# Patient Record
Sex: MALE | Race: WHITE | NOT HISPANIC OR LATINO | Employment: FULL TIME | URBAN - METROPOLITAN AREA
[De-identification: names, ages, dates, MRNs, and addresses within clinical notes are randomized per-mention and may not be internally consistent; named-entity substitution may affect disease eponyms.]

---

## 2017-01-03 ENCOUNTER — RADIANT APPOINTMENT (OUTPATIENT)
Dept: GENERAL RADIOLOGY | Facility: CLINIC | Age: 57
End: 2017-01-03
Attending: ORTHOPAEDIC SURGERY
Payer: COMMERCIAL

## 2017-01-03 ENCOUNTER — MYC MEDICAL ADVICE (OUTPATIENT)
Dept: FAMILY MEDICINE | Facility: CLINIC | Age: 57
End: 2017-01-03

## 2017-01-03 ENCOUNTER — OFFICE VISIT (OUTPATIENT)
Dept: ORTHOPEDICS | Facility: CLINIC | Age: 57
End: 2017-01-03
Payer: COMMERCIAL

## 2017-01-03 VITALS — BODY MASS INDEX: 35.44 KG/M2 | WEIGHT: 200 LBS | RESPIRATION RATE: 18 BRPM | HEIGHT: 63 IN

## 2017-01-03 DIAGNOSIS — M25.562 LEFT KNEE PAIN, UNSPECIFIED CHRONICITY: ICD-10-CM

## 2017-01-03 DIAGNOSIS — M25.562 LEFT KNEE PAIN, UNSPECIFIED CHRONICITY: Primary | ICD-10-CM

## 2017-01-03 DIAGNOSIS — M23.92 INTERNAL DERANGEMENT OF KNEE, LEFT: ICD-10-CM

## 2017-01-03 PROCEDURE — 99203 OFFICE O/P NEW LOW 30 MIN: CPT | Performed by: ORTHOPAEDIC SURGERY

## 2017-01-03 PROCEDURE — 73562 X-RAY EXAM OF KNEE 3: CPT | Mod: LT

## 2017-01-03 NOTE — PATIENT INSTRUCTIONS
Please call [facility] to schedule your MRI.  Refer to the number on the card given to you for the scheduling line.    -  Once your MRI is scheduled, make an appointment to discuss the results with Dr. Rishabh Logan.  You can call [phone] to make this appointment, anytime 2-3 days after your MRI scan is performed.  Dr. Rishabh Logan prefers his patients to come in for a follow-up to discuss next steps after MRI.

## 2017-01-03 NOTE — MR AVS SNAPSHOT
After Visit Summary   1/3/2017    Cesar Foss    MRN: [id number]           Patient Information     Date Of Birth          1960        Visit Information        Provider Department      1/3/2017 10:00 AM Rishabh Logan MD [facility]        Today's Diagnoses     Left knee pain, unspecified chronicity    -  1       Care Instructions     Please call [facility] to schedule your MRI.  Refer to the number on the card given to you for the scheduling line.    -  Once your MRI is scheduled, make an appointment to discuss the results with Dr. Rishabh Logan.  You can call [phone] to make this appointment, anytime 2-3 days after your MRI scan is performed.  Dr. Rishabh Logan prefers his patients to come in for a follow-up to discuss next steps after MRI.              Follow-ups after your visit        Who to contact     If you have questions or need follow up information about today's clinic visit or your schedule please contact [facility] directly at [phone].  Normal or non-critical lab and imaging results will be communicated to you by MyChart, letter or phone within 4 business days after the clinic has received the results. If you do not hear from us within 7 days, please contact the clinic through [facility] or phone. If you have a critical or abnormal lab result, we will notify you by phone as soon as possible.  Submit refill requests through AudioName or call your pharmacy and they will forward the refill request to us. Please allow 3 business days for your refill to be completed.          Additional Information About Your Visit        MyChart Information     AudioName gives you secure access to your electronic health record. If you see a primary care provider, you can also send messages to your care team and make appointments. If you have questions, please call your primary care clinic.  If you do not have a primary care  "provider, please call [phone] and they will assist you.        Care EveryWhere ID     This is your Care EveryWhere ID. This could be used by other organizations to access your [place] medical records  [id number]        Your Vitals Were     Respirations Height BMI (Body Mass Index)             18 1.6 m (5' 3\") 35.44 kg/m2          Blood Pressure from Last 3 Encounters:   10/18/16 136/86   10/03/16 135/89   03/08/16 126/81    Weight from Last 3 Encounters:   01/03/17 90.719 kg (200 lb)   10/18/16 92.534 kg (204 lb)   10/03/16 92.987 kg (205 lb)               Primary Care Provider Office Phone # Fax #    Martin Howe [phone]       [facility] [address] [facility] [address]        Thank you!     Thank you for choosing [facility]  for your care. Our goal is always to provide you with excellent care. Hearing back from our patients is one way we can continue to improve our services. Please take a few minutes to complete the written survey that you may receive in the mail after your visit with us. Thank you!             Your Updated Medication List - Protect others around you: Learn how to safely use, store and throw away your medicines at www.disposemymeds.org.          This list is accurate as of: 1/3/17 10:16 AM.  Always use your most recent med list.                   Brand Name Dispense Instructions for use    albuterol 108 (90 BASE) MCG/ACT Inhaler    albuterol    2 Inhaler    Inhale 2 puffs into the lungs every 6 hours as needed for shortness of breath / dyspnea or wheezing       amLODIPine 10 MG tablet    NORVASC    90 tablet    Take 1 tablet (10 mg) by mouth daily For blood pressure in PM Pharmacy ok to hold prescription until due       fluticasone 220 MCG/ACT Inhaler    FLOVENT HFA    1 Inhaler    Inhale 1 puff into the lungs 2 times daily for asthma prevention.       lisinopril 10 MG tablet    PRINIVIL/ZESTRIL    90 tablet    Take 1 tablet " (10 mg) by mouth daily In AM for blood pressure Pharmacy ok to hold prescription until due       loratadine 10 MG tablet    CLARITIN    90 tablet    Take 1 tablet (10 mg) by mouth daily As needed       omeprazole 20 MG tablet     30 tablet    Take 1 tablet (20 mg) by mouth daily Take 30-60 minutes before a meal.       simvastatin 10 MG tablet    ZOCOR    90 tablet    Take 1 tablet (10 mg) by mouth At Bedtime for cholesterol Pharmacy ok to hold prescription until due       SINUS RINSE KIT NA          sulindac 200 MG tablet    CLINORIL    60 tablet    Take 1 tablet (200 mg) by mouth 2 times daily (with meals) as needed for joint pain.

## 2017-01-03 NOTE — NURSING NOTE
"Chief Complaint   Patient presents with     RECHECK     New issue. Left knee twisted on 9/29/2016 stopped and turned on driveway. Pain level 5/10 sharp, shooting and constant. Rest and ice makes the pain better and kneeling and twisting makes the pain worse.       Initial Resp 18  Ht 1.6 m (5' 3\")  Wt 90.719 kg (200 lb)  BMI 35.44 kg/m2 Estimated body mass index is 35.44 kg/(m^2) as calculated from the following:    Height as of this encounter: 1.6 m (5' 3\").    Weight as of this encounter: 90.719 kg (200 lb).  BP completed using cuff size: NA (Not Taken)  Angy Crooks MA      "

## 2017-01-05 NOTE — PROGRESS NOTES
Cesar Foss is a 56 year old male who is seen in consultation at the request of Dr. Martin Howe  for left knee pain.    Past Medical History   Diagnosis Date     LBP (low back pain)      Migraines      Seizure disorder (H)      High cholesterol      Obesity      Unspecified asthma(493.90)      takes Flovent inhaler only PRN     Arthritis      Hypertension goal BP (blood pressure) < 140/90 7/30/2013     PONV (postoperative nausea and vomiting)      OREN (obstructive sleep apnea)      Bursitis      H/O/prepatellar     Diagnostic skin and sensitization tests (aka ALLERGENS) 1/27/15 skin tests pos. for:  cat/dog/DM/M/T/G/W      Seasonal allergic rhinitis      Seasonal allergic conjunctivitis      Allergic rhinitis due to animal dander      House dust mite allergy      Allergy to mold spores      1/27/15 skin tests pos. for:  cat/dog/DM/M/T/G/W        Past Surgical History   Procedure Laterality Date     C rad resec tonsil/pillars       Hc partial removal, clavicle  8/20/2004     Left shoulder [place]- Dr Doreen Nielson  2001     Colonoscopy  4/1/2013     Procedure: COLONOSCOPY;  COLONOSCOPY SCREEN;  Surgeon: Jameel Patel MD;  Location: [facility] OR     Esophagoscopy, gastroscopy, duodenoscopy (egd), combined N/A 8/21/2014     Procedure: COMBINED ESOPHAGOSCOPY, GASTROSCOPY, DUODENOSCOPY (EGD), BIOPSY SINGLE OR MULTIPLE;  Surgeon: Jameel Patel MD;  Location: [facility] OR     Laparoscopic cholecystectomy N/A 9/10/2014     Procedure: LAPAROSCOPIC CHOLECYSTECTOMY;  Surgeon: Jameel Patel MD;  Location: [facility] OR     Abdomen surgery       LAP BAND       Family History   Problem Relation Age of Onset     Eye Disorder Mother      HEART DISEASE Mother      60yo MI.     Lipids Mother      Macular Degeneration Mother      Lipids Father      CANCER Father      Alcohol/Drug Brother      Obesity Daughter      Glaucoma No family hx of      DIABETES Mother      Hypertension Mother      Hypertension Father       CEREBROVASCULAR DISEASE No family hx of      Thyroid Disease No family hx of      DIABETES Sister      hyperglycemic       Social History     Social History     Marital Status: Single     Spouse Name: N/A     Number of Children: N/A     Years of Education: N/A     Occupational History     Not on file.     Social History Main Topics     Smoking status: Never Smoker      Smokeless tobacco: Never Used      Comment: non smoking household     Alcohol Use: Yes      Comment: rare     Drug Use: No     Sexual Activity:     Partners: Female     Birth Control/ Protection: Condom     Other Topics Concern     Parent/Sibling W/ Cabg, Mi Or Angioplasty Before 65f 55m? Yes     MOM     Social History Narrative       Current Outpatient Prescriptions   Medication Sig Dispense Refill     lisinopril (PRINIVIL,ZESTRIL) 10 MG tablet Take 1 tablet (10 mg) by mouth daily In AM for blood pressure Pharmacy ok to hold prescription until due 90 tablet 1     simvastatin (ZOCOR) 10 MG tablet Take 1 tablet (10 mg) by mouth At Bedtime for cholesterol Pharmacy ok to hold prescription until due 90 tablet 3     amLODIPine (NORVASC) 10 MG tablet Take 1 tablet (10 mg) by mouth daily For blood pressure in PM Pharmacy ok to hold prescription until due 90 tablet 1     fluticasone (FLOVENT HFA) 220 MCG/ACT inhaler Inhale 1 puff into the lungs 2 times daily for asthma prevention. 1 Inhaler 5     sulindac (CLINORIL) 200 MG tablet Take 1 tablet (200 mg) by mouth 2 times daily (with meals) as needed for joint pain. 60 tablet 1     loratadine (CLARITIN) 10 MG tablet Take 1 tablet (10 mg) by mouth daily As needed 90 tablet 3     albuterol (ALBUTEROL) 108 (90 BASE) MCG/ACT inhaler Inhale 2 puffs into the lungs every 6 hours as needed for shortness of breath / dyspnea or wheezing 2 Inhaler 1     Hypertonic Nasal Wash (SINUS RINSE KIT NA)        omeprazole 20 MG tablet Take 1 tablet (20 mg) by mouth daily Take 30-60 minutes before a meal. 30 tablet 12       No Known  "Allergies    REVIEW OF SYSTEMS:  CONSTITUTIONAL:  NEGATIVE for fever, chills, change in weight, not feeling tired  SKIN:  NEGATIVE for worrisome rashes, no skin lumps, no skin ulcers and no non-healing wounds  EYES:  NEGATIVE for vision changes or irritation.  ENT/MOUTH:  NEGATIVE.  No hearing loss, no hoarseness, no difficulty swallowing.  RESP:  NEGATIVE. No cough or shortness of breath.  CV:  NEGATIVE for chest pain, palpitations or peripheral edema  GI:  NEGATIVE for nausea, abdominal pain, heartburn, or change in bowel habits  :  Negative. No dysuria, no hematuria  MUSCULOSKELETAL:  See HPI above  NEURO:  NEGATIVE . No headaches, no dizziness,  no numbness  ENDOCRINE:  NEGATIVE for temperature intolerance, skin/hair changes  HEME/ALLERGY/IMMUNE:  NEGATIVE for bleeding problems  PSYCHIATRIC:  NEGATIVE. no anxiety, no depression.      Exam:  Vitals: Resp 18  Ht 1.6 m (5' 3\")  Wt 90.719 kg (200 lb)  BMI 35.44 kg/m2  BMI= Body mass index is 35.44 kg/(m^2).  Constitutional:  healthy, alert and no distress  Neuro: Alert and Oriented x 3, Gait normal. Sensation grossly WNL.  HEENT:  Atraumatic, EOMI  Neck:  Neck supple with no tenderness.  Psych: Affect normal   Respiratory: Breathing not labored.  Cardiovascular: normal peripheral pulses  Lymph: no adenopathy  Skin: No rashes,worrisome lesions or skin problems  Spine: straight, no straight leg raising pain.  Hips show full range of motion.  There is no tenderness over the sacro-iliac joints, sciatic notch, or greater trochanters.     "

## 2017-01-06 ENCOUNTER — RADIANT APPOINTMENT (OUTPATIENT)
Dept: MRI IMAGING | Facility: CLINIC | Age: 57
End: 2017-01-06
Attending: ORTHOPAEDIC SURGERY
Payer: COMMERCIAL

## 2017-01-06 DIAGNOSIS — M23.92 INTERNAL DERANGEMENT OF KNEE, LEFT: ICD-10-CM

## 2017-01-06 DIAGNOSIS — M25.562 LEFT KNEE PAIN, UNSPECIFIED CHRONICITY: ICD-10-CM

## 2017-01-06 PROCEDURE — 73721 MRI JNT OF LWR EXTRE W/O DYE: CPT | Mod: LT | Performed by: RADIOLOGY

## 2017-01-06 NOTE — PROGRESS NOTES
HISTORY OF PRESENT ILLNESS:  Mr. Chikis Foss is a 56-year-old male referred from Dr. Martin Howe for evaluation of left knee pain.  He is a .  Pain started in 2016 when he stopped and turned in his driveway.  He has had some persistent pain since then, primarily over the inner aspect of the knee.  He has sharp, shooting, constant pain rated 5/10.  It is worse with kneeling or twisting.  He has had a knee sleeve which has helped a little bit.      IMAGING:  X-ray was performed today and images reviewed with the patient.  This shows no significant arthritis.  There is slight spurring of the patella.      PHYSICAL EXAMINATION:  Good range of motion of 0-125-130 degrees.  He has tenderness on the medial joint line in both mid and posterior.  He has no pain with varus or valgus stress.  There is no instability of MCL, LCL or cruciates.  He does have positive medial Kaylyn for diffuse pain medially.  He had some medial pain with lateral Kaylyn also.  There was a trace effusion in the left knee, negative on the right.  There is no increased warmth or erythema.  Sensation and circulation are intact.      IMPRESSION:  Left knee pain is suspicious for meniscus tear certainly seems to be internal derangement.  We discussed options and will proceed with MRI of the left knee to rule out medial meniscus tear and we will see him back following the MRI.         LIZA CARVER MD             D: 2017 10:01   T: 2017 10:23   MT: YULIET      Name:     CHIKIS FOSS   MRN:      [id number]        Account:      [id number]   :      1960           Visit Date:   2017      Document: [id number]

## 2017-01-17 ENCOUNTER — OFFICE VISIT (OUTPATIENT)
Dept: ORTHOPEDICS | Facility: CLINIC | Age: 57
End: 2017-01-17
Payer: COMMERCIAL

## 2017-01-17 VITALS — WEIGHT: 200 LBS | BODY MASS INDEX: 35.44 KG/M2 | HEIGHT: 63 IN | RESPIRATION RATE: 18 BRPM

## 2017-01-17 DIAGNOSIS — S83.242D OTHER TEAR OF MEDIAL MENISCUS OF LEFT KNEE AS CURRENT INJURY, SUBSEQUENT ENCOUNTER: Primary | ICD-10-CM

## 2017-01-17 PROCEDURE — 99212 OFFICE O/P EST SF 10 MIN: CPT | Performed by: ORTHOPAEDIC SURGERY

## 2017-01-17 NOTE — PROGRESS NOTES
Cesar ROSE Prudence returns for his left knee MRI results.  MRI images were independently visualized with the patient.  These showed a medial meniscus tear, the medial meniscus tear consists of posterior radial tear, moderate chondromalacia in the medial compartment and moderate chondromalacia in the patellofemoral compartment.  Symptoms have improved since he was last seen.    Impression:  Left Knee: medial meniscus tear  Chondromalacia.    Thus, our plan is activity modification avoiding squatting.  Possible arthroscopy in future.    Total time spent was 10 minutes; with 10 minutes spent face-to-face with patient discussing test results, treatment options, and estimated recovery time.

## 2017-01-17 NOTE — MR AVS SNAPSHOT
After Visit Summary   1/17/2017    Cesar Foss    MRN: [id number]           Patient Information     Date Of Birth          1960        Visit Information        Provider Department      1/17/2017 8:30 AM Rishabh Logan MD [facility]        Care Instructions    Please remember to call and schedule a follow up appointment if one was recommended at your earliest convenience.  Orthopedics CLINIC HOURS TELEPHONE NUMBER   Dr. Doreen Mcgill  Surgical Assistant      Angy  Medical Assistant   Mondays- 8:00 - 4:00  [facility]    Tuesdays- 8:00-4:00  [facility] in the morning and [facility] in the afternoon   Thursdays- 8:00-4:00  [facility] in the morning and [facility] in the afternoon  Specialty schedulers:   [phone] to make an appointment with any Specialty Provider.   Main Clinic:   [phone] to make an appointment with your primary provider   Urgent Care locations:    [facility]   Monday-Friday Closed  Saturday-Sunday 9 am-5pm    Monday-Friday 12 pm - 8 pm  Saturday-Sunday 9 am-5 pm   [phone]     If SURGERY has been recommended, please call our Specialty Schedulers at [phone] to schedule your procedure.    If you need a medication refill, please contact your pharmacy. Please allow 3 business days for your refill to be completed.  Use Aicent (secure e-mail communication and access to your chart) to send a message or to make an appointment. Please ask how you can sign up for [employer].            Follow-ups after your visit        Who to contact     If you have questions or need follow up information about today's clinic visit or your schedule please contact [facility] directly at [phone].  Normal or non-critical lab and imaging results will be communicated to you by MyChart, letter or phone within 4 business  "days after the clinic has received the results. If you do not hear from us within 7 days, please contact the clinic through [facility] or phone. If you have a critical or abnormal lab result, we will notify you by phone as soon as possible.  Submit refill requests through [facility] or call your pharmacy and they will forward the refill request to us. Please allow 3 business days for your refill to be completed.          Additional Information About Your Visit        [facility] Information     [facility] gives you secure access to your electronic health record. If you see a primary care provider, you can also send messages to your care team and make appointments. If you have questions, please call your primary care clinic.  If you do not have a primary care provider, please call [phone] and they will assist you.        Care EveryWhere ID     This is your Care EveryWhere ID. This could be used by other organizations to access your [place] medical records  [id number]        Your Vitals Were     Respirations Height BMI (Body Mass Index)             18 1.6 m (5' 3\") 35.44 kg/m2          Blood Pressure from Last 3 Encounters:   10/18/16 136/86   10/03/16 135/89   03/08/16 126/81    Weight from Last 3 Encounters:   01/17/17 90.719 kg (200 lb)   01/03/17 90.719 kg (200 lb)   10/18/16 92.534 kg (204 lb)              Today, you had the following     No orders found for display       Primary Care Provider Office Phone # Fax #    Martin Howe [phone]       [facility] [address] [facility] [address]        Thank you!     Thank you for choosing [facility]  for your care. Our goal is always to provide you with excellent care. Hearing back from our patients is one way we can continue to improve our services. Please take a few minutes to complete the written survey that you may receive in the mail after your visit with us. Thank you!             Your Updated Medication " List - Protect others around you: Learn how to safely use, store and throw away your medicines at www.disposemymeds.org.          This list is accurate as of: 1/17/17  8:47 AM.  Always use your most recent med list.                   Brand Name Dispense Instructions for use    albuterol 108 (90 BASE) MCG/ACT Inhaler    albuterol    2 Inhaler    Inhale 2 puffs into the lungs every 6 hours as needed for shortness of breath / dyspnea or wheezing       amLODIPine 10 MG tablet    NORVASC    90 tablet    Take 1 tablet (10 mg) by mouth daily For blood pressure in PM Pharmacy ok to hold prescription until due       fluticasone 220 MCG/ACT Inhaler    FLOVENT HFA    1 Inhaler    Inhale 1 puff into the lungs 2 times daily for asthma prevention.       lisinopril 10 MG tablet    PRINIVIL/ZESTRIL    90 tablet    Take 1 tablet (10 mg) by mouth daily In AM for blood pressure Pharmacy ok to hold prescription until due       loratadine 10 MG tablet    CLARITIN    90 tablet    Take 1 tablet (10 mg) by mouth daily As needed       omeprazole 20 MG tablet     30 tablet    Take 1 tablet (20 mg) by mouth daily Take 30-60 minutes before a meal.       simvastatin 10 MG tablet    ZOCOR    90 tablet    Take 1 tablet (10 mg) by mouth At Bedtime for cholesterol Pharmacy ok to hold prescription until due       SINUS RINSE KIT NA          sulindac 200 MG tablet    CLINORIL    60 tablet    Take 1 tablet (200 mg) by mouth 2 times daily (with meals) as needed for joint pain.

## 2017-01-17 NOTE — NURSING NOTE
"Chief Complaint   Patient presents with     RECHECK     Left knee/MRI results.       Initial Resp 18  Ht 1.6 m (5' 3\")  Wt 90.719 kg (200 lb)  BMI 35.44 kg/m2 Estimated body mass index is 35.44 kg/(m^2) as calculated from the following:    Height as of this encounter: 1.6 m (5' 3\").    Weight as of this encounter: 90.719 kg (200 lb).  BP completed using cuff size: NA (Not Taken)  Angy Crooks MA      "

## 2017-01-17 NOTE — PATIENT INSTRUCTIONS
Please remember to call and schedule a follow up appointment if one was recommended at your earliest convenience.  Orthopedics CLINIC HOURS TELEPHONE NUMBER   Dr. Doreen Mcgill  Surgical Assistant      Angy  Medical Assistant   Mondays- 8:00 - 4:00  [facility]    Tuesdays- 8:00-4:00  [facility] in the morning and [facility] in the afternoon   Thursdays- 8:00-4:00  [facility] in the morning and [facility] in the afternoon  Specialty schedulers:   [phone] to make an appointment with any Specialty Provider.   Main Clinic:   [phone] to make an appointment with your primary provider   Urgent Care locations:    [facility]   Monday-Friday Closed  Saturday-Sunday 9 am-5pm    Monday-Friday 12 pm - 8 pm  Saturday-Sunday 9 am-5 pm   [phone]     If SURGERY has been recommended, please call our Specialty Schedulers at [phone] to schedule your procedure.    If you need a medication refill, please contact your pharmacy. Please allow 3 business days for your refill to be completed.  Use [employer]ot (secure e-mail communication and access to your chart) to send a message or to make an appointment. Please ask how you can sign up for [facility].

## 2017-05-22 DIAGNOSIS — Z12.5 SPECIAL SCREENING FOR MALIGNANT NEOPLASM OF PROSTATE: ICD-10-CM

## 2017-05-22 DIAGNOSIS — I10 ESSENTIAL HYPERTENSION WITH GOAL BLOOD PRESSURE LESS THAN 140/90: ICD-10-CM

## 2017-05-22 DIAGNOSIS — K76.0 FATTY LIVER: ICD-10-CM

## 2017-05-22 LAB
ALBUMIN SERPL-MCNC: 4.5 G/DL (ref 3.4–5)
ALP SERPL-CCNC: 50 U/L (ref 40–150)
ALT SERPL W P-5'-P-CCNC: 110 U/L (ref 0–70)
ANION GAP SERPL CALCULATED.3IONS-SCNC: 10 MMOL/L (ref 3–14)
AST SERPL W P-5'-P-CCNC: 59 U/L (ref 0–45)
BILIRUB SERPL-MCNC: 0.6 MG/DL (ref 0.2–1.3)
BUN SERPL-MCNC: 13 MG/DL (ref 7–30)
CALCIUM SERPL-MCNC: 9.5 MG/DL (ref 8.5–10.1)
CHLORIDE SERPL-SCNC: 104 MMOL/L (ref 94–109)
CO2 SERPL-SCNC: 26 MMOL/L (ref 20–32)
CREAT SERPL-MCNC: 0.77 MG/DL (ref 0.66–1.25)
GFR SERPL CREATININE-BSD FRML MDRD: ABNORMAL ML/MIN/1.7M2
GLUCOSE SERPL-MCNC: 88 MG/DL (ref 70–99)
POTASSIUM SERPL-SCNC: 3.9 MMOL/L (ref 3.4–5.3)
PROT SERPL-MCNC: 8 G/DL (ref 6.8–8.8)
PSA SERPL-ACNC: 0.97 UG/L (ref 0–4)
SODIUM SERPL-SCNC: 140 MMOL/L (ref 133–144)

## 2017-05-22 PROCEDURE — G0103 PSA SCREENING: HCPCS | Performed by: FAMILY MEDICINE

## 2017-05-22 PROCEDURE — 36415 COLL VENOUS BLD VENIPUNCTURE: CPT | Performed by: FAMILY MEDICINE

## 2017-05-22 PROCEDURE — 80053 COMPREHEN METABOLIC PANEL: CPT | Performed by: FAMILY MEDICINE

## 2017-06-06 ENCOUNTER — OFFICE VISIT (OUTPATIENT)
Dept: FAMILY MEDICINE | Facility: CLINIC | Age: 57
End: 2017-06-06
Payer: COMMERCIAL

## 2017-06-06 VITALS
DIASTOLIC BLOOD PRESSURE: 78 MMHG | HEIGHT: 63 IN | WEIGHT: 201 LBS | HEART RATE: 98 BPM | BODY MASS INDEX: 35.61 KG/M2 | TEMPERATURE: 97.8 F | OXYGEN SATURATION: 98 % | SYSTOLIC BLOOD PRESSURE: 110 MMHG

## 2017-06-06 DIAGNOSIS — I10 ESSENTIAL HYPERTENSION WITH GOAL BLOOD PRESSURE LESS THAN 140/90: ICD-10-CM

## 2017-06-06 DIAGNOSIS — K76.0 FATTY LIVER: ICD-10-CM

## 2017-06-06 DIAGNOSIS — E78.5 HYPERLIPIDEMIA LDL GOAL <130: ICD-10-CM

## 2017-06-06 DIAGNOSIS — Z71.89 ADVANCED DIRECTIVES, COUNSELING/DISCUSSION: ICD-10-CM

## 2017-06-06 DIAGNOSIS — J45.30 MILD PERSISTENT ASTHMA WITHOUT COMPLICATION: ICD-10-CM

## 2017-06-06 PROCEDURE — 99214 OFFICE O/P EST MOD 30 MIN: CPT | Performed by: FAMILY MEDICINE

## 2017-06-06 RX ORDER — FLUTICASONE PROPIONATE 220 UG/1
1 AEROSOL, METERED RESPIRATORY (INHALATION) 2 TIMES DAILY
Qty: 1 INHALER | Refills: 5 | Status: SHIPPED | OUTPATIENT
Start: 2017-06-06 | End: 2017-12-01

## 2017-06-06 NOTE — NURSING NOTE
"Chief Complaint   Patient presents with     Hypertension     Lipids       Initial /78  Pulse 98  Temp 97.8  F (36.6  C) (Oral)  Ht 5' 3\" (1.6 m)  Wt 201 lb (91.2 kg)  SpO2 98%  BMI 35.61 kg/m2 Estimated body mass index is 35.61 kg/(m^2) as calculated from the following:    Height as of this encounter: 5' 3\" (1.6 m).    Weight as of this encounter: 201 lb (91.2 kg).  Medication Reconciliation: complete   Venus Topete CMA    "

## 2017-06-06 NOTE — PROGRESS NOTES
"SUBJECTIVE:  Cesar Foss, a 57 year old male scheduled an appointment to discuss the following issues:  Advanced directives, counseling/discussion  Follow-up htn, gerd, high cholesterol, fatty liver and asthma.  Was in [place] last week.   gerd stable. No chest pain or shortness of breath. Exercise - working up more. Asthma ok. Albuterol - not needed in past months. No nausea, vomiting or diarrhea or black/bloody stool. No urine changes or hematuria. No abdominal pain. Emotionally ok.   Outside blood pressure reading - ok.    Medical, social, surgical, and family histories reviewed.    ROS:  \  /78  Pulse 98  Temp 97.8  F (36.6  C) (Oral)  Ht 5' 3\" (1.6 m)  Wt 201 lb (91.2 kg)  SpO2 98%  BMI 35.61 kg/m2  EXAM:  GENERAL APPEARANCE: healthy, alert and no distress  EYES: EOMI,  PERRL  NECK: no adenopathy, no asymmetry, masses, or scars and thyroid normal to palpation  RESP: lungs clear to auscultation - no rales, rhonchi or wheezes  CV: regular rates and rhythm, normal S1 S2, no S3 or S4 and no murmur, click or rub -  ABDOMEN:  soft, nontender, no HSM or masses and bowel sounds normal  MS: extremities normal- no gross deformities noted, no evidence of inflammation in joints, FROM in all extremities.  NEURO: Normal strength and tone, sensory exam grossly normal, mentation intact and speech normal  PSYCH: mentation appears normal and affect normal/bright    ASSESSMENT / PLAN:  (E66.01) Obesity, Class II, BMI 35-39.9, with comorbidity (H)  (primary encounter diagnosis)  Comment: needs help  Plan: more exercise/diet. Recheck in 6 months      (J45.30) Mild persistent asthma without complication  Comment: stable  Plan: fluticasone (FLOVENT HFA) 220 MCG/ACT Inhaler        Rare albuteroll prn    (K76.0) Fatty liver  Comment: worse but back form [place] and no symptoms   Plan: Expected course and warning signs reviewed. Repeat u/s if symptoms or worse. Exercise/ weight loss. Avoid ALCOHOL.     (I10) Essential " hypertension with goal blood pressure less than 140/90  Comment: stable  Plan: continue meds and self-monitor. Chest pain or shortness of breath to er.     (E78.5) Hyperlipidemia LDL goal <130  Comment: stable  Plan: Recheck in 6 months    Martin Howe MD

## 2017-06-06 NOTE — MR AVS SNAPSHOT
After Visit Summary   6/6/2017    Cesar Foss    MRN: [id number]           Patient Information     Date Of Birth          1960        Visit Information        Provider Department      6/6/2017 5:10 PM Martin Howe MD [facility]        Today's Diagnoses     Obesity, Class II, BMI 35-39.9, with comorbidity (H)    -  1    Mild persistent asthma without complication        Fatty liver        Essential hypertension with goal blood pressure less than 140/90        Hyperlipidemia LDL goal <130        Advanced directives, counseling/discussion           Follow-ups after your visit        Who to contact     If you have questions or need follow up information about today's clinic visit or your schedule please contact [facility] directly at [phone].  Normal or non-critical lab and imaging results will be communicated to you by Wellhart, letter or phone within 4 business days after the clinic has received the results. If you do not hear from us within 7 days, please contact the clinic through Wellhart or phone. If you have a critical or abnormal lab result, we will notify you by phone as soon as possible.  Submit refill requests through Bromium or call your pharmacy and they will forward the refill request to us. Please allow 3 business days for your refill to be completed.          Additional Information About Your Visit        MyChart Information     Bromium gives you secure access to your electronic health record. If you see a primary care provider, you can also send messages to your care team and make appointments. If you have questions, please call your primary care clinic.  If you do not have a primary care provider, please call [phone] and they will assist you.        Care EveryWhere ID     This is your Care EveryWhere ID. This could be used by other organizations to access your [place] medical records  [id number]        Your Vitals Were     Pulse  "Temperature Height Pulse Oximetry BMI (Body Mass Index)       98 97.8  F (36.6  C) (Oral) 5' 3\" (1.6 m) 98% 35.61 kg/m2        Blood Pressure from Last 3 Encounters:   06/06/17 110/78   10/18/16 136/86   10/03/16 135/89    Weight from Last 3 Encounters:   06/06/17 201 lb (91.2 kg)   01/17/17 200 lb (90.7 kg)   01/03/17 200 lb (90.7 kg)              Today, you had the following     No orders found for display         Where to get your medicines      These medications were sent to [facility]/pharmacy #[id number] - [place], [place] - [address],  [address] [facility]  [address], [facility] [address]     Phone:  [phone]     fluticasone 220 MCG/ACT Inhaler          Primary Care Provider Office Phone # Fax #    Martin Howe [phone]       [facility] [address] [address] [facility] [address]        Thank you!     Thank you for choosing [facility]  for your care. Our goal is always to provide you with excellent care. Hearing back from our patients is one way we can continue to improve our services. Please take a few minutes to complete the written survey that you may receive in the mail after your visit with us. Thank you!             Your Updated Medication List - Protect others around you: Learn how to safely use, store and throw away your medicines at www.disposemymeds.org.          This list is accurate as of: 6/6/17  5:46 PM.  Always use your most recent med list.                   Brand Name Dispense Instructions for use    albuterol 108 (90 BASE) MCG/ACT Inhaler    albuterol    2 Inhaler    Inhale 2 puffs into the lungs every 6 hours as needed for shortness of breath / dyspnea or wheezing       amLODIPine 10 MG tablet    NORVASC    90 tablet    TAKE 1 TABLET BY MOUTH FOR BLOOD PRESSURE IN THE PM       fluticasone 220 MCG/ACT Inhaler    FLOVENT HFA    1 Inhaler    Inhale 1 puff into the lungs 2 times daily for asthma prevention.       " lisinopril 10 MG tablet    PRINIVIL/ZESTRIL    90 tablet    TAKE 1 TABLET BY MOUTH IN THE MORNING       simvastatin 10 MG tablet    ZOCOR    90 tablet    Take 1 tablet (10 mg) by mouth At Bedtime for cholesterol Pharmacy ok to hold prescription until due       SINUS RINSE KIT NA

## 2017-06-07 ASSESSMENT — ASTHMA QUESTIONNAIRES: ACT_TOTALSCORE: 24

## 2017-06-12 ENCOUNTER — TELEPHONE (OUTPATIENT)
Dept: FAMILY MEDICINE | Facility: CLINIC | Age: 57
End: 2017-06-12

## 2017-06-12 DIAGNOSIS — J45.30 MILD PERSISTENT ASTHMA WITHOUT COMPLICATION: Primary | ICD-10-CM

## 2017-06-12 NOTE — TELEPHONE ENCOUNTER
Please change product to Pulmicort Flexhaler     Will cover this but not Flovent HFA    Questions call help desk  [phone]    Card mendez ID [id number]    Third party name [facility]

## 2017-06-13 ENCOUNTER — OFFICE VISIT (OUTPATIENT)
Dept: ORTHOPEDICS | Facility: CLINIC | Age: 57
End: 2017-06-13
Payer: COMMERCIAL

## 2017-06-13 VITALS — HEIGHT: 63 IN | BODY MASS INDEX: 36.14 KG/M2 | RESPIRATION RATE: 18 BRPM | TEMPERATURE: 98.1 F | WEIGHT: 204 LBS

## 2017-06-13 DIAGNOSIS — M94.262 CHONDROMALACIA, KNEE, LEFT: ICD-10-CM

## 2017-06-13 DIAGNOSIS — S83.242D OTHER TEAR OF MEDIAL MENISCUS OF LEFT KNEE AS CURRENT INJURY, SUBSEQUENT ENCOUNTER: Primary | ICD-10-CM

## 2017-06-13 PROBLEM — J45.30 MILD PERSISTENT ASTHMA WITHOUT COMPLICATION: Status: ACTIVE | Noted: 2017-06-13

## 2017-06-13 PROCEDURE — 99213 OFFICE O/P EST LOW 20 MIN: CPT | Performed by: ORTHOPAEDIC SURGERY

## 2017-06-13 NOTE — LETTER
6/13/2017       RE: Cesar Foss  [address] [address] [address]           Dear Colleague,    Thank you for referring your patient, Cesar Foss, to the [facility]. Please see a copy of my visit note below.    Cesar Foss returns for his left knee pain.  MRI previously showed a medial meniscus tear, the medial meniscus tear consists of posterior radial tear, moderate chondromalacia in the medial compartment and moderate chondromalacia in the patellofemoral compartment.  Symptoms have increased.  Strengthening and ibuprofen did not help.    Impression:  Left Knee: medial meniscus tear  Chondromalacia.    Thus, our plan is Knee Arthroscopy OA: Discussed findings with patient.  We talked about treatment options.  The pain may be due to either arthritis or the meniscal tear; or a combination of both.  I feel the best treatment option due to the mechanical symptoms is to do arthroscopy.  Injections can be done later after the knee has been debrided.  I have explained the nature of the procedure, the risks and recovery time with the patient.            Again, thank you for allowing me to participate in the care of your patient.        Sincerely,              Rishabh Logan MD

## 2017-06-13 NOTE — PATIENT INSTRUCTIONS
Preop physical with primary physician is needed within 30 days of the surgery.  Nothing to eat or drink for 8 hours before surgery.  For same day surgery you need a ride.  Someone should stay with you for 12 hours afterward.  Stop blood thinners 5 days before surgery (aspirin, warfarin, anti-inflammatories).  Stop Plavix at least 10-14 days before surgery.    After arthroscopy I will give you these instructions:  You may walk on the leg as soon as you wish.  Bend the knee as soon as you wish.   Most people use crutches for a couple days.  Leave initial dressing for 2 days.  Then you may remove dressing and shower.  Rewrap with ace wrap.  Leave steri-strips in place.  Weight bearing as tolerated.  Apply ice or cold packs intermittently as needed to relieve pain.   Take pain medications as much as needed, but as little as you can get away with.  They do cause constipation.  May use Aleve up to 4 tablets per day or ibuprofen up to 12 tablets per day if desired..  Most people take about a week off work.  I recommend taking one aspirin 325 mg / day for 3 weeks to minimize chance of a blood clot.  Return to clinic 1 week after surgery.    Call [phone] to schedule your surgery.

## 2017-06-13 NOTE — PROGRESS NOTES
Cesar A Fjerstad returns for his left knee pain.  MRI previously showed a medial meniscus tear, the medial meniscus tear consists of posterior radial tear, moderate chondromalacia in the medial compartment and moderate chondromalacia in the patellofemoral compartment.  Symptoms have increased.  Strengthening and ibuprofen did not help.    Impression:  Left Knee: medial meniscus tear  Chondromalacia.    Thus, our plan is Knee Arthroscopy OA: Discussed findings with patient.  We talked about treatment options.  The pain may be due to either arthritis or the meniscal tear; or a combination of both.  I feel the best treatment option due to the mechanical symptoms is to do arthroscopy.  Injections can be done later after the knee has been debrided.  I have explained the nature of the procedure, the risks and recovery time with the patient.

## 2017-06-13 NOTE — MR AVS SNAPSHOT
After Visit Summary   6/13/2017    Cesar Foss    MRN: [id number]           Patient Information     Date Of Birth          1960        Visit Information        Provider Department      6/13/2017 9:00 AM Rishabh Logan MD [facility]        Care Instructions    Preop physical with primary physician is needed within 30 days of the surgery.  Nothing to eat or drink for 8 hours before surgery.  For same day surgery you need a ride.  Someone should stay with you for 12 hours afterward.  Stop blood thinners 5 days before surgery (aspirin, warfarin, anti-inflammatories).  Stop Plavix at least 10-14 days before surgery.    After arthroscopy I will give you these instructions:  You may walk on the leg as soon as you wish.  Bend the knee as soon as you wish.   Most people use crutches for a couple days.  Leave initial dressing for 2 days.  Then you may remove dressing and shower.  Rewrap with ace wrap.  Leave steri-strips in place.  Weight bearing as tolerated.  Apply ice or cold packs intermittently as needed to relieve pain.   Take pain medications as much as needed, but as little as you can get away with.  They do cause constipation.  May use Aleve up to 4 tablets per day or ibuprofen up to 12 tablets per day if desired..  Most people take about a week off work.  I recommend taking one aspirin 325 mg / day for 3 weeks to minimize chance of a blood clot.  Return to clinic 1 week after surgery.    Call [phone] to schedule your surgery.            Follow-ups after your visit        Your next 10 appointments already scheduled     Jun 13, 2017  9:00 AM CDT   Return Visit with Rishabh Logan MD   [facility] ([facility]    [address] [place] [id number] [phone]              Who to contact     If you have questions or need follow up information about today's clinic visit or your schedule please contact  "[facility] directly at [phone].  Normal or non-critical lab and imaging results will be communicated to you by MyChart, letter or phone within 4 business days after the clinic has received the results. If you do not hear from us within 7 days, please contact the clinic through Shiram Credithart or phone. If you have a critical or abnormal lab result, we will notify you by phone as soon as possible.  Submit refill requests through Actionality or call your pharmacy and they will forward the refill request to us. Please allow 3 business days for your refill to be completed.          Additional Information About Your Visit        Shiram CreditharGoodmail Systems Information     Actionality gives you secure access to your electronic health record. If you see a primary care provider, you can also send messages to your care team and make appointments. If you have questions, please call your primary care clinic.  If you do not have a primary care provider, please call [phone] and they will assist you.        Care EveryWhere ID     This is your Care EveryWhere ID. This could be used by other organizations to access your [place] medical records  [id number]        Your Vitals Were     Temperature Respirations Height BMI (Body Mass Index)          98.1  F (36.7  C) 18 1.6 m (5' 3\") 36.14 kg/m2         Blood Pressure from Last 3 Encounters:   06/06/17 110/78   10/18/16 136/86   10/03/16 135/89    Weight from Last 3 Encounters:   06/13/17 92.5 kg (204 lb)   06/06/17 91.2 kg (201 lb)   01/17/17 90.7 kg (200 lb)              Today, you had the following     No orders found for display       Primary Care Provider Office Phone # Fax #    Martin Howe [phone]       [facility] [address] [facility] [address]        Thank you!     Thank you for choosing [facility]  for your care. Our goal is always to provide you with excellent care. Hearing back from our patients is one way we can " continue to improve our services. Please take a few minutes to complete the written survey that you may receive in the mail after your visit with us. Thank you!             Your Updated Medication List - Protect others around you: Learn how to safely use, store and throw away your medicines at www.disposemymeds.org.          This list is accurate as of: 6/13/17  8:57 AM.  Always use your most recent med list.                   Brand Name Dispense Instructions for use    albuterol 108 (90 BASE) MCG/ACT Inhaler    albuterol    2 Inhaler    Inhale 2 puffs into the lungs every 6 hours as needed for shortness of breath / dyspnea or wheezing       amLODIPine 10 MG tablet    NORVASC    90 tablet    TAKE 1 TABLET BY MOUTH FOR BLOOD PRESSURE IN THE PM       budesonide 180 MCG/ACT inhaler    PULMICORT FLEXHALER    3 Inhaler    Inhale 1 puff into the lungs 2 times daily Replaces flovent for insurance       fluticasone 220 MCG/ACT Inhaler    FLOVENT HFA    1 Inhaler    Inhale 1 puff into the lungs 2 times daily for asthma prevention.       lisinopril 10 MG tablet    PRINIVIL/ZESTRIL    90 tablet    TAKE 1 TABLET BY MOUTH IN THE MORNING       simvastatin 10 MG tablet    ZOCOR    90 tablet    Take 1 tablet (10 mg) by mouth At Bedtime for cholesterol Pharmacy ok to hold prescription until due       SINUS RINSE KIT NA

## 2017-06-13 NOTE — NURSING NOTE
"Chief Complaint   Patient presents with     RECHECK     Left knee MMT.       Initial Temp 98.1  F (36.7  C)  Resp 18  Ht 1.6 m (5' 3\")  Wt 92.5 kg (204 lb)  BMI 36.14 kg/m2 Estimated body mass index is 36.14 kg/(m^2) as calculated from the following:    Height as of this encounter: 1.6 m (5' 3\").    Weight as of this encounter: 92.5 kg (204 lb).  Medication Reconciliation: complete   Angy Crooks MA      "

## 2017-07-10 NOTE — PROGRESS NOTES
[facility]  [address] [address] [address] [phone]  Dept: [phone]    PRE-OP EVALUATION:  Today's date: 2017    Cesar Foss (: 1960) presents for pre-operative evaluation assessment as requested by Dr. Logan.  He requires evaluation and anesthesia risk assessment prior to undergoing surgery/procedure for treatment of left knee .  Proposed procedure: repair    Date of Surgery/ Procedure: 2017  Time of Surgery/ Procedure: 7:30 am  Hospital/Surgical Facility: [place]    Primary Physician: Martin Howe  Type of Anesthesia Anticipated: to be determined    Patient has a Health Care Directive or Living Will:  NO    Preop Questions 2017   1.  Do you have a history of heart attack, stroke, stent, bypass or surgery on an artery in the head, neck, heart or legs? No   2.  Do you ever have any pain or discomfort in your chest? No   3.  Do you have a history of  Heart Failure? No   4.   Are you troubled by shortness of breath when:  walking on a level surface, or up a slight hill, or at night? YES - asthma - stable recently.   5.  Do you currently have a cold, bronchitis or other respiratory infection? No   6.  Do you have a cough, shortness of breath, or wheezing? No   7.  Do you sometimes get pains in the calves of your legs when you walk? No   8. Do you or anyone in your family have previous history of blood clots? No   9.  Do you or does anyone in your family have a serious bleeding problem such as prolonged bleeding following surgeries or cuts? No   10. Have you ever had problems with anemia or been told to take iron pills? No   11. Have you had any abnormal blood loss such as black, tarry or bloody stools? No   12. Have you ever had a blood transfusion? No   13. Have you or any of your relatives ever had problems with anesthesia? YES - nausea   14. Do you have sleep apnea, excessive snoring or daytime drowsiness? YES - c pap   15. Do you have any  prosthetic heart valves? No   16. Do you have prosthetic joints? No         HPI:                                                      Brief HPI related to upcoming procedure: meniscal tear left knee      HYPERTENSION - Patient has longstanding history of mod-severe HTN , currently denies any symptoms referable to elevated blood pressure. Specifically denies chest pain, palpitations, dyspnea, orthopnea, PND or peripheral edema. Blood pressure readings have been in normal range. Current medication regimen is as listed below. Patient denies any side effects of medication.                                                                                                                                                                                          .  HYPERLIPIDEMIA - Patient has a long history of significant Hyperlipidemia requiring medication for treatment with recent good control. Patient reports no problems or side effects with the medication.                                                                                                                                                       .  ASTHMA - stalbe                   .  SLEEP PROBLEM-  cpap                                                                                                                                         .    MEDICAL HISTORY:                                                      Patient Active Problem List    Diagnosis Date Noted     Mild persistent asthma without complication 06/13/2017     Priority: Medium     Chondromalacia, knee, left 06/13/2017     Priority: Medium     Advanced directives, counseling/discussion 06/06/2017     Priority: Medium     i gave handouts       Hyperlipidemia LDL goal <130 06/06/2017     Priority: Medium     Other tear of medial meniscus of left knee as current injury, subsequent encounter 01/17/2017     Priority: Medium     Obesity, Class II, BMI 35-39.9, with comorbidity 10/03/2016     Priority:  Medium     Gastroesophageal reflux disease without esophagitis 03/08/2016     Priority: Medium     Fatty liver 03/08/2016     Priority: Medium     Allergy to mold spores      Priority: Medium     1/27/15 skin tests pos. for:  cat/dog/DM/M/T/G/W        House dust mite allergy      Priority: Medium     Allergic rhinitis due to animal dander      Priority: Medium     Seasonal allergic conjunctivitis      Priority: Medium     Seasonal allergic rhinitis      Priority: Medium     Diagnostic skin and sensitization tests (aka ALLERGENS)      Priority: Medium     OREN (obstructive sleep apnea) 04/09/2014     Priority: Medium     Shoulder impingement - left 08/09/2013     Priority: Medium     Essential hypertension with goal blood pressure less than 140/90 07/30/2013     Priority: Medium     Mild persistent asthma 02/26/2013     Priority: Medium     CARDIOVASCULAR SCREENING; LDL GOAL LESS THAN 160 10/31/2010     Priority: Medium     Migraine, ophthalmoplegic 08/23/2010     Priority: Medium     BURSITIS, PREPATELLAR - right 08/12/2010     Priority: Medium      Past Medical History:   Diagnosis Date     Allergic rhinitis due to animal dander      Allergy to mold spores     1/27/15 skin tests pos. for:  cat/dog/DM/M/T/G/W      Arthritis      Bursitis     H/O/prepatellar     Diagnostic skin and sensitization tests (aka ALLERGENS) 1/27/15 skin tests pos. for:  cat/dog/DM/M/T/G/W      High cholesterol      House dust mite allergy      Hypertension goal BP (blood pressure) < 140/90 7/30/2013     LBP (low back pain)      Migraines      Obesity      OREN (obstructive sleep apnea)      PONV (postoperative nausea and vomiting)      Seasonal allergic conjunctivitis      Seasonal allergic rhinitis      Seizure disorder (H)      Unspecified asthma(493.90)     takes Flovent inhaler only PRN     Past Surgical History:   Procedure Laterality Date     ABDOMEN SURGERY      LAP BAND     C RAD RESEC TONSIL/PILLARS       COLONOSCOPY  4/1/2013     Procedure: COLONOSCOPY;  COLONOSCOPY SCREEN;  Surgeon: Jameel Patel MD;  Location: [facility] OR     ESOPHAGOSCOPY, GASTROSCOPY, DUODENOSCOPY (EGD), COMBINED N/A 8/21/2014    Procedure: COMBINED ESOPHAGOSCOPY, GASTROSCOPY, DUODENOSCOPY (EGD), BIOPSY SINGLE OR MULTIPLE;  Surgeon: Jameel Patel MD;  Location: [facility] OR     HC PARTIAL REMOVAL, CLAVICLE  8/20/2004    Left shoulder Jose [place] Dr Logan     LAPAROSCOPIC CHOLECYSTECTOMY N/A 9/10/2014    Procedure: LAPAROSCOPIC CHOLECYSTECTOMY;  Surgeon: Jameel Patel MD;  Location: [facility] OR     LASIK  2001     Current Outpatient Prescriptions   Medication Sig Dispense Refill     budesonide (PULMICORT FLEXHALER) 180 MCG/ACT inhaler Inhale 1 puff into the lungs 2 times daily Replaces flovent for insurance 3 Inhaler 3     fluticasone (FLOVENT HFA) 220 MCG/ACT Inhaler Inhale 1 puff into the lungs 2 times daily for asthma prevention. 1 Inhaler 5     lisinopril (PRINIVIL/ZESTRIL) 10 MG tablet TAKE 1 TABLET BY MOUTH IN THE MORNING 90 tablet 0     amLODIPine (NORVASC) 10 MG tablet TAKE 1 TABLET BY MOUTH FOR BLOOD PRESSURE IN THE PM 90 tablet 0     simvastatin (ZOCOR) 10 MG tablet Take 1 tablet (10 mg) by mouth At Bedtime for cholesterol Pharmacy ok to hold prescription until due 90 tablet 3     albuterol (ALBUTEROL) 108 (90 BASE) MCG/ACT inhaler Inhale 2 puffs into the lungs every 6 hours as needed for shortness of breath / dyspnea or wheezing 2 Inhaler 1     Hypertonic Nasal Wash (SINUS RINSE KIT NA)        OTC products: none, rare ibuprofen    No Known Allergies   Latex Allergy: NO    Social History   Substance Use Topics     Smoking status: Never Smoker     Smokeless tobacco: Never Used      Comment: non smoking household     Alcohol use Yes      Comment: rare     History   Drug Use No       REVIEW OF SYSTEMS:                                                    C: NEGATIVE for fever, chills, change in weight  INTEGUMENTARY/SKIN: NEGATIVE for worrisome  "rashes, moles or lesions  E/M: NEGATIVE for ear, mouth and throat problems  R: NEGATIVE for significant cough or SOB  CV: NEGATIVE for chest pain, palpitations or peripheral edema  GI: NEGATIVE for nausea, abdominal pain, heartburn, or change in bowel habits, no black or bloody stools.   : negative for dysuria, hematuria, decreased urinary stream, erectile dysfunction  MUSCULOSKELETAL: NEGATIVE for significant arthralgias or myalgia  NEURO: NEGATIVE for weakness, dizziness or paresthesias  ENDOCRINE: NEGATIVE for temperature intolerance, skin/hair changes  HEME/ALLERGY/IMMUNE: NEGATIVE for bleeding problems, lower platelets.   PSYCHIATRIC: NEGATIVE for changes in mood or affect    EXAM:                                                    There were no vitals taken for this visit.   /76  Pulse 96  Temp 98.5  F (36.9  C) (Oral)  Ht 5' 3\" (1.6 m)  Wt 201 lb (91.2 kg)  SpO2 96%  BMI 35.61 kg/m2    GENERAL APPEARANCE: healthy, alert and no distress  EYES: Eyes grossly normal to inspection, PERRL and conjunctivae and sclerae normal  HENT: ear canals and TM's normal and nose and mouth without ulcers or lesions  NECK: no adenopathy, no asymmetry, masses, or scars and thyroid normal to palpation  RESP: lungs clear to auscultation - no rales, rhonchi or wheezes  CV: regular rate and rhythm, normal S1 S2, no S3 or S4 and no murmur, click or rub   ABDOMEN: soft, nontender, no HSM or masses and bowel sounds normal  MS: extremities normal- no gross deformities noted  NEURO: Normal strength and tone, sensory exam grossly normal, mentation intact and speech normal  PSYCH: mentation appears normal and affect normal/bright    DIAGNOSTICS:                                                      EKG: appears normal, NSR, normal axis, normal intervals, no acute ST/T changes c/w ischemia, no LVH by voltage criteria, unchanged from previous tracings  Labs Resulted Today:   Results for orders placed or performed in visit on " 07/11/17   CBC with platelets   Result Value Ref Range    WBC 5.7 4.0 - 11.0 10e9/L    RBC Count 4.60 4.4 - 5.9 10e12/L    Hemoglobin 15.3 13.3 - 17.7 g/dL    Hematocrit 43.3 40.0 - 53.0 %    MCV 94 78 - 100 fl    MCH 33.3 (H) 26.5 - 33.0 pg    MCHC 35.3 31.5 - 36.5 g/dL    RDW 12.1 10.0 - 15.0 %    Platelet Count 116 (L) 150 - 450 10e9/L     Labs Drawn and in Process:   Unresulted Labs Ordered in the Past 30 Days of this Admission     Date and Time Order Name Status Description    7/11/2017 1550 POTASSIUM In process           Recent Labs   Lab Test  05/22/17   0653  09/27/16   0711   09/04/14   1336   HGB   --   15.5   --   16.0   PLT   --   109*   --   109*   NA  140  138   < >  139   POTASSIUM  3.9  3.8   < >  3.5   CR  0.77  0.86   < >  0.93    < > = values in this interval not displayed.        IMPRESSION:                                                    Reason for surgery/procedure: knee pain/meniscal tear// arthroscopy repair  Diagnosis/reason for consult: htn/high cholesterol/fatty liver/thrombocytopenia/asthma// fitness for surgery  Ok for low risk procedure. Denies chest pain and asthma stable. ekg and labs ok.   Low platelets - stable and no active bleeding. No asa and limit ALCOHOL/avoid nsaids. Expected course and warning signs reviewed. Recheck in 6 months  Sooner if new issues/concerns. Call/email with questions/concerns    The proposed surgical procedure is considered LOW risk.    REVISED CARDIAC RISK INDEX  The patient has the following serious cardiovascular risks for perioperative complications such as (MI, PE, VFib and 3  AV Block):  No serious cardiac risks  INTERPRETATION: 1 risks: Class II (low risk - 0.9% complication rate)    The patient has the following additional risks for perioperative complications:  No identified additional risks    RECOMMENDATIONS:                                                      --Consult hospital rounder / IM to assist post-op medical management    --Patient  is to take all scheduled medications on the day of surgery EXCEPT for modifications listed below - no asa/nsaids or ALCOHOL.     APPROVAL GIVEN to proceed with proposed procedure, without further diagnostic evaluation         Signed Electronically by: Martin Howe MD    Copy of this evaluation report is provided to requesting physician.    [place] Preop Guidelines

## 2017-07-11 ENCOUNTER — OFFICE VISIT (OUTPATIENT)
Dept: FAMILY MEDICINE | Facility: CLINIC | Age: 57
End: 2017-07-11
Payer: COMMERCIAL

## 2017-07-11 VITALS
HEART RATE: 96 BPM | WEIGHT: 201 LBS | TEMPERATURE: 98.5 F | OXYGEN SATURATION: 96 % | DIASTOLIC BLOOD PRESSURE: 76 MMHG | BODY MASS INDEX: 35.61 KG/M2 | HEIGHT: 63 IN | SYSTOLIC BLOOD PRESSURE: 100 MMHG

## 2017-07-11 DIAGNOSIS — D69.6 THROMBOCYTOPENIA (H): ICD-10-CM

## 2017-07-11 DIAGNOSIS — I10 ESSENTIAL HYPERTENSION WITH GOAL BLOOD PRESSURE LESS THAN 140/90: ICD-10-CM

## 2017-07-11 DIAGNOSIS — Z01.818 PREOP GENERAL PHYSICAL EXAM: Primary | ICD-10-CM

## 2017-07-11 DIAGNOSIS — S83.242D OTHER TEAR OF MEDIAL MENISCUS OF LEFT KNEE AS CURRENT INJURY, SUBSEQUENT ENCOUNTER: ICD-10-CM

## 2017-07-11 DIAGNOSIS — J45.30 MILD PERSISTENT ASTHMA WITHOUT COMPLICATION: ICD-10-CM

## 2017-07-11 LAB
ERYTHROCYTE [DISTWIDTH] IN BLOOD BY AUTOMATED COUNT: 12.1 % (ref 10–15)
HCT VFR BLD AUTO: 43.3 % (ref 40–53)
HGB BLD-MCNC: 15.3 G/DL (ref 13.3–17.7)
MCH RBC QN AUTO: 33.3 PG (ref 26.5–33)
MCHC RBC AUTO-ENTMCNC: 35.3 G/DL (ref 31.5–36.5)
MCV RBC AUTO: 94 FL (ref 78–100)
PLATELET # BLD AUTO: 116 10E9/L (ref 150–450)
POTASSIUM SERPL-SCNC: 3.7 MMOL/L (ref 3.4–5.3)
RBC # BLD AUTO: 4.6 10E12/L (ref 4.4–5.9)
WBC # BLD AUTO: 5.7 10E9/L (ref 4–11)

## 2017-07-11 PROCEDURE — 36415 COLL VENOUS BLD VENIPUNCTURE: CPT | Performed by: FAMILY MEDICINE

## 2017-07-11 PROCEDURE — 84132 ASSAY OF SERUM POTASSIUM: CPT | Performed by: FAMILY MEDICINE

## 2017-07-11 PROCEDURE — 93000 ELECTROCARDIOGRAM COMPLETE: CPT | Performed by: FAMILY MEDICINE

## 2017-07-11 PROCEDURE — 99215 OFFICE O/P EST HI 40 MIN: CPT | Performed by: FAMILY MEDICINE

## 2017-07-11 PROCEDURE — 85027 COMPLETE CBC AUTOMATED: CPT | Performed by: FAMILY MEDICINE

## 2017-07-11 NOTE — NURSING NOTE
"Chief Complaint   Patient presents with     Pre-Op Exam       Initial /76  Pulse 96  Temp 98.5  F (36.9  C) (Oral)  Ht 5' 3\" (1.6 m)  Wt 201 lb (91.2 kg)  SpO2 96%  BMI 35.61 kg/m2 Estimated body mass index is 35.61 kg/(m^2) as calculated from the following:    Height as of this encounter: 5' 3\" (1.6 m).    Weight as of this encounter: 201 lb (91.2 kg).  Medication Reconciliation: complete   Venus Topete CMA    "

## 2017-07-11 NOTE — MR AVS SNAPSHOT
After Visit Summary   7/11/2017    Cesar Foss    MRN: [id number]           Patient Information     Date Of Birth          1960        Visit Information        Provider Department      7/11/2017 3:10 PM Martin Howe MD [facility]        Today's Diagnoses     Preop general physical exam    -  1    Other tear of medial meniscus of left knee as current injury, subsequent encounter        Thrombocytopenia (H)        Mild persistent asthma without complication        Essential hypertension with goal blood pressure less than 140/90          Care Instructions      Before Your Surgery      Call your surgeon if there is any change in your health. This includes signs of a cold or flu (such as a sore throat, runny nose, cough, rash or fever).    Do not smoke, drink alcohol or take over the counter medicine (unless your surgeon or primary care doctor tells you to) for the 24 hours before and after surgery.    If you take prescribed drugs: Follow your doctor s orders about which medicines to take and which to stop until after surgery.    Eating and drinking prior to surgery: follow the instructions from your surgeon    Take a shower or bath the night before surgery. Use the soap your surgeon gave you to gently clean your skin. If you do not have soap from your surgeon, use your regular soap. Do not shave or scrub the surgery site.  Wear clean pajamas and have clean sheets on your bed.           Follow-ups after your visit        Your next 10 appointments already scheduled     Jul 21, 2017   Procedure with Rishabh Logan MD   [facility] [place])    [address] [place] [address]   [phone]            Aug 01, 2017  8:00 AM CDT   [facility] Visit with Rishabh Logan MD   [facility]    [address] [facility] [id number] [phone]              Who to contact     If you have  "questions or need follow up information about today's clinic visit or your schedule please contact [facility] directly at [phone].  Normal or non-critical lab and imaging results will be communicated to you by MyChart, letter or phone within 4 business days after the clinic has received the results. If you do not hear from us within 7 days, please contact the clinic through [employer] or phone. If you have a critical or abnormal lab result, we will notify you by phone as soon as possible.  Submit refill requests through [employer] or call your pharmacy and they will forward the refill request to us. Please allow 3 business days for your refill to be completed.          Additional Information About Your Visit        [employer] Information     [employer] gives you secure access to your electronic health record. If you see a primary care provider, you can also send messages to your care team and make appointments. If you have questions, please call your primary care clinic.  If you do not have a primary care provider, please call [phone] and they will assist you.        Care EveryWhere ID     This is your Care EveryWhere ID. This could be used by other organizations to access your [facility] medical records  [id number]        Your Vitals Were     Pulse Temperature Height Pulse Oximetry BMI (Body Mass Index)       96 98.5  F (36.9  C) (Oral) 5' 3\" (1.6 m) 96% 35.61 kg/m2        Blood Pressure from Last 3 Encounters:   07/11/17 100/76   06/06/17 110/78   10/18/16 136/86    Weight from Last 3 Encounters:   07/11/17 201 lb (91.2 kg)   06/13/17 204 lb (92.5 kg)   06/06/17 201 lb (91.2 kg)              We Performed the Following     CBC with platelets     EKG 12-lead complete w/read - Clinics     Potassium        Primary Care Provider Office Phone # Fax #    Martin Howe [phone]       [facility] [address] [address] [facility] [address]        Equal Access to Services     IRO " GAAR : Hadii aad ku hadtylero Somaria gali, waaxda luqadaha, qaybta kaalmada ademary, janie diogoin hayaan irenetrinidad talbert latanya . So [facility] [phone].    ATENCIÓN: Si habla español, tiene a sharp disposición servicios gratuitos de asistencia lingüística. Llame al [phone].    We comply with applicable federal civil rights laws and [place] laws. We do not discriminate on the basis of race, color, national origin, age, disability sex, sexual orientation or gender identity.            Thank you!     Thank you for [facility]  [facility]. Our goal is always to provide you with excellent care. Hearing back from our patients is one way we can continue to improve our services. Please take a few minutes to complete the written survey that you may receive in the mail after your visit with us. Thank you!             Your Updated Medication List - Protect others around you: Learn how to safely use, store and throw away your medicines at www.disposemymeds.org.          This list is accurate as of: 7/11/17  4:32 PM.  Always use your most recent med list.                   Brand Name Dispense Instructions for use Diagnosis    albuterol 108 (90 BASE) MCG/ACT Inhaler    albuterol    2 Inhaler    Inhale 2 puffs into the lungs every 6 hours as needed for shortness of breath / dyspnea or wheezing    Mild persistent asthma without complication       amLODIPine 10 MG tablet    NORVASC    90 tablet    TAKE 1 TABLET BY MOUTH FOR BLOOD PRESSURE IN THE PM    Essential hypertension with goal blood pressure less than 140/90       budesonide 180 MCG/ACT inhaler    PULMICORT FLEXHALER    3 Inhaler    Inhale 1 puff into the lungs 2 times daily Replaces flovent for insurance    Mild persistent asthma without complication       fluticasone 220 MCG/ACT Inhaler    FLOVENT HFA    1 Inhaler    Inhale 1 puff into the lungs 2 times daily for asthma prevention.    Mild persistent asthma without complication       lisinopril 10  MG tablet    PRINIVIL/ZESTRIL    90 tablet    TAKE 1 TABLET BY MOUTH IN THE MORNING    Essential hypertension with goal blood pressure less than 140/90       simvastatin 10 MG tablet    ZOCOR    90 tablet    Take 1 tablet (10 mg) by mouth At Bedtime for cholesterol Pharmacy ok to hold prescription until due    Hyperlipidemia LDL goal <130       SINUS RINSE KIT NA       Congestion of paranasal sinus

## 2017-07-14 ENCOUNTER — ANESTHESIA EVENT (OUTPATIENT)
Dept: SURGERY | Facility: AMBULATORY SURGERY CENTER | Age: 57
End: 2017-07-14

## 2017-07-20 NOTE — ANESTHESIA PREPROCEDURE EVALUATION
Physical Exam  Normal systems: cardiovascular, pulmonary and dental    Airway   Mallampati: II  TM distance: >3 FB  Neck ROM: full    Dental     Cardiovascular   Rhythm and rate: regular and normal      Pulmonary    breath sounds clear to auscultation    Other findings: 09/10/14; 0834; Airway Size: 7.5;  Cuffed;  Oral endotracheal tube;  Blade Type: Sadia;  Blade Size: 3;  Place by: ;  Insertion Attempts: 1;  Breath Sounds: Equal, clear and bilateral;  End Tidal CO2: Present;  Dentition: Intact;  Grade View of Cords: 3                Anesthesia Plan      History & Physical Review  History and physical reviewed and following examination; no interval change.    ASA Status:  3 .    NPO Status:  > 8 hours    Plan for General and LMA with Intravenous and Propofol induction. Maintenance will be Balanced.    PONV prophylaxis:  Ondansetron (or other 5HT-3) and Dexamethasone or Solumedrol  LMA if GERD controlled. TIVA.   Pre-op nebulizer as indicated.       Postoperative Care  Postoperative pain management:  IV analgesics, Oral pain medications and Multi-modal analgesia.      Consents  Anesthetic plan, risks, benefits and alternatives discussed with:  Patient..          ANESTHESIA PREOP EVALUATION    PROCEDURE: Procedure(s):  Left knee arthroscopy, choice anesthesia - Wound Class:     HPI: Cesar Foss is a 57 year old male who presents for above procedure.    PAST MEDICAL HISTORY:    Past Medical History:   Diagnosis Date     Allergic rhinitis due to animal dander      Allergy to mold spores     1/27/15 skin tests pos. for:  cat/dog/DM/M/T/G/W      Arthritis      Bursitis     H/O/prepatellar     Diagnostic skin and sensitization tests (aka ALLERGENS) 1/27/15 skin tests pos. for:  cat/dog/DM/M/T/G/W      High cholesterol      House dust mite allergy      Hypertension goal BP (blood pressure) < 140/90 7/30/2013     LBP (low back pain)      Migraines      Obesity      OREN (obstructive sleep apnea)      PONV  (postoperative nausea and vomiting)      Seasonal allergic conjunctivitis      Seasonal allergic rhinitis      Seizure disorder (H)      Thrombocytopenia (H) 7/11/2017     Unspecified asthma(493.90)     takes Flovent inhaler only PRN       PAST SURGICAL HISTORY:    Past Surgical History:   Procedure Laterality Date     ABDOMEN SURGERY      LAP BAND     C RAD RESEC TONSIL/PILLARS       COLONOSCOPY  4/1/2013    Procedure: COLONOSCOPY;  COLONOSCOPY SCREEN;  Surgeon: Jameel Patel MD;  Location: [facility] OR     ESOPHAGOSCOPY, GASTROSCOPY, DUODENOSCOPY (EGD), COMBINED N/A 8/21/2014    Procedure: COMBINED ESOPHAGOSCOPY, GASTROSCOPY, DUODENOSCOPY (EGD), BIOPSY SINGLE OR MULTIPLE;  Surgeon: Jameel Patel MD;  Location: [facility] OR     HC PARTIAL REMOVAL, CLAVICLE  8/20/2004    Left shoulder Jose Logan     LAPAROSCOPIC CHOLECYSTECTOMY N/A 9/10/2014    Procedure: LAPAROSCOPIC CHOLECYSTECTOMY;  Surgeon: Jameel Patel MD;  Location: [facility] OR     LASIK  2001       PAST ANESTHESIA HISTORY:     No personal or family h/o anesthesia problems    SOCIAL HISTORY:       Social History   Substance Use Topics     Smoking status: Never Smoker     Smokeless tobacco: Never Used      Comment: non smoking household     Alcohol use Yes      Comment: rare       ALLERGIES:     No Known Allergies    MEDICATIONS:       (Not in a hospital admission)    Current Outpatient Prescriptions   Medication Sig Dispense Refill     budesonide (PULMICORT FLEXHALER) 180 MCG/ACT inhaler Inhale 1 puff into the lungs 2 times daily Replaces flovent for insurance 3 Inhaler 3     fluticasone (FLOVENT HFA) 220 MCG/ACT Inhaler Inhale 1 puff into the lungs 2 times daily for asthma prevention. 1 Inhaler 5     lisinopril (PRINIVIL/ZESTRIL) 10 MG tablet TAKE 1 TABLET BY MOUTH IN THE MORNING 90 tablet 0     amLODIPine (NORVASC) 10 MG tablet TAKE 1 TABLET BY MOUTH FOR BLOOD PRESSURE IN THE PM 90 tablet 0     simvastatin (ZOCOR) 10 MG tablet Take  1 tablet (10 mg) by mouth At Bedtime for cholesterol Pharmacy ok to hold prescription until due 90 tablet 3     albuterol (ALBUTEROL) 108 (90 BASE) MCG/ACT inhaler Inhale 2 puffs into the lungs every 6 hours as needed for shortness of breath / dyspnea or wheezing 2 Inhaler 1     Hypertonic Nasal Wash (SINUS RINSE KIT NA)          Current Outpatient Prescriptions Ordered in [facility]   Medication Sig Dispense Refill     budesonide (PULMICORT FLEXHALER) 180 MCG/ACT inhaler Inhale 1 puff into the lungs 2 times daily Replaces flovent for insurance 3 Inhaler 3     fluticasone (FLOVENT HFA) 220 MCG/ACT Inhaler Inhale 1 puff into the lungs 2 times daily for asthma prevention. 1 Inhaler 5     lisinopril (PRINIVIL/ZESTRIL) 10 MG tablet TAKE 1 TABLET BY MOUTH IN THE MORNING 90 tablet 0     amLODIPine (NORVASC) 10 MG tablet TAKE 1 TABLET BY MOUTH FOR BLOOD PRESSURE IN THE PM 90 tablet 0     simvastatin (ZOCOR) 10 MG tablet Take 1 tablet (10 mg) by mouth At Bedtime for cholesterol Pharmacy ok to hold prescription until due 90 tablet 3     albuterol (ALBUTEROL) 108 (90 BASE) MCG/ACT inhaler Inhale 2 puffs into the lungs every 6 hours as needed for shortness of breath / dyspnea or wheezing 2 Inhaler 1     Hypertonic Nasal Wash (SINUS RINSE KIT NA)        No current Epic-ordered facility-administered medications on file.        PHYSICAL EXAM:    Vitals: T Data Unavailable, P Data Unavailable, BP Data Unavailable, R Data Unavailable, SpO2  , Weight Wt Readings from Last 2 Encounters:   07/11/17 91.2 kg (201 lb)   06/13/17 92.5 kg (204 lb)       See doc flowsheet      No Data Recorded    No Data Recorded    No Data Recorded    No Data Recorded    No Data Recorded    No data recorded.  No data recorded.      NPO STATUS: see doc flowsheet    LABS:    BMP:  Recent Labs   Lab Test  07/11/17   1556  05/22/17   0653   NA   --   140   POTASSIUM  3.7  3.9   CHLORIDE   --   104   CO2   --   26   BUN   --   13   CR   --   0.77   GLC   --   88    BARRETT   --   9.5       LFTs:   Recent Labs   Lab Test  05/22/17   0653   PROTTOTAL  8.0   ALBUMIN  4.5   BILITOTAL  0.6   ALKPHOS  50   AST  59*   ALT  110*       CBC:   Recent Labs   Lab Test  07/11/17   1556   WBC  5.7   RBC  4.60   HGB  15.3   HCT  43.3   MCV  94   MCH  33.3*   MCHC  35.3   RDW  12.1   PLT  116*       Coags:  No results for input(s): INR, PTT, FIBR in the last 79753 hours.    Imaging:  No orders to display       Jozef Pringle MD  Anesthesiology Staff  Pager [phone]    7/20/2017  11:36 AM                    .

## 2017-07-21 ENCOUNTER — SURGERY (OUTPATIENT)
Age: 57
End: 2017-07-21

## 2017-07-21 ENCOUNTER — ANESTHESIA (OUTPATIENT)
Dept: SURGERY | Facility: AMBULATORY SURGERY CENTER | Age: 57
End: 2017-07-21

## 2017-07-21 ENCOUNTER — HOSPITAL ENCOUNTER (OUTPATIENT)
Facility: AMBULATORY SURGERY CENTER | Age: 57
Discharge: HOME | End: 2017-07-21
Attending: ORTHOPAEDIC SURGERY | Admitting: ORTHOPAEDIC SURGERY
Payer: COMMERCIAL

## 2017-07-21 VITALS
TEMPERATURE: 97.6 F | OXYGEN SATURATION: 94 % | DIASTOLIC BLOOD PRESSURE: 83 MMHG | RESPIRATION RATE: 20 BRPM | SYSTOLIC BLOOD PRESSURE: 143 MMHG

## 2017-07-21 DIAGNOSIS — M94.262 CHONDROMALACIA, KNEE, LEFT: Primary | ICD-10-CM

## 2017-07-21 DIAGNOSIS — S83.242D OTHER TEAR OF MEDIAL MENISCUS OF LEFT KNEE AS CURRENT INJURY, SUBSEQUENT ENCOUNTER: ICD-10-CM

## 2017-07-21 PROCEDURE — G8907 PT DOC NO EVENTS ON DISCHARG: HCPCS

## 2017-07-21 PROCEDURE — G8916 PT W IV AB GIVEN ON TIME: HCPCS

## 2017-07-21 PROCEDURE — 29881 ARTHRS KNE SRG MNISECTMY M/L: CPT | Mod: LT | Performed by: ORTHOPAEDIC SURGERY

## 2017-07-21 PROCEDURE — G8918 PT W/O PREOP ORDER IV AB PRO: HCPCS

## 2017-07-21 PROCEDURE — 29881 ARTHRS KNE SRG MNISECTMY M/L: CPT | Mod: LT

## 2017-07-21 RX ORDER — SODIUM CHLORIDE, SODIUM LACTATE, POTASSIUM CHLORIDE, CALCIUM CHLORIDE 600; 310; 30; 20 MG/100ML; MG/100ML; MG/100ML; MG/100ML
INJECTION, SOLUTION INTRAVENOUS CONTINUOUS
Status: DISCONTINUED | OUTPATIENT
Start: 2017-07-21 | End: 2017-07-22 | Stop reason: HOSPADM

## 2017-07-21 RX ORDER — ACETAMINOPHEN 325 MG/1
975 TABLET ORAL ONCE
Status: COMPLETED | OUTPATIENT
Start: 2017-07-21 | End: 2017-07-21

## 2017-07-21 RX ORDER — NALOXONE HYDROCHLORIDE 0.4 MG/ML
.1-.4 INJECTION, SOLUTION INTRAMUSCULAR; INTRAVENOUS; SUBCUTANEOUS
Status: DISCONTINUED | OUTPATIENT
Start: 2017-07-21 | End: 2017-07-22 | Stop reason: HOSPADM

## 2017-07-21 RX ORDER — PROPOFOL 10 MG/ML
INJECTION, EMULSION INTRAVENOUS PRN
Status: DISCONTINUED | OUTPATIENT
Start: 2017-07-21 | End: 2017-07-21

## 2017-07-21 RX ORDER — MEPERIDINE HYDROCHLORIDE 25 MG/ML
12.5 INJECTION INTRAMUSCULAR; INTRAVENOUS; SUBCUTANEOUS
Status: DISCONTINUED | OUTPATIENT
Start: 2017-07-21 | End: 2017-07-22 | Stop reason: HOSPADM

## 2017-07-21 RX ORDER — ONDANSETRON 4 MG/1
4 TABLET, ORALLY DISINTEGRATING ORAL EVERY 30 MIN PRN
Status: DISCONTINUED | OUTPATIENT
Start: 2017-07-21 | End: 2017-07-22 | Stop reason: HOSPADM

## 2017-07-21 RX ORDER — HYDROMORPHONE HYDROCHLORIDE 1 MG/ML
.3-.5 INJECTION, SOLUTION INTRAMUSCULAR; INTRAVENOUS; SUBCUTANEOUS EVERY 10 MIN PRN
Status: DISCONTINUED | OUTPATIENT
Start: 2017-07-21 | End: 2017-07-22 | Stop reason: HOSPADM

## 2017-07-21 RX ORDER — FENTANYL CITRATE 50 UG/ML
25-50 INJECTION, SOLUTION INTRAMUSCULAR; INTRAVENOUS
Status: DISCONTINUED | OUTPATIENT
Start: 2017-07-21 | End: 2017-07-22 | Stop reason: HOSPADM

## 2017-07-21 RX ORDER — FENTANYL CITRATE 50 UG/ML
INJECTION, SOLUTION INTRAMUSCULAR; INTRAVENOUS PRN
Status: DISCONTINUED | OUTPATIENT
Start: 2017-07-21 | End: 2017-07-21

## 2017-07-21 RX ORDER — ONDANSETRON 2 MG/ML
4 INJECTION INTRAMUSCULAR; INTRAVENOUS EVERY 30 MIN PRN
Status: DISCONTINUED | OUTPATIENT
Start: 2017-07-21 | End: 2017-07-22 | Stop reason: HOSPADM

## 2017-07-21 RX ORDER — LIDOCAINE HYDROCHLORIDE 20 MG/ML
INJECTION, SOLUTION INFILTRATION; PERINEURAL PRN
Status: DISCONTINUED | OUTPATIENT
Start: 2017-07-21 | End: 2017-07-21

## 2017-07-21 RX ORDER — ONDANSETRON 2 MG/ML
INJECTION INTRAMUSCULAR; INTRAVENOUS PRN
Status: DISCONTINUED | OUTPATIENT
Start: 2017-07-21 | End: 2017-07-21

## 2017-07-21 RX ORDER — GABAPENTIN 300 MG/1
300 CAPSULE ORAL ONCE
Status: COMPLETED | OUTPATIENT
Start: 2017-07-21 | End: 2017-07-21

## 2017-07-21 RX ORDER — CEFAZOLIN SODIUM 2 G/100ML
2 INJECTION, SOLUTION INTRAVENOUS
Status: COMPLETED | OUTPATIENT
Start: 2017-07-21 | End: 2017-07-21

## 2017-07-21 RX ORDER — PROPOFOL 10 MG/ML
INJECTION, EMULSION INTRAVENOUS CONTINUOUS PRN
Status: DISCONTINUED | OUTPATIENT
Start: 2017-07-21 | End: 2017-07-21

## 2017-07-21 RX ORDER — HYDROCODONE BITARTRATE AND ACETAMINOPHEN 5; 325 MG/1; MG/1
1-2 TABLET ORAL EVERY 4 HOURS PRN
Qty: 30 TABLET | Refills: 0 | Status: SHIPPED | OUTPATIENT
Start: 2017-07-21 | End: 2017-12-01

## 2017-07-21 RX ORDER — BUPIVACAINE HYDROCHLORIDE AND EPINEPHRINE 2.5; 5 MG/ML; UG/ML
INJECTION, SOLUTION INFILTRATION; PERINEURAL PRN
Status: DISCONTINUED | OUTPATIENT
Start: 2017-07-21 | End: 2017-07-21 | Stop reason: HOSPADM

## 2017-07-21 RX ORDER — LIDOCAINE 40 MG/G
CREAM TOPICAL
Status: DISCONTINUED | OUTPATIENT
Start: 2017-07-21 | End: 2017-07-22 | Stop reason: HOSPADM

## 2017-07-21 RX ORDER — ALBUTEROL SULFATE 0.83 MG/ML
2.5 SOLUTION RESPIRATORY (INHALATION) ONCE
Status: COMPLETED | OUTPATIENT
Start: 2017-07-21 | End: 2017-07-21

## 2017-07-21 RX ORDER — DEXAMETHASONE SODIUM PHOSPHATE 10 MG/ML
INJECTION, SOLUTION INTRAMUSCULAR; INTRAVENOUS PRN
Status: DISCONTINUED | OUTPATIENT
Start: 2017-07-21 | End: 2017-07-21

## 2017-07-21 RX ADMIN — FENTANYL CITRATE 50 MCG: 50 INJECTION, SOLUTION INTRAMUSCULAR; INTRAVENOUS at 07:51

## 2017-07-21 RX ADMIN — ONDANSETRON 4 MG: 2 INJECTION INTRAMUSCULAR; INTRAVENOUS at 07:27

## 2017-07-21 RX ADMIN — LIDOCAINE HYDROCHLORIDE 60 MG: 20 INJECTION, SOLUTION INFILTRATION; PERINEURAL at 07:31

## 2017-07-21 RX ADMIN — ACETAMINOPHEN 975 MG: 325 TABLET ORAL at 07:01

## 2017-07-21 RX ADMIN — SODIUM CHLORIDE, SODIUM LACTATE, POTASSIUM CHLORIDE, CALCIUM CHLORIDE: 600; 310; 30; 20 INJECTION, SOLUTION INTRAVENOUS at 07:11

## 2017-07-21 RX ADMIN — PROPOFOL 150 MCG/KG/MIN: 10 INJECTION, EMULSION INTRAVENOUS at 07:31

## 2017-07-21 RX ADMIN — GABAPENTIN 300 MG: 300 CAPSULE ORAL at 07:02

## 2017-07-21 RX ADMIN — SODIUM CHLORIDE, SODIUM LACTATE, POTASSIUM CHLORIDE, CALCIUM CHLORIDE: 600; 310; 30; 20 INJECTION, SOLUTION INTRAVENOUS at 07:27

## 2017-07-21 RX ADMIN — FENTANYL CITRATE 50 MCG: 50 INJECTION, SOLUTION INTRAMUSCULAR; INTRAVENOUS at 07:31

## 2017-07-21 RX ADMIN — ALBUTEROL SULFATE 2.5 MG: 0.83 SOLUTION RESPIRATORY (INHALATION) at 07:02

## 2017-07-21 RX ADMIN — DEXAMETHASONE SODIUM PHOSPHATE 10 MG: 10 INJECTION, SOLUTION INTRAMUSCULAR; INTRAVENOUS at 07:35

## 2017-07-21 RX ADMIN — PROPOFOL 200 MG: 10 INJECTION, EMULSION INTRAVENOUS at 07:31

## 2017-07-21 RX ADMIN — BUPIVACAINE HYDROCHLORIDE AND EPINEPHRINE 30 ML: 2.5; 5 INJECTION, SOLUTION INFILTRATION; PERINEURAL at 07:59

## 2017-07-21 RX ADMIN — CEFAZOLIN SODIUM 2 G: 2 INJECTION, SOLUTION INTRAVENOUS at 07:33

## 2017-07-21 RX ADMIN — SODIUM CHLORIDE, SODIUM LACTATE, POTASSIUM CHLORIDE, CALCIUM CHLORIDE: 600; 310; 30; 20 INJECTION, SOLUTION INTRAVENOUS at 07:53

## 2017-07-21 NOTE — ANESTHESIA POSTPROCEDURE EVALUATION
Patient: Cesar ROSE Fjerstad    Procedure(s):  Left knee arthroscopy, meniscectomy, debridement - Wound Class: I-Clean    Diagnosis:left knee medial meniscus tear, left knee chondromalacia  Diagnosis Additional Information: No value filed.    Anesthesia Type:  General, LMA    Note:  Anesthesia Post Evaluation    Patient location during evaluation: PACU  Patient participation: Able to fully participate in evaluation  Level of consciousness: awake and alert  Pain management: adequate  Airway patency: patent  Cardiovascular status: acceptable  Respiratory status: acceptable  Hydration status: acceptable  PONV: none     Anesthetic complications: None    Comments: Happy, only complaint is sore on his frenulum. Discussed likely from LMA placement pulling tongue back. Apologized.         Last vitals:  Vitals:    07/21/17 0833 07/21/17 0845 07/21/17 0851   BP: (!) 121/96 133/90 143/83   Resp: 20 20 20   Temp:   97.6  F (36.4  C)   SpO2: 95% 95% 94%         Electronically Signed By: Jozef Pringle MD  July 21, 2017  11:17 AM

## 2017-07-21 NOTE — IP AVS SNAPSHOT
MRN:[id number]                      After Visit Summary   7/21/2017    Cesar Foss    MRN: [id number]           Thank you!     Thank you for choosing [facility] for your care. Our goal is always to provide you with excellent care. Hearing back from our patients is one way we can continue to improve our services. Please take a few minutes to complete the written survey that you may receive in the mail after you visit with us. Thank you!        Patient Information     Date Of Birth          1960        About your hospital stay     You were admitted on:  July 21, 2017 You last received care in the:  [facility]    You were discharged on:  July 21, 2017       Who to Call     For medical emergencies, please call 911.  For non-urgent questions about your medical care, please call your primary care provider or clinic, [phone]  For questions related to your surgery, please call your surgery clinic        Attending Provider     Provider Specialty    Rishabh Logan MD Orthopedics       Primary Care Provider Office Phone # Fax #    Martin Krishna oHwe [phone]      After Care Instructions     Discharge Instructions       Review outpatient procedure discharge instructions with patient as directed by Provider  You may walk on the leg as soon as you wish.  Bend the knee as soon as you wish.    Most people use crutches for a couple days.  Leave initial dressing for 2 days.  Then you may remove dressing and shower.  Rewrap with ace wrap.  Leave steri-strips in place.  Weight bearing as tolerated.  Apply ice or cold packs intermittently as needed to relieve pain.   Take pain medications as much as needed, but as little as you can get away with.  They do cause constipation.  May use Aleve up to 4 tablets per day or ibuprofen up to 12 tablets per day if desired.  Aspirin - Take 1 tablet (325 mg) by mouth daily for blood thinning for 2-3 weeks.  Most people take  about a week off work.  Return to clinic 1 week after surgery.                  Your next 10 appointments already scheduled     Aug 01, 2017  8:00 AM CDT   Return Visit with Rishabh Logan MD   [facility]    [address] [place] [id number]   [phone]              Further instructions from your care team       [facility]  Same-Day Surgery   Adult Discharge Orders & Instructions   For 24 hours after surgery  1. Get plenty of rest.  A responsible adult must stay with you for at least 24 hours after you leave the hospital.   2. Do not drive or use heavy equipment.  If you have weakness or tingling, don't drive or use heavy equipment until this feeling goes away.  3. Do not drink alcohol.  4. Avoid strenuous or risky activities.  Ask for help when climbing stairs.   5. You may feel lightheaded.  IF so, sit for a few minutes before standing.  Have someone help you get up.   6. If you have nausea (feel sick to your stomach): Drink only clear liquids such as apple juice, ginger ale, broth or 7-Up.  Rest may also help.  Be sure to drink enough fluids.  Move to a regular diet as you feel able.  7. You may have a slight fever. Call the doctor if your fever is over 100 F (37.7 C) (taken under the tongue) or lasts longer than 24 hours.  8. You may have a dry mouth, a sore throat, muscle aches or trouble sleeping.  These should go away after 24 hours.  9. Do not make important or legal decisions.   Call your doctor for any of the followin.  Signs of infection (fever, growing tenderness at the surgery site, a large amount of drainage or bleeding, severe pain, foul-smelling drainage, redness, swelling).    2. It has been over 8 to 10 hours since surgery and you are still not able to urinate (pass water).    3.  Headache for over 24 hours.          Pending Results     No orders found from 2017 to 2017.             Admission Information     Date & Time Provider Department Dept. Phone    7/21/2017 Rishabh Logan MD [facility] [phone]      Your Vitals Were     Blood Pressure Temperature Respirations Pulse Oximetry          121/96 97  F (36.1  C) (Temporal) 20 95%        MyChart Information     MyChart gives you secure access to your electronic health record. If you see a primary care provider, you can also send messages to your care team and make appointments. If you have questions, please call your primary care clinic.  If you do not have a primary care provider, please call [phone] and they will assist you.        Care EveryWhere ID     This is your Care EveryWhere ID. This could be used by other organizations to access your [facility] medical records  [id number]        Equal Access to Services     KM MARIN : Josefina Parker, washahram islasqadaha, qabrisa kaalearl pope, janie graham. [facility] [phone].    ATENCIÓN: Si habla español, tiene a sharp disposición servicios gratuitos de asistencia lingüística. [facility] [phone].    We comply with applicable federal civil rights laws and [place] laws. We do not discriminate on the basis of race, color, national origin, age, disability sex, sexual orientation or gender identity.               Review of your medicines      START taking        Dose / Directions    HYDROcodone-acetaminophen 5-325 MG per tablet   Commonly known as:  NORCO   Used for:  Chondromalacia, knee, left, Other tear of medial meniscus of left knee as current injury, subsequent encounter        Dose:  1-2 tablet   Take 1-2 tablets by mouth every 4 hours as needed for other (Moderate to Severe Pain)   Quantity:  30 tablet   Refills:  0         CONTINUE these medicines which have NOT CHANGED        Dose / Directions    albuterol 108 (90 BASE) MCG/ACT Inhaler   Commonly known as:  albuterol   Used for:  Mild persistent asthma  without complication        Dose:  2 puff   Inhale 2 puffs into the lungs every 6 hours as needed for shortness of breath / dyspnea or wheezing   Quantity:  2 Inhaler   Refills:  1       amLODIPine 10 MG tablet   Commonly known as:  NORVASC   Used for:  Essential hypertension with goal blood pressure less than 140/90        TAKE 1 TABLET BY MOUTH FOR BLOOD PRESSURE IN THE PM   Quantity:  90 tablet   Refills:  0       budesonide 180 MCG/ACT inhaler   Commonly known as:  PULMICORT FLEXHALER   Used for:  Mild persistent asthma without complication        Dose:  1 puff   Inhale 1 puff into the lungs 2 times daily Replaces flovent for insurance   Quantity:  3 Inhaler   Refills:  3       fluticasone 220 MCG/ACT Inhaler   Commonly known as:  FLOVENT HFA   Used for:  Mild persistent asthma without complication        Dose:  1 puff   Inhale 1 puff into the lungs 2 times daily for asthma prevention.   Quantity:  1 Inhaler   Refills:  5       lisinopril 10 MG tablet   Commonly known as:  PRINIVIL/ZESTRIL   Used for:  Essential hypertension with goal blood pressure less than 140/90        TAKE 1 TABLET BY MOUTH IN THE MORNING   Quantity:  90 tablet   Refills:  0       simvastatin 10 MG tablet   Commonly known as:  ZOCOR   Used for:  Hyperlipidemia LDL goal <130        Dose:  10 mg   Take 1 tablet (10 mg) by mouth At Bedtime for cholesterol Pharmacy ok to hold prescription until due   Quantity:  90 tablet   Refills:  3       SINUS RINSE KIT NA   Indication:  uses syringe kit to rinse out PRN   Used for:  Congestion of paranasal sinus        Refills:  0            Where to get your medicines      Some of these will need a paper prescription and others can be bought over the counter. Ask your nurse if you have questions.     Bring a paper prescription for each of these medications     HYDROcodone-acetaminophen 5-325 MG per tablet                Protect others around you: Learn how to safely use, store and throw away your medicines  at www.disposemymeds.org.             Medication List: This is a list of all your medications and when to take them. Check marks below indicate your daily home schedule. Keep this list as a reference.      Medications           Morning Afternoon Evening Bedtime As Needed    albuterol 108 (90 BASE) MCG/ACT Inhaler   Commonly known as:  albuterol   Inhale 2 puffs into the lungs every 6 hours as needed for shortness of breath / dyspnea or wheezing                                amLODIPine 10 MG tablet   Commonly known as:  NORVASC   TAKE 1 TABLET BY MOUTH FOR BLOOD PRESSURE IN THE PM                                budesonide 180 MCG/ACT inhaler   Commonly known as:  PULMICORT FLEXHALER   Inhale 1 puff into the lungs 2 times daily Replaces flovent for insurance                                fluticasone 220 MCG/ACT Inhaler   Commonly known as:  FLOVENT HFA   Inhale 1 puff into the lungs 2 times daily for asthma prevention.                                HYDROcodone-acetaminophen 5-325 MG per tablet   Commonly known as:  NORCO   Take 1-2 tablets by mouth every 4 hours as needed for other (Moderate to Severe Pain)                                lisinopril 10 MG tablet   Commonly known as:  PRINIVIL/ZESTRIL   TAKE 1 TABLET BY MOUTH IN THE MORNING                                simvastatin 10 MG tablet   Commonly known as:  ZOCOR   Take 1 tablet (10 mg) by mouth At Bedtime for cholesterol Pharmacy ok to hold prescription until due                                SINUS RINSE KIT NA

## 2017-07-21 NOTE — ANESTHESIA CARE TRANSFER NOTE
Patient: Cesar A Fjerstad    Procedure(s):  Left knee arthroscopy, meniscectomy, debridement - Wound Class: I-Clean    Diagnosis: left knee medial meniscus tear, left knee chondromalacia  Diagnosis Additional Information: No value filed.    Anesthesia Type:   General, LMA     Note:  Airway :Face Mask  Patient transferred to:PACU  Comments: /70, RR 12, HR 69, Sats 95%, Tem p97.5      Vitals: (Last set prior to Anesthesia Care Transfer)    CRNA VITALS  7/21/2017 0754 - 7/21/2017 0824      7/21/2017             Pulse: 92    SpO2: 96 %                Electronically Signed By: MANDA Williamson CRNA  July 21, 2017  8:24 AM

## 2017-07-21 NOTE — IP AVS SNAPSHOT
[facility]    [address] [place] [address]    Phone:  [phone]                                       After Visit Summary   7/21/2017    Cesar Foss    MRN: [id number]           After Visit Summary Signature Page     I have received my discharge instructions, and my questions have been answered. I have discussed any challenges I see with this plan with the nurse or doctor.    ..........................................................................................................................................  Patient/Patient Representative Signature      ..........................................................................................................................................  Patient Representative Print Name and Relationship to Patient    ..................................................               ................................................  Date                                            Time    ..........................................................................................................................................  Reviewed by Signature/Title    ...................................................              ..............................................  Date                                                            Time

## 2017-07-21 NOTE — DISCHARGE INSTRUCTIONS
[facility]  Same-Day Surgery   Adult Discharge Orders & Instructions   For 24 hours after surgery  1. Get plenty of rest.  A responsible adult must stay with you for at least 24 hours after you leave the hospital.   2. Do not drive or use heavy equipment.  If you have weakness or tingling, don't drive or use heavy equipment until this feeling goes away.  3. Do not drink alcohol.  4. Avoid strenuous or risky activities.  Ask for help when climbing stairs.   5. You may feel lightheaded.  IF so, sit for a few minutes before standing.  Have someone help you get up.   6. If you have nausea (feel sick to your stomach): Drink only clear liquids such as apple juice, ginger ale, broth or 7-Up.  Rest may also help.  Be sure to drink enough fluids.  Move to a regular diet as you feel able.  7. You may have a slight fever. Call the doctor if your fever is over 100 F (37.7 C) (taken under the tongue) or lasts longer than 24 hours.  8. You may have a dry mouth, a sore throat, muscle aches or trouble sleeping.  These should go away after 24 hours.  9. Do not make important or legal decisions.   Call your doctor for any of the followin.  Signs of infection (fever, growing tenderness at the surgery site, a large amount of drainage or bleeding, severe pain, foul-smelling drainage, redness, swelling).    2. It has been over 8 to 10 hours since surgery and you are still not able to urinate (pass water).    3.  Headache for over 24 hours.

## 2017-07-21 NOTE — BRIEF OP NOTE
PROCEDURE NOTE:    Pre-operative diagnosis: left knee medial meniscus tear, left knee chondromalacia   Post-operative diagnosis: Left knee medial meniscus tear, chondromalacia   Procedure: Procedure(s):  Left knee arthroscopy with partial medial meniscectomy and chondroplasty   Surgeon: Rishabh Logan MD   Assistant(s): Royce Domínguez PA-C   Estimated blood loss: Minimal   Specimens: None   Findings: Left knee medial meniscus tear  Chondromalacia   Anesthesia: General endotracheal anesthesia   Drains: None   Complications: None   Weight bearing status: Weight bearing as tolerated   Activity: Activity as tolerated  Patient may move about with assist as indicated or with supervision     Royce Domínguez PA-C  Supervising physician: Rishabh Logan MD  Dept. of Orthopedics  [facility]

## 2017-07-21 NOTE — OR NURSING
Pt noted a sore area under his tongue- MDA assessed and informed pt to do saltwater rinses.  Pt understood and will cont to monitor.

## 2017-07-24 NOTE — OP NOTE
Surgeon / Clinician: Rishabh Logan MD    Date of surgery:  07/21/2017    Preoperative diagnoses:    1.  Left knee medial meniscus tear.  2.  Chondromalacia.    Postoperative diagnoses:    1.  Left knee medial meniscus tear.  2.  Chondromalacia.    Procedure:    1.  Left knee arthroscopy with partial medial meniscectomy.  2.  Patellar shaving.    History:  Cesar Foss is a 57-year-old male with left knee pain.  MRI scan shows medial meniscus tear as well as chondromalacia of the patellofemoral joint and medial joint.  He presents now for arthroscopy and debridement.      Surgical findings:  The patellofemoral joint showed grade 2-3 localized changes on the patella, especially at the midportion.  There was grade 2 changes on the trochlea.  The medial joint showed a flap tear of the medial meniscus posteriorly.  The superior posterior horn was also torn and flipped back into the notch.  There was grade 2 chondromalacia changes on the medial femur, grade 1 on the tibia.  Lateral joint showed no meniscus tear and no chondromalacia changes.  The cruciate ligaments were intact.      Description of procedure:  After a smooth general endotracheal anesthesia, the patient's left leg was exsanguinated, tourniquet inflated to 300 mmHg.  The leg was the prepped and draped in sterile fashion.  Pause was performed for patient identification.  Arthroscopy was then performed through 3 portals with the above findings.  Patella was shaved of fibrillated cartilage.  We did a minor amount of shaving from the trochlea as well.  We then performed medial meniscectomy with basket forceps and shaver.  We removed about 30% of the meniscal tissue, removing the flap tear, a portion of the posterior horn and the flipped portion of the posterior horn which was up in the notch.  We then shaved the medial femur removing fibrillated cartilage.  At the conclusion, the knee was thoroughly irrigated and drained of fluid.  Portals were injected  with 0.25% Marcaine and closed with Steri-Strips.  Sterile dressings were applied and the patient was taken to the recovery room in stable condition.    Surgeon:  Rishabh Logan MD    Assistant:  Richie Logan MD    D:  07/21/2017 09:38 T:  07/23/2017 20:09  Document:  [id number] DL\CD

## 2017-08-01 ENCOUNTER — OFFICE VISIT (OUTPATIENT)
Dept: ORTHOPEDICS | Facility: CLINIC | Age: 57
End: 2017-08-01
Payer: COMMERCIAL

## 2017-08-01 ENCOUNTER — RADIANT APPOINTMENT (OUTPATIENT)
Dept: GENERAL RADIOLOGY | Facility: CLINIC | Age: 57
End: 2017-08-01
Attending: ORTHOPAEDIC SURGERY
Payer: COMMERCIAL

## 2017-08-01 VITALS — TEMPERATURE: 98.3 F | RESPIRATION RATE: 18 BRPM | HEIGHT: 63 IN | BODY MASS INDEX: 35.44 KG/M2 | WEIGHT: 200 LBS

## 2017-08-01 DIAGNOSIS — Z98.890 S/P ARTHROSCOPY OF LEFT KNEE: ICD-10-CM

## 2017-08-01 DIAGNOSIS — M94.261 CHONDROMALACIA, KNEE, RIGHT: ICD-10-CM

## 2017-08-01 DIAGNOSIS — M25.562 LEFT KNEE PAIN, UNSPECIFIED CHRONICITY: ICD-10-CM

## 2017-08-01 DIAGNOSIS — S83.242D OTHER TEAR OF MEDIAL MENISCUS OF LEFT KNEE AS CURRENT INJURY, SUBSEQUENT ENCOUNTER: ICD-10-CM

## 2017-08-01 DIAGNOSIS — M25.562 LEFT KNEE PAIN, UNSPECIFIED CHRONICITY: Primary | ICD-10-CM

## 2017-08-01 DIAGNOSIS — M25.561 RIGHT KNEE PAIN, UNSPECIFIED CHRONICITY: ICD-10-CM

## 2017-08-01 PROCEDURE — 99024 POSTOP FOLLOW-UP VISIT: CPT | Performed by: ORTHOPAEDIC SURGERY

## 2017-08-01 PROCEDURE — 73562 X-RAY EXAM OF KNEE 3: CPT | Mod: RT
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
## 2017-08-01 NOTE — NURSING NOTE
"Chief Complaint   Patient presents with     RECHECK     Left knee scope on 7/21/17.       Initial Temp 98.3  F (36.8  C)  Resp 18  Ht 1.6 m (5' 3\")  Wt 90.7 kg (200 lb)  BMI 35.43 kg/m2 Estimated body mass index is 35.43 kg/(m^2) as calculated from the following:    Height as of this encounter: 1.6 m (5' 3\").    Weight as of this encounter: 90.7 kg (200 lb).  Medication Reconciliation: complete   Angy Crooks MA      "

## 2017-08-01 NOTE — MR AVS SNAPSHOT
After Visit Summary   8/1/2017    Cesar Foss    MRN: [id number]           Patient Information     Date Of Birth          1960        Visit Information        Provider Department      8/1/2017 8:00 AM Rishabh Logan MD [facility]        Today's Diagnoses     Left knee pain, unspecified chronicity    -  1      Care Instructions      Preop physical with primary physician is needed within 30 days of the surgery.  Nothing to eat or drink for 8 hours before surgery.  For same day surgery you need a ride.  Someone should stay with you for 12 hours afterward.  Stop blood thinners 5 days before surgery (aspirin, warfarin, anti-inflammatories).  Stop Plavix at least 10-14 days before surgery.    After arthroscopy I will give you these instructions:  You may walk on the leg as soon as you wish.  Bend the knee as soon as you wish.   Most people use crutches for a couple days.  Leave initial dressing for 2 days.  Then you may remove dressing and shower.  Rewrap with ace wrap.  Leave steri-strips in place.  Weight bearing as tolerated.  Apply ice or cold packs intermittently as needed to relieve pain.   Take pain medications as much as needed, but as little as you can get away with.  They do cause constipation.  May use Aleve up to 4 tablets per day or ibuprofen up to 12 tablets per day if desired..  Most people take about a week off work.  I recommend taking one aspirin 325 mg / day for 3 weeks to minimize chance of a blood clot.  Return to clinic 1 week after surgery.    Call [phone] to schedule your surgery.              Follow-ups after your visit        Who to contact     If you have questions or need follow up information about today's clinic visit or your schedule please contact [facility] directly at [phone].  Normal or non-critical lab and imaging results will be communicated to you by MyChart, letter or phone within 4 business days after  "the clinic has received the results. If you do not hear from us within 7 days, please contact the clinic through bluepulse or phone. If you have a critical or abnormal lab result, we will notify you by phone as soon as possible.  Submit refill requests through bluepulse or call your pharmacy and they will forward the refill request to us. Please allow 3 business days for your refill to be completed.          Additional Information About Your Visit        [facility] Information     bluepulse gives you secure access to your electronic health record. If you see a primary care provider, you can also send messages to your care team and make appointments. If you have questions, please call your primary care clinic.  If you do not have a primary care provider, please call [phone] and they will assist you.        Care EveryWhere ID     This is your Care EveryWhere ID. This could be used by other organizations to access your [facility] medical records  [id number]        Your Vitals Were     Temperature Respirations Height BMI (Body Mass Index)          98.3  F (36.8  C) 18 1.6 m (5' 3\") 35.43 kg/m2         Blood Pressure from Last 3 Encounters:   07/21/17 143/83   07/11/17 100/76   06/06/17 110/78    Weight from Last 3 Encounters:   08/01/17 90.7 kg (200 lb)   07/11/17 91.2 kg (201 lb)   06/13/17 92.5 kg (204 lb)              Today, you had the following     No orders found for display       Primary Care Provider Office Phone # Fax #    Martin Krishna Howe [phone]       [facility] [address] [facility] [address]        Equal Access to Services     [facility] : Hadii aad ku hadasho Soomaali, waaxda luqadaha, qaybta kaalmada janie pope. So [facility] [phone].    ATENCIÓN: Si habla español, tiene a sharp disposición servicios gratuitos de asistencia lingüística. Llame al [phone].    We comply with applicable federal civil rights laws and [place] " laws. We do not discriminate on the basis of race, color, national origin, age, disability sex, sexual orientation or gender identity.            Thank you!     Thank you for choosing [facility]  for your care. Our goal is always to provide you with excellent care. Hearing back from our patients is one way we can continue to improve our services. Please take a few minutes to complete the written survey that you may receive in the mail after your visit with us. Thank you!             Your Updated Medication List - Protect others around you: Learn how to safely use, store and throw away your medicines at www.disposemymeds.org.          This list is accurate as of: 8/1/17  8:31 AM.  Always use your most recent med list.                   Brand Name Dispense Instructions for use Diagnosis    albuterol 108 (90 BASE) MCG/ACT Inhaler    albuterol    2 Inhaler    Inhale 2 puffs into the lungs every 6 hours as needed for shortness of breath / dyspnea or wheezing    Mild persistent asthma without complication       amLODIPine 10 MG tablet    NORVASC    90 tablet    TAKE 1 TABLET BY MOUTH FOR BLOOD PRESSURE IN THE PM    Essential hypertension with goal blood pressure less than 140/90       budesonide 180 MCG/ACT inhaler    PULMICORT FLEXHALER    3 Inhaler    Inhale 1 puff into the lungs 2 times daily Replaces flovent for insurance    Mild persistent asthma without complication       fluticasone 220 MCG/ACT Inhaler    FLOVENT HFA    1 Inhaler    Inhale 1 puff into the lungs 2 times daily for asthma prevention.    Mild persistent asthma without complication       HYDROcodone-acetaminophen 5-325 MG per tablet    NORCO    30 tablet    Take 1-2 tablets by mouth every 4 hours as needed for other (Moderate to Severe Pain)    Chondromalacia, knee, left, Other tear of medial meniscus of left knee as current injury, subsequent encounter       lisinopril 10 MG tablet    PRINIVIL/ZESTRIL    90 tablet    TAKE 1 TABLET  BY MOUTH IN THE MORNING    Essential hypertension with goal blood pressure less than 140/90       simvastatin 10 MG tablet    ZOCOR    90 tablet    Take 1 tablet (10 mg) by mouth At Bedtime for cholesterol Pharmacy ok to hold prescription until due    Hyperlipidemia LDL goal <130       SINUS RINSE KIT NA       Congestion of paranasal sinus

## 2017-08-01 NOTE — PATIENT INSTRUCTIONS
Preop physical with primary physician is needed within 30 days of the surgery.  Nothing to eat or drink for 8 hours before surgery.  For same day surgery you need a ride.  Someone should stay with you for 12 hours afterward.  Stop blood thinners 5 days before surgery (aspirin, warfarin, anti-inflammatories).  Stop Plavix at least 10-14 days before surgery.    After arthroscopy I will give you these instructions:  You may walk on the leg as soon as you wish.  Bend the knee as soon as you wish.   Most people use crutches for a couple days.  Leave initial dressing for 2 days.  Then you may remove dressing and shower.  Rewrap with ace wrap.  Leave steri-strips in place.  Weight bearing as tolerated.  Apply ice or cold packs intermittently as needed to relieve pain.   Take pain medications as much as needed, but as little as you can get away with.  They do cause constipation.  May use Aleve up to 4 tablets per day or ibuprofen up to 12 tablets per day if desired..  Most people take about a week off work.  I recommend taking one aspirin 325 mg / day for 3 weeks to minimize chance of a blood clot.  Return to clinic 1 week after surgery.    Call [phone] to schedule your surgery.
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
## 2017-08-06 DIAGNOSIS — J30.2 SEASONAL ALLERGIC RHINITIS: ICD-10-CM

## 2017-08-06 DIAGNOSIS — I10 ESSENTIAL HYPERTENSION WITH GOAL BLOOD PRESSURE LESS THAN 140/90: ICD-10-CM

## 2017-08-07 RX ORDER — AMLODIPINE BESYLATE 10 MG/1
TABLET ORAL
Qty: 90 TABLET | Refills: 3 | Status: SHIPPED | OUTPATIENT
Start: 2017-08-07 | End: 2018-08-07

## 2017-08-07 RX ORDER — LORATADINE 10 MG/1
TABLET ORAL
Qty: 90 TABLET | Refills: 3 | OUTPATIENT
Start: 2017-08-07

## 2017-08-07 RX ORDER — LISINOPRIL 10 MG/1
TABLET ORAL
Qty: 90 TABLET | Refills: 3 | Status: SHIPPED | OUTPATIENT
Start: 2017-08-07 | End: 2018-08-07

## 2017-08-14 ENCOUNTER — ANESTHESIA EVENT (OUTPATIENT)
Dept: SURGERY | Facility: AMBULATORY SURGERY CENTER | Age: 57
End: 2017-08-14

## 2017-08-29 ENCOUNTER — OFFICE VISIT (OUTPATIENT)
Dept: FAMILY MEDICINE | Facility: CLINIC | Age: 57
End: 2017-08-29
Payer: COMMERCIAL

## 2017-08-29 VITALS
OXYGEN SATURATION: 98 % | SYSTOLIC BLOOD PRESSURE: 120 MMHG | DIASTOLIC BLOOD PRESSURE: 64 MMHG | HEART RATE: 80 BPM | WEIGHT: 203 LBS | TEMPERATURE: 98.1 F | BODY MASS INDEX: 35.97 KG/M2 | HEIGHT: 63 IN

## 2017-08-29 DIAGNOSIS — Z01.818 PREOP GENERAL PHYSICAL EXAM: Primary | ICD-10-CM

## 2017-08-29 PROCEDURE — 99214 OFFICE O/P EST MOD 30 MIN: CPT | Performed by: FAMILY MEDICINE

## 2017-08-29 NOTE — ANESTHESIA PREPROCEDURE EVALUATION
Physical Exam  Normal systems: cardiovascular, pulmonary and dental    Airway   Mallampati: II  TM distance: >3 FB  Neck ROM: full    Dental     Cardiovascular   Rhythm and rate: regular and normal      Pulmonary    breath sounds clear to auscultation    Other findings: 09/10/14; 0834; Airway Size: 7.5;  Cuffed;  Oral endotracheal tube;  Blade Type: Sadia;  Blade Size: 3;  Place by: ;  Insertion Attempts: 1;  Breath Sounds: Equal, clear and bilateral;  End Tidal CO2: Present;  Dentition: Intact;  Grade View of Cords: 3                Anesthesia Plan      History & Physical Review  History and physical reviewed and following examination; no interval change.    ASA Status:  3 .    NPO Status:  > 8 hours    Plan for General and LMA with Propofol and Intravenous induction. Maintenance will be TIVA.    PONV prophylaxis:  Ondansetron (or other 5HT-3) and Dexamethasone or Solumedrol       Postoperative Care  Postoperative pain management:  IV analgesics, Oral pain medications and Multi-modal analgesia.      Consents  Anesthetic plan, risks, benefits and alternatives discussed with:  Patient..            ANESTHESIA PREOP EVALUATION    PROCEDURE: Procedure(s):  Right knee arthroscopy, choice anesthesia - Wound Class:     HPI: Cesar Foss is a 57 year old male who presents for above procedure.    PAST MEDICAL HISTORY:    Past Medical History:   Diagnosis Date     Allergic rhinitis due to animal dander      Allergy to mold spores     1/27/15 skin tests pos. for:  cat/dog/DM/M/T/G/W      Arthritis      Bursitis     H/O/prepatellar     Diagnostic skin and sensitization tests (aka ALLERGENS) 1/27/15 skin tests pos. for:  cat/dog/DM/M/T/G/W      High cholesterol      House dust mite allergy      Hypertension goal BP (blood pressure) < 140/90 7/30/2013     LBP (low back pain)      Migraines      Obesity      OREN (obstructive sleep apnea)      PONV (postoperative nausea and vomiting)      Seasonal allergic  conjunctivitis      Seasonal allergic rhinitis      Seizure disorder (H)      Thrombocytopenia (H) 7/11/2017     Unspecified asthma(493.90)     takes Flovent inhaler only PRN       PAST SURGICAL HISTORY:    Past Surgical History:   Procedure Laterality Date     ABDOMEN SURGERY      LAP BAND     ARTHROSCOPY KNEE Left 7/21/2017    Procedure: ARTHROSCOPY KNEE;  Left knee arthroscopy, meniscectomy, debridement;  Surgeon: Rishabh Logan MD;  Location: [facility] OR     C RAD RESEC TONSIL/PILLARS       COLONOSCOPY  4/1/2013    Procedure: COLONOSCOPY;  COLONOSCOPY SCREEN;  Surgeon: Jameel Patel MD;  Location: [facility] OR     ESOPHAGOSCOPY, GASTROSCOPY, DUODENOSCOPY (EGD), COMBINED N/A 8/21/2014    Procedure: COMBINED ESOPHAGOSCOPY, GASTROSCOPY, DUODENOSCOPY (EGD), BIOPSY SINGLE OR MULTIPLE;  Surgeon: Jameel Patel MD;  Location: [facility] OR     HC PARTIAL REMOVAL, CLAVICLE  8/20/2004    Left shoulder Jose [address] Dr Logan     LAPAROSCOPIC CHOLECYSTECTOMY N/A 9/10/2014    Procedure: LAPAROSCOPIC CHOLECYSTECTOMY;  Surgeon: Jameel Patel MD;  Location: [facility] OR     LASIK  2001       PAST ANESTHESIA HISTORY:     No personal or family h/o anesthesia problems    SOCIAL HISTORY:       Social History   Substance Use Topics     Smoking status: Never Smoker     Smokeless tobacco: Never Used      Comment: non smoking household     Alcohol use Yes      Comment: 10-15 drinks a month       ALLERGIES:     No Known Allergies    MEDICATIONS:       (Not in a hospital admission)    Current Outpatient Prescriptions   Medication Sig Dispense Refill     amLODIPine (NORVASC) 10 MG tablet TAKE 1 TABLET BY MOUTH FOR BLOOD PRESSURE IN THE PM 90 tablet 3     lisinopril (PRINIVIL/ZESTRIL) 10 MG tablet TAKE 1 TABLET BY MOUTH IN THE MORNING 90 tablet 3     HYDROcodone-acetaminophen (NORCO) 5-325 MG per tablet Take 1-2 tablets by mouth every 4 hours as needed for other (Moderate to Severe Pain) 30 tablet 0     budesonide (PULMICORT  FLEXHALER) 180 MCG/ACT inhaler Inhale 1 puff into the lungs 2 times daily Replaces flovent for insurance 3 Inhaler 3     fluticasone (FLOVENT HFA) 220 MCG/ACT Inhaler Inhale 1 puff into the lungs 2 times daily for asthma prevention. 1 Inhaler 5     simvastatin (ZOCOR) 10 MG tablet Take 1 tablet (10 mg) by mouth At Bedtime for cholesterol Pharmacy ok to hold prescription until due 90 tablet 3     albuterol (ALBUTEROL) 108 (90 BASE) MCG/ACT inhaler Inhale 2 puffs into the lungs every 6 hours as needed for shortness of breath / dyspnea or wheezing 2 Inhaler 1     Hypertonic Nasal Wash (SINUS RINSE KIT NA)        order for DME CPAP supplies:  Mask, hose, tank, filters 1 each 0       Current Outpatient Prescriptions Ordered in [facility]   Medication Sig Dispense Refill     amLODIPine (NORVASC) 10 MG tablet TAKE 1 TABLET BY MOUTH FOR BLOOD PRESSURE IN THE PM 90 tablet 3     lisinopril (PRINIVIL/ZESTRIL) 10 MG tablet TAKE 1 TABLET BY MOUTH IN THE MORNING 90 tablet 3     HYDROcodone-acetaminophen (NORCO) 5-325 MG per tablet Take 1-2 tablets by mouth every 4 hours as needed for other (Moderate to Severe Pain) 30 tablet 0     budesonide (PULMICORT FLEXHALER) 180 MCG/ACT inhaler Inhale 1 puff into the lungs 2 times daily Replaces flovent for insurance 3 Inhaler 3     fluticasone (FLOVENT HFA) 220 MCG/ACT Inhaler Inhale 1 puff into the lungs 2 times daily for asthma prevention. 1 Inhaler 5     simvastatin (ZOCOR) 10 MG tablet Take 1 tablet (10 mg) by mouth At Bedtime for cholesterol Pharmacy ok to hold prescription until due 90 tablet 3     albuterol (ALBUTEROL) 108 (90 BASE) MCG/ACT inhaler Inhale 2 puffs into the lungs every 6 hours as needed for shortness of breath / dyspnea or wheezing 2 Inhaler 1     Hypertonic Nasal Wash (SINUS RINSE KIT NA)        order for DME CPAP supplies:  Mask, hose, tank, filters 1 each 0     No current [facility]-ordered facility-administered medications on file.        PHYSICAL EXAM:    Vitals: T Data  Unavailable, P Data Unavailable, BP Data Unavailable, R Data Unavailable, SpO2  , Weight Wt Readings from Last 2 Encounters:   08/01/17 90.7 kg (200 lb)   07/11/17 91.2 kg (201 lb)       See doc flowsheet      No Data Recorded    No Data Recorded    No Data Recorded    No Data Recorded    No Data Recorded    No data recorded.  No data recorded.      NPO STATUS: see doc flowsheet    LABS:    BMP:  Recent Labs   Lab Test  07/11/17   1556  05/22/17   0653   NA   --   140   POTASSIUM  3.7  3.9   CHLORIDE   --   104   CO2   --   26   BUN   --   13   CR   --   0.77   GLC   --   88   BARRETT   --   9.5       LFTs:   Recent Labs   Lab Test  05/22/17   0653   PROTTOTAL  8.0   ALBUMIN  4.5   BILITOTAL  0.6   ALKPHOS  50   AST  59*   ALT  110*       CBC:   Recent Labs   Lab Test  07/11/17   1556   WBC  5.7   RBC  4.60   HGB  15.3   HCT  43.3   MCV  94   MCH  33.3*   MCHC  35.3   RDW  12.1   PLT  116*       Coags:  No results for input(s): INR, PTT, FIBR in the last 76994 hours.    Imaging:  No orders to display       Jozef Pringle MD  Anesthesiology Staff  Pager [phone]    8/28/2017  10:54 PM                  .

## 2017-08-29 NOTE — MR AVS SNAPSHOT
After Visit Summary   8/29/2017    Cesar Foss    MRN: [id number]           Patient Information     Date Of Birth          1960        Visit Information        Provider Department      8/29/2017 5:50 PM Martin Howe MD [facility]        Today's Diagnoses     Preop general physical exam    -  1      Care Instructions      Before Your Surgery      Call your surgeon if there is any change in your health. This includes signs of a cold or flu (such as a sore throat, runny nose, cough, rash or fever).    Do not smoke, drink alcohol or take over the counter medicine (unless your surgeon or primary care doctor tells you to) for the 24 hours before and after surgery.    If you take prescribed drugs: Follow your doctor s orders about which medicines to take and which to stop until after surgery.    Eating and drinking prior to surgery: follow the instructions from your surgeon    Take a shower or bath the night before surgery. Use the soap your surgeon gave you to gently clean your skin. If you do not have soap from your surgeon, use your regular soap. Do not shave or scrub the surgery site.  Wear clean pajamas and have clean sheets on your bed.           Follow-ups after your visit        Your next 10 appointments already scheduled     Sep 01, 2017   Procedure with Rishabh Logan MD   [facility]    [address] [place] [address] [phone]            Sep 07, 2017  1:30 PM CDT   Return Visit with Rishabh Logan MD   [facility] [place] ([facility] [place])    [address]  [place] [id number] [phone]              Who to contact     If you have questions or need follow up information about today's clinic visit or your schedule please contact [facility] directly at [phone].  Normal or non-critical lab and imaging results will be communicated to you by MyChart, letter or phone within 4  "business days after the clinic has received the results. If you do not hear from us within 7 days, please contact the clinic through SpamLion or phone. If you have a critical or abnormal lab result, we will notify you by phone as soon as possible.  Submit refill requests through SpamLion or call your pharmacy and they will forward the refill request to us. Please allow 3 business days for your refill to be completed.          Additional Information About Your Visit        Assay DepotharYOOSE Information     Spam[employer]ion gives you secure access to your electronic health record. If you see a primary care provider, you can also send messages to your care team and make appointments. If you have questions, please call your primary care clinic.  If you do not have a primary care provider, please call [phone] and they will assist you.        Care EveryWhere ID     This is your Care EveryWhere ID. This could be used by other organizations to access your [facility] medical records  [id number]        Your Vitals Were     Pulse Temperature Height Pulse Oximetry BMI (Body Mass Index)       80 98.1  F (36.7  C) (Oral) 5' 3\" (1.6 m) 98% 35.96 kg/m2        Blood Pressure from Last 3 Encounters:   08/29/17 120/64   07/21/17 143/83   07/11/17 100/76    Weight from Last 3 Encounters:   08/29/17 203 lb (92.1 kg)   08/01/17 200 lb (90.7 kg)   07/11/17 201 lb (91.2 kg)              Today, you had the following     No orders found for display       Primary Care Provider Office Phone # Fax #    Martin Krishna Howe [phone] [address] [facility] [address]        Equal Access to Services     [facility] : Hadii elicia Parker, waserada leanne, qaybta sonaljanie mena. So [facility] [phone].    ATENCIÓN: Si habla español, tiene a sharp disposición servicios gratuitos de asistencia lingüística. Masha al [phone].    We comply with applicable federal civil rights laws and " [place] laws. We do not discriminate on the basis of race, color, national origin, age, disability sex, sexual orientation or gender identity.            Thank you!     [facility] you [facility]. Our goal is always to provide you with excellent care. Hearing back from our patients is one way we can continue to improve our services. Please take a few minutes to complete the written survey that you may receive in the mail after your visit with us. Thank you!             Your Updated Medication List - Protect others around you: Learn how to safely use, store and throw away your medicines at www.disposemymeds.org.          This list is accurate as of: 8/29/17  7:37 PM.  Always use your most recent med list.                   Brand Name Dispense Instructions for use Diagnosis    albuterol 108 (90 BASE) MCG/ACT Inhaler    PROAIR HFA    2 Inhaler    Inhale 2 puffs into the lungs every 6 hours as needed for shortness of breath / dyspnea or wheezing    Mild persistent asthma without complication       amLODIPine 10 MG tablet    NORVASC    90 tablet    TAKE 1 TABLET BY MOUTH FOR BLOOD PRESSURE IN THE PM    Essential hypertension with goal blood pressure less than 140/90       budesonide 180 MCG/ACT inhaler    PULMICORT FLEXHALER    3 Inhaler    Inhale 1 puff into the lungs 2 times daily Replaces flovent for insurance    Mild persistent asthma without complication       fluticasone 220 MCG/ACT Inhaler    FLOVENT HFA    1 Inhaler    Inhale 1 puff into the lungs 2 times daily for asthma prevention.    Mild persistent asthma without complication       HYDROcodone-acetaminophen 5-325 MG per tablet    NORCO    30 tablet    Take 1-2 tablets by mouth every 4 hours as needed for other (Moderate to Severe Pain)    Chondromalacia, knee, left, Other tear of medial meniscus of left knee as current injury, subsequent encounter       lisinopril 10 MG tablet    PRINIVIL/ZESTRIL    90 tablet    TAKE 1  TABLET BY MOUTH IN THE MORNING    Essential hypertension with goal blood pressure less than 140/90       order for DME     1 each    CPAP supplies:  Mask, hose, tank, filters    OREN (obstructive sleep apnea)       simvastatin 10 MG tablet    ZOCOR    90 tablet    Take 1 tablet (10 mg) by mouth At Bedtime for cholesterol Pharmacy ok to hold prescription until due    Hyperlipidemia LDL goal <130       SINUS RINSE KIT NA       Congestion of paranasal sinus

## 2017-08-29 NOTE — NURSING NOTE
"Chief Complaint   Patient presents with     Pre-Op Exam       Initial /64  Pulse 80  Temp 98.1  F (36.7  C) (Oral)  Ht 5' 3\" (1.6 m)  Wt 203 lb (92.1 kg)  SpO2 98%  BMI 35.96 kg/m2 Estimated body mass index is 35.96 kg/(m^2) as calculated from the following:    Height as of this encounter: 5' 3\" (1.6 m).    Weight as of this encounter: 203 lb (92.1 kg).  Medication Reconciliation: complete   Venus Topete CMA    "

## 2017-08-29 NOTE — PROGRESS NOTES
.    [facility]  [address] [facility] [address] [phone]  Dept: [phone]    PRE-OP EVALUATION:  Today's date: 2017    Cesar Foss (: 1960) presents for pre-operative evaluation assessment as requested by Dr. Lgoan.  He requires evaluation and anesthesia risk assessment prior to undergoing surgery/procedure for treatment of right knee   .  Proposed procedure: scope  left knee scope 6 weeks went well. No complications.   Date of Surgery/ Procedure: 2017  Time of Surgery/ Procedure: 7:15 am  Hospital/Surgical Facility: [facility]    Primary Physician: Martin Howe  Type of Anesthesia Anticipated: to be determined    Patient has a Health Care Directive or Living Will:  NO    Preop Questions 2017   1.  Do you have a history of heart attack, stroke, stent, bypass or surgery on an artery in the head, neck, heart or legs? No   2.  Do you ever have any pain or discomfort in your chest? No   3.  Do you have a history of  Heart Failure? No   4.   Are you troubled by shortness of breath when:  walking on a level surface, or up a slight hill, or at night? No   5.  Do you currently have a cold, bronchitis or other respiratory infection? No   6.  Do you have a cough, shortness of breath, or wheezing? No   7.  Do you sometimes get pains in the calves of your legs when you walk? No   8. Do you or anyone in your family have previous history of blood clots? No   9.  Do you or does anyone in your family have a serious bleeding problem such as prolonged bleeding following surgeries or cuts? No   10. Have you ever had problems with anemia or been told to take iron pills? No   11. Have you had any abnormal blood loss such as black, tarry or bloody stools? No   12. Have you ever had a blood transfusion? No   13. Have you or any of your relatives ever had problems with anesthesia? YES - Cesar   14. Do you have sleep apnea, excessive snoring or daytime drowsiness? YES -  cpap   15. Do you have any prosthetic heart valves? No   16. Do you have prosthetic joints? No           HPI:                                                      Brief HPI related to upcoming procedure: chronic right knee pain      HYPERTENSION - Patient has longstanding history of mod-severe HTN , currently denies any symptoms referable to elevated blood pressure. Specifically denies chest pain, palpitations, dyspnea, orthopnea, PND or peripheral edema. Blood pressure readings have been in normal range. Current medication regimen is as listed below. Patient denies any side effects of medication.                                                                                                                                                                                          .  HYPERLIPIDEMIA - Patient has a long history of significant Hyperlipidemia requiring medication for treatment with recent good control. Patient reports no problems or side effects with the medication.                                                                                                                                                       .  ASTHMA - stable                                                                                                                                           .  SLEEP PROBLEM - on cpap                                                                                                                                  .    MEDICAL HISTORY:                                                    Patient Active Problem List    Diagnosis Date Noted     S/P arthroscopy of left knee 08/01/2017     Priority: Medium     Chondromalacia, knee, right 08/01/2017     Priority: Medium     Thrombocytopenia (H) 07/11/2017     Priority: Medium     Mild persistent asthma without complication 06/13/2017     Priority: Medium     Chondromalacia, knee, left 06/13/2017     Priority: Medium     Advanced directives,  counseling/discussion 06/06/2017     Priority: Medium     i gave handouts       Hyperlipidemia LDL goal <130 06/06/2017     Priority: Medium     Other tear of medial meniscus of left knee as current injury, subsequent encounter 01/17/2017     Priority: Medium     Obesity, Class II, BMI 35-39.9, with comorbidity 10/03/2016     Priority: Medium     Gastroesophageal reflux disease without esophagitis 03/08/2016     Priority: Medium     Fatty liver 03/08/2016     Priority: Medium     Allergy to mold spores      Priority: Medium     1/27/15 skin tests pos. for:  cat/dog/DM/M/T/G/W        House dust mite allergy      Priority: Medium     Allergic rhinitis due to animal dander      Priority: Medium     Seasonal allergic conjunctivitis      Priority: Medium     Seasonal allergic rhinitis      Priority: Medium     Diagnostic skin and sensitization tests (aka ALLERGENS)      Priority: Medium     OREN (obstructive sleep apnea) 04/09/2014     Priority: Medium     Shoulder impingement - left 08/09/2013     Priority: Medium     Essential hypertension with goal blood pressure less than 140/90 07/30/2013     Priority: Medium     Mild persistent asthma 02/26/2013     Priority: Medium     CARDIOVASCULAR SCREENING; LDL GOAL LESS THAN 160 10/31/2010     Priority: Medium     Migraine, ophthalmoplegic 08/23/2010     Priority: Medium     BURSITIS, PREPATELLAR - right 08/12/2010     Priority: Medium      Past Medical History:   Diagnosis Date     Allergic rhinitis due to animal dander      Allergy to mold spores     1/27/15 skin tests pos. for:  cat/dog/DM/M/T/G/W      Arthritis      Bursitis     H/O/prepatellar     Diagnostic skin and sensitization tests (aka ALLERGENS) 1/27/15 skin tests pos. for:  cat/dog/DM/M/T/G/W      High cholesterol      House dust mite allergy      Hypertension goal BP (blood pressure) < 140/90 7/30/2013     LBP (low back pain)      Migraines      Obesity      OREN (obstructive sleep apnea)      PONV (postoperative  nausea and vomiting)      Seasonal allergic conjunctivitis      Seasonal allergic rhinitis      Seizure disorder (H)      Thrombocytopenia (H) 7/11/2017     Unspecified asthma(493.90)     takes Flovent inhaler only PRN     Past Surgical History:   Procedure Laterality Date     ABDOMEN SURGERY      LAP BAND     ARTHROSCOPY KNEE Left 7/21/2017    Procedure: ARTHROSCOPY KNEE;  Left knee arthroscopy, meniscectomy, debridement;  Surgeon: Rishabh Logan MD;  Location: [facility] OR     C RAD RESEC TONSIL/PILLARS       COLONOSCOPY  4/1/2013    Procedure: COLONOSCOPY;  COLONOSCOPY SCREEN;  Surgeon: Jameel Patel MD;  Location: [facility] OR     ESOPHAGOSCOPY, GASTROSCOPY, DUODENOSCOPY (EGD), COMBINED N/A 8/21/2014    Procedure: COMBINED ESOPHAGOSCOPY, GASTROSCOPY, DUODENOSCOPY (EGD), BIOPSY SINGLE OR MULTIPLE;  Surgeon: Jameel Patel MD;  Location: [facility] OR     HC PARTIAL REMOVAL, CLAVICLE  8/20/2004    Left shoulder Jose [address] Dr Logan     LAPAROSCOPIC CHOLECYSTECTOMY N/A 9/10/2014    Procedure: LAPAROSCOPIC CHOLECYSTECTOMY;  Surgeon: Jameel Patel MD;  Location: [facility] OR     LASIK  2001     Current Outpatient Prescriptions   Medication Sig Dispense Refill     order for DME CPAP supplies:  Mask, hose, tank, filters 1 each 0     amLODIPine (NORVASC) 10 MG tablet TAKE 1 TABLET BY MOUTH FOR BLOOD PRESSURE IN THE PM 90 tablet 3     lisinopril (PRINIVIL/ZESTRIL) 10 MG tablet TAKE 1 TABLET BY MOUTH IN THE MORNING 90 tablet 3     HYDROcodone-acetaminophen (NORCO) 5-325 MG per tablet Take 1-2 tablets by mouth every 4 hours as needed for other (Moderate to Severe Pain) 30 tablet 0     budesonide (PULMICORT FLEXHALER) 180 MCG/ACT inhaler Inhale 1 puff into the lungs 2 times daily Replaces flovent for insurance 3 Inhaler 3     fluticasone (FLOVENT HFA) 220 MCG/ACT Inhaler Inhale 1 puff into the lungs 2 times daily for asthma prevention. 1 Inhaler 5     simvastatin (ZOCOR) 10 MG tablet Take 1 tablet (10 mg) by  "mouth At Bedtime for cholesterol Pharmacy ok to hold prescription until due 90 tablet 3     albuterol (ALBUTEROL) 108 (90 BASE) MCG/ACT inhaler Inhale 2 puffs into the lungs every 6 hours as needed for shortness of breath / dyspnea or wheezing 2 Inhaler 1     Hypertonic Nasal Wash (SINUS RINSE KIT NA)        OTC products: None, except as noted above    No Known Allergies   Latex Allergy: NO    Social History   Substance Use Topics     Smoking status: Never Smoker     Smokeless tobacco: Never Used      Comment: non smoking household     Alcohol use Yes      Comment: 10-15 drinks a month     History   Drug Use No       REVIEW OF SYSTEMS:                                                    C: NEGATIVE for fever, chills, change in weight  INTEGUMENTARY/SKIN: NEGATIVE for worrisome rashes, moles or lesions  EYES: NEGATIVE for vision changes or irritation  E/M: NEGATIVE for ear, mouth and throat problems  R: NEGATIVE for significant cough or SOB  CV: NEGATIVE for chest pain, palpitations or peripheral edema  GI: NEGATIVE for nausea, abdominal pain, heartburn, or change in bowel habits. No black or bloody stools  : negative for dysuria, hematuria, decreased urinary stream, erectile dysfunction  MUSCULOSKELETAL: NEGATIVE for significant arthralgias or myalgia  NEURO: NEGATIVE for weakness, dizziness or paresthesias  ENDOCRINE: NEGATIVE for temperature intolerance, skin/hair changes  HEME/ALLERGY/IMMUNE: NEGATIVE for bleeding problems  PSYCHIATRIC: NEGATIVE for changes in mood or affect    EXAM:                                                    There were no vitals taken for this visit.   /64  Pulse 80  Temp 98.1  F (36.7  C) (Oral)  Ht 5' 3\" (1.6 m)  Wt 203 lb (92.1 kg)  SpO2 98%  BMI 35.96 kg/m2    GENERAL APPEARANCE: healthy, alert and no distress  HENT: ear canals and TM's normal and nose and mouth without ulcers or lesions  NECK: no adenopathy, no asymmetry, masses, or scars and thyroid normal to " palpation  RESP: lungs clear to auscultation - no rales, rhonchi or wheezes  CV: regular rate and rhythm, normal S1 S2, no S3 or S4 and no murmur, click or rub   ABDOMEN: soft, nontender, no HSM or masses and bowel sounds normal  MS: extremities normal- no gross deformities noted  SKIN: no suspicious lesions or rashes  NEURO: Normal strength and tone, sensory exam grossly normal, mentation intact and speech normal  PSYCH: mentation appears normal and affect normal/bright    DIAGNOSTICS:                                                    EKG: appears normal, NSR, normal axis, normal intervals, no acute ST/T changes c/w ischemia, no LVH by voltage criteria, unchanged from previous tracings  (from 7/11/17)   No new ekg or labs. Recent labs ok.   Recent Labs   Lab Test  07/11/17   1556  05/22/17   0653  09/27/16   0711   HGB  15.3   --   15.5   PLT  116*   --   109*   NA   --   140  138   POTASSIUM  3.7  3.9  3.8   CR   --   0.77  0.86        IMPRESSION:                                                    Reason for surgery/procedure: knee pain// arthroscopy repair  Diagnosis/reason for consult: htn/high cholesterol/asthma/thrombocytopenia// fitness for surgery  Ok for low risk procedure. Did well last month with other knee. Blood pressure ok. Denies chest pain or shortness of breath.   Recheck in 6 months for med/labs recheck. Call/email with questions/concerns  The proposed surgical procedure is considered LOW risk.    REVISED CARDIAC RISK INDEX  The patient has the following serious cardiovascular risks for perioperative complications such as (MI, PE, VFib and 3  AV Block):  No serious cardiac risks  INTERPRETATION: 1 risks: Class II (low risk - 0.9% complication rate)    The patient has the following additional risks for perioperative complications:  No identified additional risks    No diagnosis found.        --Patient is to take all scheduled medications on the day of surgery EXCEPT for modifications listed  below.    APPROVAL GIVEN to proceed with proposed procedure, without further diagnostic evaluation       Signed Electronically by: Martin Howe MD    Copy of this evaluation report is provided to requesting physician.    [place] Preop Guidelines

## 2017-09-01 ENCOUNTER — HOSPITAL ENCOUNTER (OUTPATIENT)
Facility: AMBULATORY SURGERY CENTER | Age: 57
Discharge: HOME | End: 2017-09-01
Attending: ORTHOPAEDIC SURGERY | Admitting: ORTHOPAEDIC SURGERY
Payer: COMMERCIAL

## 2017-09-01 ENCOUNTER — ANESTHESIA (OUTPATIENT)
Dept: SURGERY | Facility: AMBULATORY SURGERY CENTER | Age: 57
End: 2017-09-01

## 2017-09-01 ENCOUNTER — SURGERY (OUTPATIENT)
Age: 57
End: 2017-09-01

## 2017-09-01 VITALS
SYSTOLIC BLOOD PRESSURE: 140 MMHG | TEMPERATURE: 97.5 F | OXYGEN SATURATION: 95 % | DIASTOLIC BLOOD PRESSURE: 96 MMHG | RESPIRATION RATE: 11 BRPM

## 2017-09-01 DIAGNOSIS — M94.261 CHONDROMALACIA, KNEE, RIGHT: Primary | ICD-10-CM

## 2017-09-01 PROCEDURE — 29877 ARTHRS KNEE SURG DBRDMT/SHVG: CPT | Mod: RT | Performed by: ORTHOPAEDIC SURGERY

## 2017-09-01 PROCEDURE — 29877 ARTHRS KNEE SURG DBRDMT/SHVG: CPT | Mod: RT

## 2017-09-01 PROCEDURE — G8907 PT DOC NO EVENTS ON DISCHARG: HCPCS

## 2017-09-01 PROCEDURE — G8916 PT W IV AB GIVEN ON TIME: HCPCS

## 2017-09-01 RX ORDER — ONDANSETRON 2 MG/ML
4 INJECTION INTRAMUSCULAR; INTRAVENOUS EVERY 30 MIN PRN
Status: DISCONTINUED | OUTPATIENT
Start: 2017-09-01 | End: 2017-09-02 | Stop reason: HOSPADM

## 2017-09-01 RX ORDER — DEXAMETHASONE SODIUM PHOSPHATE 4 MG/ML
INJECTION, SOLUTION INTRA-ARTICULAR; INTRALESIONAL; INTRAMUSCULAR; INTRAVENOUS; SOFT TISSUE PRN
Status: DISCONTINUED | OUTPATIENT
Start: 2017-09-01 | End: 2017-09-01

## 2017-09-01 RX ORDER — BUPIVACAINE HYDROCHLORIDE AND EPINEPHRINE 2.5; 5 MG/ML; UG/ML
INJECTION, SOLUTION INFILTRATION; PERINEURAL PRN
Status: DISCONTINUED | OUTPATIENT
Start: 2017-09-01 | End: 2017-09-01 | Stop reason: HOSPADM

## 2017-09-01 RX ORDER — FENTANYL CITRATE 50 UG/ML
25-50 INJECTION, SOLUTION INTRAMUSCULAR; INTRAVENOUS
Status: DISCONTINUED | OUTPATIENT
Start: 2017-09-01 | End: 2017-09-02 | Stop reason: HOSPADM

## 2017-09-01 RX ORDER — HYDROMORPHONE HYDROCHLORIDE 1 MG/ML
.3-.5 INJECTION, SOLUTION INTRAMUSCULAR; INTRAVENOUS; SUBCUTANEOUS EVERY 10 MIN PRN
Status: DISCONTINUED | OUTPATIENT
Start: 2017-09-01 | End: 2017-09-02 | Stop reason: HOSPADM

## 2017-09-01 RX ORDER — KETOROLAC TROMETHAMINE 30 MG/ML
INJECTION, SOLUTION INTRAMUSCULAR; INTRAVENOUS PRN
Status: DISCONTINUED | OUTPATIENT
Start: 2017-09-01 | End: 2017-09-01

## 2017-09-01 RX ORDER — FENTANYL CITRATE 50 UG/ML
INJECTION, SOLUTION INTRAMUSCULAR; INTRAVENOUS PRN
Status: DISCONTINUED | OUTPATIENT
Start: 2017-09-01 | End: 2017-09-01

## 2017-09-01 RX ORDER — OXYCODONE HYDROCHLORIDE 5 MG/1
5 TABLET ORAL ONCE
Status: COMPLETED | OUTPATIENT
Start: 2017-09-01 | End: 2017-09-01

## 2017-09-01 RX ORDER — LIDOCAINE 40 MG/G
CREAM TOPICAL
Status: DISCONTINUED | OUTPATIENT
Start: 2017-09-01 | End: 2017-09-02 | Stop reason: HOSPADM

## 2017-09-01 RX ORDER — CHLORHEXIDINE GLUCONATE 40 MG/ML
SOLUTION TOPICAL ONCE
Status: DISCONTINUED | OUTPATIENT
Start: 2017-09-01 | End: 2017-09-02 | Stop reason: HOSPADM

## 2017-09-01 RX ORDER — NALOXONE HYDROCHLORIDE 0.4 MG/ML
.1-.4 INJECTION, SOLUTION INTRAMUSCULAR; INTRAVENOUS; SUBCUTANEOUS
Status: DISCONTINUED | OUTPATIENT
Start: 2017-09-01 | End: 2017-09-02 | Stop reason: HOSPADM

## 2017-09-01 RX ORDER — CEFAZOLIN SODIUM 2 G/100ML
2 INJECTION, SOLUTION INTRAVENOUS
Status: COMPLETED | OUTPATIENT
Start: 2017-09-01 | End: 2017-09-01

## 2017-09-01 RX ORDER — GABAPENTIN 300 MG/1
300 CAPSULE ORAL ONCE
Status: COMPLETED | OUTPATIENT
Start: 2017-09-01 | End: 2017-09-01

## 2017-09-01 RX ORDER — PROPOFOL 10 MG/ML
INJECTION, EMULSION INTRAVENOUS CONTINUOUS PRN
Status: DISCONTINUED | OUTPATIENT
Start: 2017-09-01 | End: 2017-09-01

## 2017-09-01 RX ORDER — SODIUM CHLORIDE, SODIUM LACTATE, POTASSIUM CHLORIDE, CALCIUM CHLORIDE 600; 310; 30; 20 MG/100ML; MG/100ML; MG/100ML; MG/100ML
INJECTION, SOLUTION INTRAVENOUS CONTINUOUS
Status: DISCONTINUED | OUTPATIENT
Start: 2017-09-01 | End: 2017-09-02 | Stop reason: HOSPADM

## 2017-09-01 RX ORDER — HYDROCODONE BITARTRATE AND ACETAMINOPHEN 5; 325 MG/1; MG/1
1-2 TABLET ORAL EVERY 4 HOURS PRN
Qty: 30 TABLET | Refills: 0 | Status: SHIPPED | OUTPATIENT
Start: 2017-09-01 | End: 2017-09-07

## 2017-09-01 RX ORDER — MEPERIDINE HYDROCHLORIDE 25 MG/ML
12.5 INJECTION INTRAMUSCULAR; INTRAVENOUS; SUBCUTANEOUS
Status: DISCONTINUED | OUTPATIENT
Start: 2017-09-01 | End: 2017-09-02 | Stop reason: HOSPADM

## 2017-09-01 RX ORDER — PROPOFOL 10 MG/ML
INJECTION, EMULSION INTRAVENOUS PRN
Status: DISCONTINUED | OUTPATIENT
Start: 2017-09-01 | End: 2017-09-01

## 2017-09-01 RX ORDER — ONDANSETRON 2 MG/ML
INJECTION INTRAMUSCULAR; INTRAVENOUS PRN
Status: DISCONTINUED | OUTPATIENT
Start: 2017-09-01 | End: 2017-09-01

## 2017-09-01 RX ORDER — LIDOCAINE HYDROCHLORIDE 20 MG/ML
INJECTION, SOLUTION INFILTRATION; PERINEURAL PRN
Status: DISCONTINUED | OUTPATIENT
Start: 2017-09-01 | End: 2017-09-01

## 2017-09-01 RX ORDER — CEFAZOLIN SODIUM 1 G/3ML
1 INJECTION, POWDER, FOR SOLUTION INTRAMUSCULAR; INTRAVENOUS SEE ADMIN INSTRUCTIONS
Status: DISCONTINUED | OUTPATIENT
Start: 2017-09-01 | End: 2017-09-02 | Stop reason: HOSPADM

## 2017-09-01 RX ORDER — ONDANSETRON 4 MG/1
4 TABLET, ORALLY DISINTEGRATING ORAL EVERY 30 MIN PRN
Status: DISCONTINUED | OUTPATIENT
Start: 2017-09-01 | End: 2017-09-02 | Stop reason: HOSPADM

## 2017-09-01 RX ORDER — ACETAMINOPHEN 325 MG/1
975 TABLET ORAL ONCE
Status: COMPLETED | OUTPATIENT
Start: 2017-09-01 | End: 2017-09-01

## 2017-09-01 RX ADMIN — GABAPENTIN 300 MG: 300 CAPSULE ORAL at 07:40

## 2017-09-01 RX ADMIN — SODIUM CHLORIDE, SODIUM LACTATE, POTASSIUM CHLORIDE, CALCIUM CHLORIDE: 600; 310; 30; 20 INJECTION, SOLUTION INTRAVENOUS at 07:49

## 2017-09-01 RX ADMIN — FENTANYL CITRATE 50 MCG: 50 INJECTION, SOLUTION INTRAMUSCULAR; INTRAVENOUS at 09:10

## 2017-09-01 RX ADMIN — CEFAZOLIN SODIUM 2 G: 2 INJECTION, SOLUTION INTRAVENOUS at 09:07

## 2017-09-01 RX ADMIN — BUPIVACAINE HYDROCHLORIDE AND EPINEPHRINE 30 ML: 2.5; 5 INJECTION, SOLUTION INFILTRATION; PERINEURAL at 09:55

## 2017-09-01 RX ADMIN — LIDOCAINE HYDROCHLORIDE 40 MG: 20 INJECTION, SOLUTION INFILTRATION; PERINEURAL at 09:10

## 2017-09-01 RX ADMIN — DEXAMETHASONE SODIUM PHOSPHATE 4 MG: 4 INJECTION, SOLUTION INTRA-ARTICULAR; INTRALESIONAL; INTRAMUSCULAR; INTRAVENOUS; SOFT TISSUE at 09:21

## 2017-09-01 RX ADMIN — FENTANYL CITRATE 50 MCG: 50 INJECTION, SOLUTION INTRAMUSCULAR; INTRAVENOUS at 09:27

## 2017-09-01 RX ADMIN — PROPOFOL 200 MCG/KG/MIN: 10 INJECTION, EMULSION INTRAVENOUS at 09:10

## 2017-09-01 RX ADMIN — OXYCODONE HYDROCHLORIDE 5 MG: 5 TABLET ORAL at 10:40

## 2017-09-01 RX ADMIN — PROPOFOL 200 MG: 10 INJECTION, EMULSION INTRAVENOUS at 09:10

## 2017-09-01 RX ADMIN — KETOROLAC TROMETHAMINE 30 MG: 30 INJECTION, SOLUTION INTRAMUSCULAR; INTRAVENOUS at 09:51

## 2017-09-01 RX ADMIN — ACETAMINOPHEN 975 MG: 325 TABLET ORAL at 07:40

## 2017-09-01 RX ADMIN — ONDANSETRON 4 MG: 2 INJECTION INTRAMUSCULAR; INTRAVENOUS at 09:49

## 2017-09-01 NOTE — BRIEF OP NOTE
PROCEDURE NOTE:    Pre-operative diagnosis: Right knee chondromalacia patella, possible medial meniscus tear   Post-operative diagnosis: Right knee chondromalacia patella, trochlea and medial femur   Procedure: Procedure(s):  Right knee arthroscopy with chondroplasty   Surgeon: Rishabh Logan MD   Assistant(s): Royce Domínguez PA-C   Estimated blood loss: Minimal   Specimens: None   Findings: Right knee chondromalacia patella, trochlea and medial femur   Anesthesia: General endotracheal anesthesia   Drains: None   Complications: None   Weight bearing status: Weight bearing as tolerated   Activity: Activity as tolerated  Patient may move about with assist as indicated or with supervision     Royce Domínguez PA-C  Supervising physician: Rishabh Logan MD  Dept. of Orthopedics  [facility]

## 2017-09-01 NOTE — OP NOTE
DATE OF PROCEDURE:  09/01/2017.      PREOPERATIVE DIAGNOSIS:     1.  Right knee chondromalacia.   2.  Possible medial meniscus tear.      POSTOPERATIVE DIAGNOSIS:  Right knee chondromalacia of patella, trochlea, medial femur.      PROCEDURE:  Right knee arthroscopy with chondroplasty of patella, trochlea and medial femur.      HISTORY:  This is a 57-year-old male with right knee pain.  He has failed conservative treatment and has had a good result of left knee arthroscopy and debridement earlier this summer and presents now for right knee arthroscopy and debridement.      SURGICAL FINDINGS:  The patellofemoral joint showed advanced chondromalacia with grade III-IV chondromalacia changes on both the patella and trochlea.  There was normal tracking.  No plica was evident.  Medial joint showed flaking of the far lateral side of the medial condyle at the notch.  There was grade II-III chondromalacia changes over the localized area of the right.  The meniscus was intact.  The majority of the articular cartilage on both the femur and tibia were intact.  Lateral joint was entirely intact.  No chondromalacia changes, no meniscus tears.  Cruciate ligaments were intact.  There was a fair amount of synovium anteriorly, which was debrided in order to gain visualization.      SURGERY:  After smooth general endotracheal anesthesia, the patient's right leg was exsanguinated and tourniquet inflated to 300 mmHg.  The leg was then prepped and draped in sterile fashion.  Pause was performed for patient verification.  Arthroscopy was performed through 3 portals with the above findings.  A chondroplasty was performed of the patella, trochlea and medial femur with motorized shaver.  It is a fairly small debridement on the notch portion of the medial femur, but more extensive chondromalacia shaving on the trochlea and patella.  There were some areas of exposed to raw bone, especially on the patella.  At the conclusion, the knee was  thoroughly irrigated and drained of fluid.  Portals were injected with 0.25% Marcaine and closed with Steri-Strips.  Sterile dressings were applied and the patient was taken to the recovery room in stable condition.      SURGEON:  Rishabh Carver MD.      ASSISTANT:  Richie Domínguez.         RISHABH CARVER MD             D: 2017 10:08   T: 2017 14:00   MT: #[id number]      Name:     CHIKIS LITTLE   MRN:      [id number]        Account:        [id number]   :      1960           Procedure Date: 2017      Document: [id number]

## 2017-09-01 NOTE — IP AVS SNAPSHOT
[facility]    [address] [place] [address]    Phone:  [phone]                                       After Visit Summary   9/1/2017    Cesar Foss    MRN: [id number]           After Visit Summary Signature Page     I have received my discharge instructions, and my questions have been answered. I have discussed any challenges I see with this plan with the nurse or doctor.    ..........................................................................................................................................  Patient/Patient Representative Signature      ..........................................................................................................................................  Patient Representative Print Name and Relationship to Patient    ..................................................               ................................................  Date                                            Time    ..........................................................................................................................................  Reviewed by Signature/Title    ...................................................              ..............................................  Date                                                            Time

## 2017-09-01 NOTE — IP AVS SNAPSHOT
MRN:[id number]                      After Visit Summary   9/1/2017    Cesar Foss    MRN: [id number]           Thank you!     Thank you for choosing [facility] for your care. Our goal is always to provide you with excellent care. Hearing back from our patients is one way we can continue to improve our services. Please take a few minutes to complete the written survey that you may receive in the mail after you visit with us. Thank you!        Patient Information     Date Of Birth          1960        About your hospital stay     You were admitted on:  September 1, 2017 You last received care in the:  [facility]    You were discharged on:  September 1, 2017       Who to Call     For medical emergencies, please call 911.  For non-urgent questions about your medical care, please call your primary care provider or clinic, [phone]  For questions related to your surgery, please call your surgery clinic        Attending Provider     Provider Specialty    Rishabh Logan MD Orthopedics       Primary Care Provider Office Phone # Fax #    Martin Howe [phone]      After Care Instructions     Discharge Instructions       Review outpatient procedure discharge instructions with patient as directed by Provider  You may walk on the leg as soon as you wish.  Bend the knee as soon as you wish.    Most people use crutches for a couple days.  Leave initial dressing for 2 days.  Then you may remove dressing and shower.  Rewrap with ace wrap.  Leave steri-strips in place.  Weight bearing as tolerated.  Apply ice or cold packs intermittently as needed to relieve pain.   Take pain medications as much as needed, but as little as you can get away with.  They do cause constipation.  May use Aleve up to 4 tablets per day or ibuprofen up to 12 tablets per day if desired.  Aspirin - Take 1 tablet (325 mg) by mouth daily for blood thinning for 2-3 weeks.  Most people  take about a week off work.  Return to clinic 1 week after surgery.                  Your next 10 appointments already scheduled     Sep 07, 2017  1:30 PM CDT   Return Visit with Rishabh Logan MD   [facility] [place] ([facility])    [address]  [place] [address]   [phone]              Further instructions from your care team       [facility]  Same-Day Surgery   Adult Discharge Orders & Instructions   For 24 hours after surgery  1. Get plenty of rest.  A responsible adult must stay with you for at least 24 hours after you leave the hospital.   2. Do not drive or use heavy equipment.  If you have weakness or tingling, don't drive or use heavy equipment until this feeling goes away.  3. Do not drink alcohol.  4. Avoid strenuous or risky activities.  Ask for help when climbing stairs.   5. You may feel lightheaded.  IF so, sit for a few minutes before standing.  Have someone help you get up.   6. If you have nausea (feel sick to your stomach): Drink only clear liquids such as apple juice, ginger ale, broth or 7-Up.  Rest may also help.  Be sure to drink enough fluids.  Move to a regular diet as you feel able.  7. You may have a slight fever. Call the doctor if your fever is over 100 F (37.7 C) (taken under the tongue) or lasts longer than 24 hours.  8. You may have a dry mouth, a sore throat, muscle aches or trouble sleeping.  These should go away after 24 hours.  9. Do not make important or legal decisions.   Call your doctor for any of the followin.  Signs of infection (fever, growing tenderness at the surgery site, a large amount of drainage or bleeding, severe pain, foul-smelling drainage, redness, swelling).    2. It has been over 8 to 10 hours since surgery and you are still not able to urinate (pass water).    3.  Headache for over 24 hours.    4.  Numbness, tingling or weakness the day after surgery (if you had spinal anesthesia).  To  contact a doctor, call ____[phone]_______________________       Pending Results     No orders found from 8/30/2017 to 9/2/2017.            Admission Information     Date & Time Provider Department Dept. Phone    9/1/2017 Rishabh Logan MD [facility] [phone]      Your Vitals Were     Blood Pressure Temperature Respirations Pulse Oximetry          125/91 97.5  F (36.4  C) (Temporal) 10 97%        MyChart Information     MyChart gives you secure access to your electronic health record. If you see a primary care provider, you can also send messages to your care team and make appointments. If you have questions, please call your primary care clinic.  If you do not have a primary care provider, please call [phone] and they will assist you.        Care EveryWhere ID     This is your Care EveryWhere ID. This could be used by other organizations to access your [facility] medical records  [id number]        Equal Access to Services     KM MARIN : Hadii aad ku hadasho Somaria gali, waaxda luqadaha, qaybta kaalmada ademary, janie pelaez . [facility] [phone].    ATENCIÓN: Si habla español, tiene a sharp disposición servicios gratuitos de asistencia lingüística. [facility] [phone].    We comply with applicable federal civil rights laws and [place] laws. We do not discriminate on the basis of race, color, national origin, age, disability sex, sexual orientation or gender identity.               Review of your medicines      CONTINUE these medicines which may have CHANGED, or have new prescriptions. If we are uncertain of the size of tablets/capsules you have at home, strength may be listed as something that might have changed.        Dose / Directions    * HYDROcodone-acetaminophen 5-325 MG per tablet   Commonly known as:  NORCO   This may have changed:  Another medication with the same name was added. Make sure you understand how and when to take each.    Used for:  Chondromalacia, knee, left, Other tear of medial meniscus of left knee as current injury, subsequent encounter        Dose:  1-2 tablet   Take 1-2 tablets by mouth every 4 hours as needed for other (Moderate to Severe Pain)   Quantity:  30 tablet   Refills:  0       * HYDROcodone-acetaminophen 5-325 MG per tablet   Commonly known as:  NORCO   This may have changed:  You were already taking a medication with the same name, and this prescription was added. Make sure you understand how and when to take each.   Used for:  Chondromalacia, knee, right        Dose:  1-2 tablet   Take 1-2 tablets by mouth every 4 hours as needed for other (Moderate to Severe Pain)   Quantity:  30 tablet   Refills:  0       * Notice:  This list has 2 medication(s) that are the same as other medications prescribed for you. Read the directions carefully, and ask your doctor or other care provider to review them with you.      CONTINUE these medicines which have NOT CHANGED        Dose / Directions    albuterol 108 (90 BASE) MCG/ACT Inhaler   Commonly known as:  PROAIR HFA   Used for:  Mild persistent asthma without complication        Dose:  2 puff   Inhale 2 puffs into the lungs every 6 hours as needed for shortness of breath / dyspnea or wheezing   Quantity:  2 Inhaler   Refills:  1       amLODIPine 10 MG tablet   Commonly known as:  NORVASC   Used for:  Essential hypertension with goal blood pressure less than 140/90        TAKE 1 TABLET BY MOUTH FOR BLOOD PRESSURE IN THE PM   Quantity:  90 tablet   Refills:  3       budesonide 180 MCG/ACT inhaler   Commonly known as:  PULMICORT FLEXHALER   Used for:  Mild persistent asthma without complication        Dose:  1 puff   Inhale 1 puff into the lungs 2 times daily Replaces flovent for insurance   Quantity:  3 Inhaler   Refills:  3       fluticasone 220 MCG/ACT Inhaler   Commonly known as:  FLOVENT HFA   Used for:  Mild persistent asthma without complication        Dose:  1 puff    Inhale 1 puff into the lungs 2 times daily for asthma prevention.   Quantity:  1 Inhaler   Refills:  5       lisinopril 10 MG tablet   Commonly known as:  PRINIVIL/ZESTRIL   Used for:  Essential hypertension with goal blood pressure less than 140/90        TAKE 1 TABLET BY MOUTH IN THE MORNING   Quantity:  90 tablet   Refills:  3       order for DME   Used for:  OREN (obstructive sleep apnea)        CPAP supplies:  Mask, hose, tank, filters   Quantity:  1 each   Refills:  0       simvastatin 10 MG tablet   Commonly known as:  ZOCOR   Used for:  Hyperlipidemia LDL goal <130        Dose:  10 mg   Take 1 tablet (10 mg) by mouth At Bedtime for cholesterol Pharmacy ok to hold prescription until due   Quantity:  90 tablet   Refills:  3       SINUS RINSE KIT NA   Indication:  uses syringe kit to rinse out PRN   Used for:  Congestion of paranasal sinus        Refills:  0            Where to get your medicines      Some of these will need a paper prescription and others can be bought over the counter. Ask your nurse if you have questions.     Bring a paper prescription for each of these medications     HYDROcodone-acetaminophen 5-325 MG per tablet                Protect others around you: Learn how to safely use, store and throw away your medicines at www.disposemymeds.org.             Medication List: This is a list of all your medications and when to take them. Check marks below indicate your daily home schedule. Keep this list as a reference.      Medications           Morning Afternoon Evening Bedtime As Needed    albuterol 108 (90 BASE) MCG/ACT Inhaler   Commonly known as:  PROAIR HFA   Inhale 2 puffs into the lungs every 6 hours as needed for shortness of breath / dyspnea or wheezing                                amLODIPine 10 MG tablet   Commonly known as:  NORVASC   TAKE 1 TABLET BY MOUTH FOR BLOOD PRESSURE IN THE PM                                budesonide 180 MCG/ACT inhaler   Commonly known as:  PULMICORT  FLEXHALER   Inhale 1 puff into the lungs 2 times daily Replaces flovent for insurance                                fluticasone 220 MCG/ACT Inhaler   Commonly known as:  FLOVENT HFA   Inhale 1 puff into the lungs 2 times daily for asthma prevention.                                * HYDROcodone-acetaminophen 5-325 MG per tablet   Commonly known as:  NORCO   Take 1-2 tablets by mouth every 4 hours as needed for other (Moderate to Severe Pain)                                * HYDROcodone-acetaminophen 5-325 MG per tablet   Commonly known as:  NORCO   Take 1-2 tablets by mouth every 4 hours as needed for other (Moderate to Severe Pain)                                lisinopril 10 MG tablet   Commonly known as:  PRINIVIL/ZESTRIL   TAKE 1 TABLET BY MOUTH IN THE MORNING                                order for DME   CPAP supplies:  Mask, hose, tank, filters                                simvastatin 10 MG tablet   Commonly known as:  ZOCOR   Take 1 tablet (10 mg) by mouth At Bedtime for cholesterol Pharmacy ok to hold prescription until due                                SINUS RINSE KIT NA                                * Notice:  This list has 2 medication(s) that are the same as other medications prescribed for you. Read the directions carefully, and ask your doctor or other care provider to review them with you.

## 2017-09-01 NOTE — DISCHARGE INSTRUCTIONS
[facility]  Same-Day Surgery   Adult Discharge Orders & Instructions   For 24 hours after surgery  1. Get plenty of rest.  A responsible adult must stay with you for at least 24 hours after you leave the hospital.   2. Do not drive or use heavy equipment.  If you have weakness or tingling, don't drive or use heavy equipment until this feeling goes away.  3. Do not drink alcohol.  4. Avoid strenuous or risky activities.  Ask for help when climbing stairs.   5. You may feel lightheaded.  IF so, sit for a few minutes before standing.  Have someone help you get up.   6. If you have nausea (feel sick to your stomach): Drink only clear liquids such as apple juice, ginger ale, broth or 7-Up.  Rest may also help.  Be sure to drink enough fluids.  Move to a regular diet as you feel able.  7. You may have a slight fever. Call the doctor if your fever is over 100 F (37.7 C) (taken under the tongue) or lasts longer than 24 hours.  8. You may have a dry mouth, a sore throat, muscle aches or trouble sleeping.  These should go away after 24 hours.  9. Do not make important or legal decisions.   Call your doctor for any of the followin.  Signs of infection (fever, growing tenderness at the surgery site, a large amount of drainage or bleeding, severe pain, foul-smelling drainage, redness, swelling).    2. It has been over 8 to 10 hours since surgery and you are still not able to urinate (pass water).    3.  Headache for over 24 hours.    4.  Numbness, tingling or weakness the day after surgery (if you had spinal anesthesia).  To contact a doctor, call ____[phone]_______________________

## 2017-09-01 NOTE — ANESTHESIA CARE TRANSFER NOTE
Patient: Cesar A Fjerstad    Procedure(s):  Right knee arthroscopy, Chrondroplasty - Wound Class: I-Clean    Diagnosis: Right knee chondromalacia patella, possible medial meniscus tear  Diagnosis Additional Information: No value filed.    Anesthesia Type:   General, LMA     Note:  Airway :Room Air  Patient transferred to:PACU  Comments: Prior to atraumatic LMA removal, patient awake, good aeration, SpO2 97%, equal bilateral breath sounds.  Transported to PACU on room air.  SpO2 97% in PACU. No apparent anesthesia complications.         Vitals: (Last set prior to Anesthesia Care Transfer)    CRNA VITALS  9/1/2017 0933 - 9/1/2017 1009      9/1/2017             Pulse: 94    SpO2: 97 %                Electronically Signed By: MANDA Nina CRNA  September 1, 2017  10:09 AM

## 2017-09-01 NOTE — ANESTHESIA POSTPROCEDURE EVALUATION
Patient: Cesar ROSE Fjerstamarilyn    Procedure(s):  Right knee arthroscopy, Chrondroplasty - Wound Class: I-Clean    Diagnosis:Right knee chondromalacia patella, possible medial meniscus tear  Diagnosis Additional Information: No value filed.    Anesthesia Type:  General, LMA    Note:  Anesthesia Post Evaluation    Patient location during evaluation: PACU  Patient participation: Able to fully participate in evaluation  Level of consciousness: awake and alert  Pain management: adequate  Airway patency: patent  Cardiovascular status: acceptable  Respiratory status: acceptable  Hydration status: acceptable  PONV: none     Anesthetic complications: None    Comments: Looks great immediately post op. Awake, joking around, no complaints.         Last vitals:  Vitals:    09/01/17 0700   BP: 151/86   Resp: 18   Temp: 97.9  F (36.6  C)   SpO2: 96%         Electronically Signed By: Jozef Pringle MD  September 1, 2017  10:08 AM

## 2017-09-07 ENCOUNTER — OFFICE VISIT (OUTPATIENT)
Dept: ORTHOPEDICS | Facility: CLINIC | Age: 57
End: 2017-09-07
Payer: COMMERCIAL

## 2017-09-07 VITALS — HEIGHT: 63 IN | TEMPERATURE: 98.6 F | RESPIRATION RATE: 18 BRPM | BODY MASS INDEX: 35.44 KG/M2 | WEIGHT: 200 LBS

## 2017-09-07 DIAGNOSIS — M94.261 CHONDROMALACIA, KNEE, RIGHT: ICD-10-CM

## 2017-09-07 DIAGNOSIS — J45.30 MILD PERSISTENT ASTHMA WITHOUT COMPLICATION: ICD-10-CM

## 2017-09-07 PROCEDURE — 99024 POSTOP FOLLOW-UP VISIT: CPT | Performed by: ORTHOPAEDIC SURGERY

## 2017-09-07 RX ORDER — HYDROCODONE BITARTRATE AND ACETAMINOPHEN 5; 325 MG/1; MG/1
1-2 TABLET ORAL EVERY 4 HOURS PRN
Qty: 30 TABLET | Refills: 0 | Status: SHIPPED | OUTPATIENT
Start: 2017-09-07 | End: 2017-12-01

## 2017-09-07 NOTE — MR AVS SNAPSHOT
After Visit Summary   9/7/2017    Ceasr Foss    MRN: [id number]           Patient Information     Date Of Birth          1960        Visit Information        Provider Department      9/7/2017 1:30 PM Rishabh Logan MD [facility] Instructions    After arthroscopy we want to get range of motion and strength back.  Riding a bicycle at low resistance is helpful for motion and strength.  Tubigrip or ace wrap will help decrease swelling.  .   Standing hamstring stretch: Put the heel of the leg on your injured side on a stool about 15 inches high. Keep your leg straight. Lean forward, bending at the hips, until you feel a mild stretch in the back of your thigh. Make sure you don't roll your shoulders or bend at the waist when doing this or you will stretch your lower back instead of your leg. Hold the stretch for 15 to 30 seconds. Repeat 3 times.   Quadriceps stretch: Stand an arm's length away from the wall with your injured leg farthest from the wall. Facing straight ahead, brace yourself by keeping one hand against the wall. With your other hand, grasp the ankle of your injured leg and pull your heel toward your buttocks. Don't arch or twist your back. Keep your knees together. Hold this stretch for 15 to 30 seconds.   Side-lying leg lift: Lie on your uninjured side. Tighten the front thigh muscles on your injured leg and lift that leg 8 to 10 inches away from the other leg. Keep the leg straight and lower it slowly. Do 3 sets of 10.   Quad sets: Sit on the floor with your injured leg straight and your other leg bent. Press the back of the knee of your injured leg against the floor by tightening the muscles on the top of your thigh. Hold this position 10 seconds. Relax. Do 3 sets of 10.   Straight leg raise: Lie on your back with your legs straight out in front of you. Bend the knee on your uninjured side and place the foot flat on the floor. Tighten the thigh  muscle on your injured side and lift your leg about 8 inches off the floor. Keep your leg straight and your thigh muscle tight. Slowly lower your leg back down to the floor. Do 3 sets of 10.   Step-up: Stand with the foot of your injured leg on a support 3 to 5 inches high (like a small step or block of wood). Keep your other foot flat on the floor. Shift your weight onto the injured leg on the support. Straighten your injured leg as the other leg comes off the floor. Return to the starting position by bending your injured leg and slowly lowering your uninjured leg back to the floor. Do 3 sets of 10.   Wall squat with a ball: Stand with your back, shoulders, and head against a wall. Look straight ahead. Keep your shoulders relaxed and your feet 3 feet from the wall and shoulder's width apart. Place a soccer or basketball-sized ball behind your back. Keeping your back against the wall, slowly squat down to a 45-degree angle. Your thighs will not yet be parallel to the floor. Hold this position for 10 seconds and then slowly slide back up the wall. Repeat 10 times. Build up to 3 sets of 10.   Knee stabilization: Wrap a piece of elastic tubing around the ankle of the uninjured leg. Tie a knot in the other end of the tubing and close it in a door.   0. Stand facing the door on the leg without tubing and bend your knee slightly, keeping your thigh muscles tight. While maintaining this position, move the leg with the tubing straight back behind you. Do 3 sets of 10.   0. Turn 90 degrees so the leg without tubing is closest to the door. Move the leg with tubing away from your body. Do 3 sets of 10.   0. Turn 90 degrees again so your back is to the door. Move the leg with tubing straight out in front of you. Do 3 sets of 10.   0. Turn your body 90 degrees again so the leg with tubing is closest to the door. Move the leg with tubing across your body. Do 3 sets of 10.   Hold onto a chair if you need help balancing. This  exercise can be made even more challenging by standing on a pillow while you move the leg with tubing.   Resisted terminal knee extension: Make a loop from a piece of elastic tubing by tying a knot in both ends. Close both knots in a door. Step into the loop so the tubing is around the back of your injured leg. Lift the other foot off the ground. Hold onto a chair for balance, if needed. Bend the knee on the leg with tubing about 45 degrees. Slowly straighten your leg, keeping your thigh muscle tight as you do this. Do this 10 times. Do 3 sets. An easier way to do this is to stand on both legs for better support while you do the exercise.   Standing calf stretch: Stand facing a wall with your hands on the wall at about eye level. Keep your injured leg back with your heel on the floor. Keep the other leg forward with the knee bent. Turn your back foot slightly inward (as if you were pigeon-toed). Slowly lean into the wall until you feel a stretch in the back of your calf. Hold the stretch for 15 to 30 seconds. Return to the starting position. Repeat 3 times. Do this exercise several times each day.   Clam exercise: Lie on your uninjured side with your hips and knees bent and feet together. Slowly raise your top leg toward the ceiling while keeping your heels touching each other. Hold for 2 seconds and lower slowly. Do 3 sets of 10 repetitions.   Iliotibial band stretch: Side-bending: Cross one leg in front of the other leg and lean in the opposite direction from the front leg. Reach the arm on the side of the back leg over your head while you do this. Hold this position for 15 to 30 seconds. Return to the starting position. Repeat 3 times and then switch legs and repeat the exercise.   Published by [employer].  This content is reviewed periodically and is subject to change as new health information becomes available. The information is intended to inform and educate and is not a replacement for medical evaluation,  "advice, diagnosis or treatment by a healthcare professional.   Written by Cari Fuller, MS, PT, and Mary Nick, PT, [facility], [facility] [place], for [facility].   ? 2010 [facility] and/or its affiliates. All Rights Reserved.   Copyright   Clinical Reference Systems 2011  Adult Health Advisor                               Follow-ups after your visit        Who to contact     If you have questions or need follow up information about today's clinic visit or your schedule please contact [facility] [place] [facility]\"" directly at [phone].  Normal or non-critical lab and imaging results will be communicated to you by MyChart, letter or phone within 4 business days after the clinic has received the results. If you do not hear from us within 7 days, please contact the clinic through [employer] or phone. If you have a critical or abnormal lab result, we will notify you by phone as soon as possible.  Submit refill requests through [employer] or call your pharmacy and they will forward the refill request to us. Please allow 3 business days for your refill to be completed.          Additional Information About Your Visit        [employer] Information     [employer] gives you secure access to your electronic health record. If you see a primary care provider, you can also send messages to your care team and make appointments. If you have questions, please call your primary care clinic.  If you do not have a primary care provider, please call [phone] and they will assist you.        Care EveryWhere ID     This is your Care EveryWhere ID. This could be used by other organizations to access your [place] medical records  [id number]        Your Vitals Were     Temperature Respirations Height BMI (Body Mass Index)          98.6  F (37  C) 18 1.6 m (5' 3\") 35.43 kg/m2         Blood Pressure from Last 3 Encounters:   09/01/17 (!) 140/96   08/29/17 120/64   07/21/17 143/83    Weight from Last 3 Encounters:   09/07/17 90.7 kg (200 lb)   08/29/17 92.1 kg " (203 lb)   08/01/17 90.7 kg (200 lb)              Today, you had the following     No orders found for display       Primary Care Provider Office Phone # Fax #    Martin Howe [phone] [address] [facility] [address]        Equal Access to Services     [facility] TANIA : Hadii aad ku hadasho Soomaali, waaxda luqadaha, qaybta kaalmada adeegyada, waxay idiin hayaan adeeg nitish ladeanna . So [facility] [phone].    ATENCIÓN: Si habla español, tiene a sharp disposición servicios gratuitos de asistencia lingüística. [facility] [phone].    We comply with applicable federal civil rights laws and [place] laws. We do not discriminate on the basis of race, color, national origin, age, disability sex, sexual orientation or gender identity.            Thank you!     Thank you for choosing [facility]  for your care. Our goal is always to provide you with excellent care. Hearing back from our patients is one way we can continue to improve our services. Please take a few minutes to complete the written survey that you may receive in the mail after your visit with us. Thank you!             Your Updated Medication List - Protect others around you: Learn how to safely use, store and throw away your medicines at www.disposemymeds.org.          This list is accurate as of: 9/7/17  1:51 PM.  Always use your most recent med list.                   Brand Name Dispense Instructions for use Diagnosis    albuterol 108 (90 BASE) MCG/ACT Inhaler    PROAIR HFA    2 Inhaler    Inhale 2 puffs into the lungs every 6 hours as needed for shortness of breath / dyspnea or wheezing    Mild persistent asthma without complication       amLODIPine 10 MG tablet    NORVASC    90 tablet    TAKE 1 TABLET BY MOUTH FOR BLOOD PRESSURE IN THE PM    Essential hypertension with goal blood pressure less than 140/90       budesonide 180 MCG/ACT inhaler    PULMICORT FLEXHALER    3 Inhaler    Inhale 1 puff into the  lungs 2 times daily Replaces flovent for insurance    Mild persistent asthma without complication       fluticasone 220 MCG/ACT Inhaler    FLOVENT HFA    1 Inhaler    Inhale 1 puff into the lungs 2 times daily for asthma prevention.    Mild persistent asthma without complication       * HYDROcodone-acetaminophen 5-325 MG per tablet    NORCO    30 tablet    Take 1-2 tablets by mouth every 4 hours as needed for other (Moderate to Severe Pain)    Chondromalacia, knee, left, Other tear of medial meniscus of left knee as current injury, subsequent encounter       * HYDROcodone-acetaminophen 5-325 MG per tablet    NORCO    30 tablet    Take 1-2 tablets by mouth every 4 hours as needed for other (Moderate to Severe Pain)    Chondromalacia, knee, right       lisinopril 10 MG tablet    PRINIVIL/ZESTRIL    90 tablet    TAKE 1 TABLET BY MOUTH IN THE MORNING    Essential hypertension with goal blood pressure less than 140/90       order for DME     1 each    CPAP supplies:  Mask, hose, tank, filters    OREN (obstructive sleep apnea)       simvastatin 10 MG tablet    ZOCOR    90 tablet    Take 1 tablet (10 mg) by mouth At Bedtime for cholesterol Pharmacy ok to hold prescription until due    Hyperlipidemia LDL goal <130       SINUS RINSE KIT NA       Congestion of paranasal sinus       * Notice:  This list has 2 medication(s) that are the same as other medications prescribed for you. Read the directions carefully, and ask your doctor or other care provider to review them with you.

## 2017-09-07 NOTE — NURSING NOTE
"Chief Complaint   Patient presents with     RECHECK     Left knee scope on 7/21/17.       Initial Temp 98.6  F (37  C)  Resp 18  Ht 1.6 m (5' 3\")  Wt 90.7 kg (200 lb)  BMI 35.43 kg/m2 Estimated body mass index is 35.43 kg/(m^2) as calculated from the following:    Height as of this encounter: 1.6 m (5' 3\").    Weight as of this encounter: 90.7 kg (200 lb).  Medication Reconciliation: complete   Angy Crooks MA      "

## 2017-09-07 NOTE — PATIENT INSTRUCTIONS
After arthroscopy we want to get range of motion and strength back.  Riding a bicycle at low resistance is helpful for motion and strength.  Tubigrip or ace wrap will help decrease swelling.  .   Standing hamstring stretch: Put the heel of the leg on your injured side on a stool about 15 inches high. Keep your leg straight. Lean forward, bending at the hips, until you feel a mild stretch in the back of your thigh. Make sure you don't roll your shoulders or bend at the waist when doing this or you will stretch your lower back instead of your leg. Hold the stretch for 15 to 30 seconds. Repeat 3 times.   Quadriceps stretch: Stand an arm's length away from the wall with your injured leg farthest from the wall. Facing straight ahead, brace yourself by keeping one hand against the wall. With your other hand, grasp the ankle of your injured leg and pull your heel toward your buttocks. Don't arch or twist your back. Keep your knees together. Hold this stretch for 15 to 30 seconds.   Side-lying leg lift: Lie on your uninjured side. Tighten the front thigh muscles on your injured leg and lift that leg 8 to 10 inches away from the other leg. Keep the leg straight and lower it slowly. Do 3 sets of 10.   Quad sets: Sit on the floor with your injured leg straight and your other leg bent. Press the back of the knee of your injured leg against the floor by tightening the muscles on the top of your thigh. Hold this position 10 seconds. Relax. Do 3 sets of 10.   Straight leg raise: Lie on your back with your legs straight out in front of you. Bend the knee on your uninjured side and place the foot flat on the floor. Tighten the thigh muscle on your injured side and lift your leg about 8 inches off the floor. Keep your leg straight and your thigh muscle tight. Slowly lower your leg back down to the floor. Do 3 sets of 10.   Step-up: Stand with the foot of your injured leg on a support 3 to 5 inches high (like a small step or block of  wood). Keep your other foot flat on the floor. Shift your weight onto the injured leg on the support. Straighten your injured leg as the other leg comes off the floor. Return to the starting position by bending your injured leg and slowly lowering your uninjured leg back to the floor. Do 3 sets of 10.   Wall squat with a ball: Stand with your back, shoulders, and head against a wall. Look straight ahead. Keep your shoulders relaxed and your feet 3 feet from the wall and shoulder's width apart. Place a soccer or basketball-sized ball behind your back. Keeping your back against the wall, slowly squat down to a 45-degree angle. Your thighs will not yet be parallel to the floor. Hold this position for 10 seconds and then slowly slide back up the wall. Repeat 10 times. Build up to 3 sets of 10.   Knee stabilization: Wrap a piece of elastic tubing around the ankle of the uninjured leg. Tie a knot in the other end of the tubing and close it in a door.   0. Stand facing the door on the leg without tubing and bend your knee slightly, keeping your thigh muscles tight. While maintaining this position, move the leg with the tubing straight back behind you. Do 3 sets of 10.   0. Turn 90 degrees so the leg without tubing is closest to the door. Move the leg with tubing away from your body. Do 3 sets of 10.   0. Turn 90 degrees again so your back is to the door. Move the leg with tubing straight out in front of you. Do 3 sets of 10.   0. Turn your body 90 degrees again so the leg with tubing is closest to the door. Move the leg with tubing across your body. Do 3 sets of 10.   Hold onto a chair if you need help balancing. This exercise can be made even more challenging by standing on a pillow while you move the leg with tubing.   Resisted terminal knee extension: Make a loop from a piece of elastic tubing by tying a knot in both ends. Close both knots in a door. Step into the loop so the tubing is around the back of your injured  leg. Lift the other foot off the ground. Hold onto a chair for balance, if needed. Bend the knee on the leg with tubing about 45 degrees. Slowly straighten your leg, keeping your thigh muscle tight as you do this. Do this 10 times. Do 3 sets. An easier way to do this is to stand on both legs for better support while you do the exercise.   Standing calf stretch: Stand facing a wall with your hands on the wall at about eye level. Keep your injured leg back with your heel on the floor. Keep the other leg forward with the knee bent. Turn your back foot slightly inward (as if you were pigeon-toed). Slowly lean into the wall until you feel a stretch in the back of your calf. Hold the stretch for 15 to 30 seconds. Return to the starting position. Repeat 3 times. Do this exercise several times each day.   Clam exercise: Lie on your uninjured side with your hips and knees bent and feet together. Slowly raise your top leg toward the ceiling while keeping your heels touching each other. Hold for 2 seconds and lower slowly. Do 3 sets of 10 repetitions.   Iliotibial band stretch: Side-bending: Cross one leg in front of the other leg and lean in the opposite direction from the front leg. Reach the arm on the side of the back leg over your head while you do this. Hold this position for 15 to 30 seconds. Return to the starting position. Repeat 3 times and then switch legs and repeat the exercise.   Published by [employer].  This content is reviewed periodically and is subject to change as new health information becomes available. The information is intended to inform and educate and is not a replacement for medical evaluation, advice, diagnosis or treatment by a healthcare professional.   Written by Cari Fuller, MS, PT, and Mary Nick, PT, [facility], [facility], for [employer].   ? 2010 [facility] [facility] and/or its affiliates. All Rights Reserved.   Copyright   Clinical Reference Systems 2011  Adult Health Advisor

## 2017-09-07 NOTE — PROGRESS NOTES
Follow-up right knee arthroscopy with chondroplasty on 9/1/17.   He has more pain with right knee arthroscopy.  Wounds are healing well.    He has medium effusion.   There is moderate pain.      Assessment:  Right knee arthroscopy with chondroplasty  doing well.  I reviewed the operative findings.  Will start range of motion and strengthening.  Scar massage.  Advance activity as tolerated.  Pain medication refill:  Vicodin.  RTC 3-4 weeks if still painful.

## 2017-09-07 NOTE — LETTER
9/7/2017         RE: Cesar Foss  [address] [address] [id number]        Dear Colleague,    Thank you for referring your patient, Cesar Foss, to the [facility]. Please see a copy of my visit note below.    Follow-up right knee arthroscopy with chondroplasty on 9/1/17.   He has more pain with right knee arthroscopy.  Wounds are healing well.    He has medium effusion.   There is moderate pain.      Assessment:  Right knee arthroscopy with chondroplasty  doing well.  I reviewed the operative findings.  Will start range of motion and strengthening.  Scar massage.  Advance activity as tolerated.  Pain medication refill:  Vicodin.  RTC 3-4 weeks if still painful.          Again, thank you for allowing me to participate in the care of your patient.        Sincerely,        Rishabh Logan MD

## 2017-09-11 RX ORDER — ALBUTEROL SULFATE 90 UG/1
AEROSOL, METERED RESPIRATORY (INHALATION)
Qty: 17 INHALER | Refills: 1 | Status: SHIPPED | OUTPATIENT
Start: 2017-09-11 | End: 2017-09-11

## 2017-09-11 RX ORDER — ALBUTEROL SULFATE 90 UG/1
AEROSOL, METERED RESPIRATORY (INHALATION)
Qty: 17 INHALER | Refills: 1 | Status: SHIPPED | OUTPATIENT
Start: 2017-09-11 | End: 2019-01-29

## 2017-11-08 DIAGNOSIS — E78.5 HYPERLIPIDEMIA LDL GOAL <130: ICD-10-CM

## 2017-11-09 RX ORDER — SIMVASTATIN 10 MG
10 TABLET ORAL AT BEDTIME
Qty: 30 TABLET | Refills: 0 | Status: SHIPPED | OUTPATIENT
Start: 2017-11-09 | End: 2017-12-01

## 2017-11-13 ENCOUNTER — DOCUMENTATION ONLY (OUTPATIENT)
Dept: LAB | Facility: CLINIC | Age: 57
End: 2017-11-13

## 2017-11-13 DIAGNOSIS — I10 ESSENTIAL HYPERTENSION WITH GOAL BLOOD PRESSURE LESS THAN 140/90: Primary | ICD-10-CM

## 2017-11-13 DIAGNOSIS — E78.5 HYPERLIPIDEMIA LDL GOAL <130: ICD-10-CM

## 2017-11-16 DIAGNOSIS — I10 ESSENTIAL HYPERTENSION WITH GOAL BLOOD PRESSURE LESS THAN 140/90: ICD-10-CM

## 2017-11-16 DIAGNOSIS — E78.5 HYPERLIPIDEMIA LDL GOAL <130: ICD-10-CM

## 2017-11-16 LAB
ALBUMIN SERPL-MCNC: 4.2 G/DL (ref 3.4–5)
ALP SERPL-CCNC: 57 U/L (ref 40–150)
ALT SERPL W P-5'-P-CCNC: 100 U/L (ref 0–70)
ANION GAP SERPL CALCULATED.3IONS-SCNC: 8 MMOL/L (ref 3–14)
AST SERPL W P-5'-P-CCNC: 68 U/L (ref 0–45)
BILIRUB SERPL-MCNC: 0.6 MG/DL (ref 0.2–1.3)
BUN SERPL-MCNC: 12 MG/DL (ref 7–30)
CALCIUM SERPL-MCNC: 9.1 MG/DL (ref 8.5–10.1)
CHLORIDE SERPL-SCNC: 104 MMOL/L (ref 94–109)
CHOLEST SERPL-MCNC: 165 MG/DL
CO2 SERPL-SCNC: 28 MMOL/L (ref 20–32)
CREAT SERPL-MCNC: 0.72 MG/DL (ref 0.66–1.25)
GFR SERPL CREATININE-BSD FRML MDRD: >90 ML/MIN/1.7M2
GLUCOSE SERPL-MCNC: 102 MG/DL (ref 70–99)
HDLC SERPL-MCNC: 43 MG/DL
LDLC SERPL CALC-MCNC: 96 MG/DL
NONHDLC SERPL-MCNC: 122 MG/DL
POTASSIUM SERPL-SCNC: 4 MMOL/L (ref 3.4–5.3)
PROT SERPL-MCNC: 7.5 G/DL (ref 6.8–8.8)
SODIUM SERPL-SCNC: 140 MMOL/L (ref 133–144)
TRIGL SERPL-MCNC: 131 MG/DL

## 2017-11-16 PROCEDURE — 80061 LIPID PANEL: CPT | Performed by: FAMILY MEDICINE

## 2017-11-16 PROCEDURE — 80053 COMPREHEN METABOLIC PANEL: CPT | Performed by: FAMILY MEDICINE

## 2017-11-16 PROCEDURE — 36415 COLL VENOUS BLD VENIPUNCTURE: CPT | Performed by: FAMILY MEDICINE

## 2017-12-01 ENCOUNTER — OFFICE VISIT (OUTPATIENT)
Dept: FAMILY MEDICINE | Facility: CLINIC | Age: 57
End: 2017-12-01
Payer: COMMERCIAL

## 2017-12-01 VITALS
DIASTOLIC BLOOD PRESSURE: 70 MMHG | WEIGHT: 208 LBS | HEART RATE: 86 BPM | SYSTOLIC BLOOD PRESSURE: 100 MMHG | HEIGHT: 63 IN | OXYGEN SATURATION: 96 % | BODY MASS INDEX: 36.86 KG/M2 | TEMPERATURE: 97.7 F

## 2017-12-01 DIAGNOSIS — D69.6 THROMBOCYTOPENIA (H): ICD-10-CM

## 2017-12-01 DIAGNOSIS — Z23 NEED FOR PROPHYLACTIC VACCINATION AND INOCULATION AGAINST INFLUENZA: ICD-10-CM

## 2017-12-01 DIAGNOSIS — K76.0 FATTY LIVER: ICD-10-CM

## 2017-12-01 DIAGNOSIS — J45.30 MILD PERSISTENT ASTHMA WITHOUT COMPLICATION: ICD-10-CM

## 2017-12-01 DIAGNOSIS — I10 ESSENTIAL HYPERTENSION WITH GOAL BLOOD PRESSURE LESS THAN 140/90: ICD-10-CM

## 2017-12-01 DIAGNOSIS — E78.5 HYPERLIPIDEMIA LDL GOAL <130: Primary | ICD-10-CM

## 2017-12-01 PROCEDURE — 99214 OFFICE O/P EST MOD 30 MIN: CPT | Mod: 25 | Performed by: FAMILY MEDICINE

## 2017-12-01 PROCEDURE — 90471 IMMUNIZATION ADMIN: CPT | Performed by: FAMILY MEDICINE

## 2017-12-01 PROCEDURE — 90686 IIV4 VACC NO PRSV 0.5 ML IM: CPT | Performed by: FAMILY MEDICINE

## 2017-12-01 RX ORDER — SIMVASTATIN 10 MG
10 TABLET ORAL AT BEDTIME
Qty: 90 TABLET | Refills: 3 | Status: SHIPPED | OUTPATIENT
Start: 2017-12-01 | End: 2018-12-18

## 2017-12-01 NOTE — NURSING NOTE
"Chief Complaint   Patient presents with     Hypertension     Lipids       Initial /70  Pulse 86  Temp 97.7  F (36.5  C) (Oral)  Ht 5' 3\" (1.6 m)  Wt 208 lb (94.3 kg)  SpO2 96%  BMI 36.85 kg/m2 Estimated body mass index is 36.85 kg/(m^2) as calculated from the following:    Height as of this encounter: 5' 3\" (1.6 m).    Weight as of this encounter: 208 lb (94.3 kg).  Medication Reconciliation: complete   Venus Topete CMA    "

## 2017-12-01 NOTE — MR AVS SNAPSHOT
After Visit Summary   12/1/2017    Cesar Foss    MRN: [id number]           Patient Information     Date Of Birth          1960        Visit Information        Provider Department      12/1/2017 1:20 PM Martin Howe MD [facility]        Today's Diagnoses     Hyperlipidemia LDL goal <130    -  1    Thrombocytopenia (H)        Mild persistent asthma without complication        Fatty liver        Essential hypertension with goal blood pressure less than 140/90        Need for prophylactic vaccination and inoculation against influenza           Follow-ups after your visit        Your next 10 appointments already scheduled     Dec 05, 2017  9:15 AM CST   [facility]    [address] [place] [id number] [phone]              Who to contact     If you have questions or need follow up information about today's clinic visit or your schedule please contact [facility] directly at [phone].  Normal or non-critical lab and imaging results will be communicated to you by SovTechhart, letter or phone within 4 business days after the clinic has received the results. If you do not hear from us within 7 days, please contact the clinic through Avison Youngt or phone. If you have a critical or abnormal lab result, we will notify you by phone as soon as possible.  Submit refill requests through Convertro or call your pharmacy and they will forward the refill request to us. Please allow 3 business days for your refill to be completed.          Additional Information About Your Visit        MyChart Information     Convert[employer] gives you secure access to your electronic health record. If you see a primary care provider, you can also send messages to your care team and make appointments. If you have questions, please call your primary care clinic.  If you do not have a primary  "care provider, please call [phone] and they will assist you.        Care EveryWhere ID     This is your Care EveryWhere ID. This could be used by other organizations to access your [facility] medical records  [id number]        Your Vitals Were     Pulse Temperature Height Pulse Oximetry BMI (Body Mass Index)       86 97.7  F (36.5  C) (Oral) 5' 3\" (1.6 m) 96% 36.85 kg/m2        Blood Pressure from Last 3 Encounters:   12/01/17 100/70   09/01/17 (!) 140/96   08/29/17 120/64    Weight from Last 3 Encounters:   12/01/17 208 lb (94.3 kg)   09/07/17 200 lb (90.7 kg)   08/29/17 203 lb (92.1 kg)              We Performed the Following     Asthma Action Plan (AAP)     FLU VAC, SPLIT VIRUS IM > 3 YO (QUADRIVALENT) [55862]     Vaccine Administration, Initial [54701]          Today's Medication Changes          These changes are accurate as of: 12/1/17  2:12 PM.  If you have any questions, ask your nurse or doctor.               These medicines have changed or have updated prescriptions.        Dose/Directions    simvastatin 10 MG tablet   Commonly known as:  ZOCOR   This may have changed:  additional instructions   Used for:  Hyperlipidemia LDL goal <130   Changed by:  Martin Howe MD        Dose:  10 mg   Take 1 tablet (10 mg) by mouth At Bedtime   Quantity:  90 tablet   Refills:  3         Stop taking these medicines if you haven't already. Please contact your care team if you have questions.     fluticasone 220 MCG/ACT Inhaler   Commonly known as:  FLOVENT HFA   Stopped by:  Martin Howe MD                Where to get your medicines      These medications were sent to [facility]/pharmacy #[id number] - [place] - [address],  AT COR[address] OF [facility]  [address], , [facility] [address]     Phone:  [phone]     simvastatin 10 MG tablet                Primary Care Provider Office Phone # Fax #    Martin Howe [phone] [address] [facility] " [address]        Equal Access to Services     TIFFANIERAÚL TANIA : Hadii aad ku hadtylersilvana Williamali, waserada luqadaha, qaybta kaatiliosusanne pope, janie graham. So [facility] [phone].    ATENCIÓN: Si habla español, tiene a sharp disposición servicios gratuitos de asistencia lingüística. Llame al [phone].    We comply with applicable federal civil rights laws and [place] laws. We do not discriminate on the basis of race, color, national origin, age, disability, sex, sexual orientation, or gender identity.            Thank you!     Thank you for choosing [facility]  for your care. Our goal is always to provide you with excellent care. Hearing back from our patients is one way we can continue to improve our services. Please take a few minutes to complete the written survey that you may receive in the mail after your visit with us. Thank you!             Your Updated Medication List - Protect others around you: Learn how to safely use, store and throw away your medicines at www.disposemymeds.org.          This list is accurate as of: 12/1/17  2:12 PM.  Always use your most recent med list.                   Brand Name Dispense Instructions for use Diagnosis    albuterol 108 (90 BASE) MCG/ACT Inhaler    PROAIR HFA    17 Inhaler    INHALE 2 PUFFS INTO THE LUNGS EVERY 6 HOURS AS NEEDED FOR SHORTNESS OF BREATH / DYSPNEA OR WHEEZING    Mild persistent asthma without complication       amLODIPine 10 MG tablet    NORVASC    90 tablet    TAKE 1 TABLET BY MOUTH FOR BLOOD PRESSURE IN THE PM    Essential hypertension with goal blood pressure less than 140/90       budesonide 180 MCG/ACT inhaler    PULMICORT FLEXHALER    3 Inhaler    Inhale 1 puff into the lungs 2 times daily Replaces flovent for insurance    Mild persistent asthma without complication       lisinopril 10 MG tablet    PRINIVIL/ZESTRIL    90 tablet    TAKE 1 TABLET BY MOUTH IN THE MORNING    Essential hypertension with goal blood  pressure less than 140/90       order for DME     1 each    CPAP supplies:  Mask, hose, tank, filters    OREN (obstructive sleep apnea)       simvastatin 10 MG tablet    ZOCOR    90 tablet    Take 1 tablet (10 mg) by mouth At Bedtime    Hyperlipidemia LDL goal <130       SINUS RINSE KIT NA       Congestion of paranasal sinus

## 2017-12-01 NOTE — PROGRESS NOTES
SUBJECTIVE:  Cesar Foss, a 57 year old male scheduled an appointment to discuss the following issues:  Need for prophylactic vaccination and inoculation against influenza  Follow-up htn, gerd, high cholesterol, fatty liver and asthma.  Knee scope when ok. Seeing ortho again. No chest pain or shortness of breath. Exercise difficult with knees. No urine changes. No nausea, vomiting or diarrhea or black or bloody stools. Emotionally ok. Albuterol worse with this time of year.    Past Medical History:   Diagnosis Date     Allergic rhinitis due to animal dander      Allergy to mold spores     1/27/15 skin tests pos. for:  cat/dog/DM/M/T/G/W      Arthritis      Bursitis     H/O/prepatellar     Diagnostic skin and sensitization tests (aka ALLERGENS) 1/27/15 skin tests pos. for:  cat/dog/DM/M/T/G/W      High cholesterol      House dust mite allergy      Hypertension goal BP (blood pressure) < 140/90 7/30/2013     LBP (low back pain)      Migraines      Obesity      OREN (obstructive sleep apnea)      PONV (postoperative nausea and vomiting)      Seasonal allergic conjunctivitis      Seasonal allergic rhinitis      Seizure disorder (H)      Thrombocytopenia (H) 7/11/2017     Unspecified asthma(493.90)     takes Flovent inhaler only PRN       Past Surgical History:   Procedure Laterality Date     ABDOMEN SURGERY      LAP BAND     ARTHROSCOPY KNEE Left 7/21/2017    Procedure: ARTHROSCOPY KNEE;  Left knee arthroscopy, meniscectomy, debridement;  Surgeon: Rishabh Logan MD;  Location:  OR     ARTHROSCOPY KNEE Right 9/1/2017    Procedure: ARTHROSCOPY KNEE;  Right knee arthroscopy, Chrondroplasty;  Surgeon: Rishabh Logan MD;  Location:  OR     C RAD RESEC TONSIL/PILLARS       COLONOSCOPY  4/1/2013    Procedure: COLONOSCOPY;  COLONOSCOPY SCREEN;  Surgeon: Jameel Patel MD;  Location: [facility] OR     ESOPHAGOSCOPY, GASTROSCOPY, DUODENOSCOPY (EGD), COMBINED N/A 8/21/2014    Procedure: COMBINED  "ESOPHAGOSCOPY, GASTROSCOPY, DUODENOSCOPY (EGD), BIOPSY SINGLE OR MULTIPLE;  Surgeon: Jameel Patel MD;  Location: [facility] OR     HC PARTIAL REMOVAL, CLAVICLE  8/20/2004    Left shoulder Jose [place]- Dr Logan     LAPAROSCOPIC CHOLECYSTECTOMY N/A 9/10/2014    Procedure: LAPAROSCOPIC CHOLECYSTECTOMY;  Surgeon: Jameel Patel MD;  Location: [facility] OR     LASIK  2001       Family History   Problem Relation Age of Onset     Eye Disorder Mother      HEART DISEASE Mother      60yo MI.     Lipids Mother      Macular Degeneration Mother      DIABETES Mother      Hypertension Mother      Lipids Father      CANCER Father      Hypertension Father      Alcohol/Drug Brother      DIABETES Sister      hyperglycemic     Obesity Daughter      Glaucoma No family hx of      CEREBROVASCULAR DISEASE No family hx of      Thyroid Disease No family hx of        Social History   Substance Use Topics     Smoking status: Never Smoker     Smokeless tobacco: Never Used      Comment: non smoking household     Alcohol use Yes      Comment: 10-15 drinks a month       ROS:  All other ROS negative.   OBJECTIVE:  /70  Pulse 86  Temp 97.7  F (36.5  C) (Oral)  Ht 5' 3\" (1.6 m)  Wt 208 lb (94.3 kg)  SpO2 96%  BMI 36.85 kg/m2  EXAM:  GENERAL APPEARANCE: healthy, alert and no distress  NECK: no adenopathy, no asymmetry, masses, or scars and thyroid normal to palpation  RESP: lungs clear to auscultation - no rales, rhonchi or wheezes  CV: regular rates and rhythm, normal S1 S2, no S3 or S4 and no murmur, click or rub -  ABDOMEN:  soft, nontender, no HSM or masses and bowel sounds normal  MS: extremities normal- no gross deformities noted, no evidence of inflammation in joints, FROM in all extremities.  PSYCH: mentation appears normal and affect normal/bright    ASSESSMENT / PLAN:  (Z23) Need for prophylactic vaccination and inoculation against influenza  (primary encounter diagnosis)  Plan: FLU VAC, SPLIT VIRUS IM > 3 YO " (QUADRIVALENT)         [34954], Vaccine Administration, Initial         [23201]            (E78.5) Hyperlipidemia LDL goal <130  Comment: stable  Plan: simvastatin (ZOCOR) 10 MG tablet        Diet/exercise and weight loss    (D69.6) Thrombocytopenia (H)  Comment: stable clincally   Plan: limit ALCOHOL.; return to clinic if bleeding/bruising issues.     (J45.30) Mild persistent asthma without complication  Comment: stable  Plan: continue MDI's    (K76.0) Fatty liver  Comment: stable  Plan: weight lloss    (I10) Essential hypertension with goal blood pressure less than 140/90  Comment: stable  Plan: continue meds. Weight loss. Exercise. Chest pain or shortness of breath to er.     Martin Howe

## 2017-12-01 NOTE — PROGRESS NOTES
blood pressure, chol check    Injectable Influenza Immunization Documentation    1.  Is the person to be vaccinated sick today?   No    2. Does the person to be vaccinated have an allergy to a component   of the vaccine?   No  Egg Allergy Algorithm Link    3. Has the person to be vaccinated ever had a serious reaction   to influenza vaccine in the past?   No    4. Has the person to be vaccinated ever had Guillain-Barré syndrome?   No    Form completed by Venus Topete CMA

## 2017-12-02 ASSESSMENT — ASTHMA QUESTIONNAIRES: ACT_TOTALSCORE: 21

## 2017-12-05 ENCOUNTER — OFFICE VISIT (OUTPATIENT)
Dept: ORTHOPEDICS | Facility: CLINIC | Age: 57
End: 2017-12-05
Payer: COMMERCIAL

## 2017-12-05 VITALS — BODY MASS INDEX: 36.5 KG/M2 | HEIGHT: 63 IN | RESPIRATION RATE: 18 BRPM | TEMPERATURE: 97.5 F | WEIGHT: 206 LBS

## 2017-12-05 DIAGNOSIS — M17.11 PRIMARY OSTEOARTHRITIS OF RIGHT KNEE: Primary | ICD-10-CM

## 2017-12-05 PROCEDURE — 20610 DRAIN/INJ JOINT/BURSA W/O US: CPT | Mod: RT | Performed by: ORTHOPAEDIC SURGERY

## 2017-12-05 RX ORDER — METHYLPREDNISOLONE ACETATE 80 MG/ML
80 INJECTION, SUSPENSION INTRA-ARTICULAR; INTRALESIONAL; INTRAMUSCULAR; SOFT TISSUE ONCE
Qty: 1 ML | Refills: 0 | OUTPATIENT
Start: 2017-12-05 | End: 2017-12-05

## 2017-12-05 NOTE — NURSING NOTE
"Chief Complaint   Patient presents with     RECHECK     Right knee scope on 9/1/17.       Initial Temp 97.5  F (36.4  C)  Resp 18  Ht 1.6 m (5' 3\")  Wt 93.4 kg (206 lb)  BMI 36.49 kg/m2 Estimated body mass index is 36.49 kg/(m^2) as calculated from the following:    Height as of this encounter: 1.6 m (5' 3\").    Weight as of this encounter: 93.4 kg (206 lb).  Medication Reconciliation: complete   Angy Crooks MA      "

## 2017-12-05 NOTE — PROGRESS NOTES
The patient's right knee was prepped with betadine solution after verification of allergies. Area approximately 10 cm x 10 cm prepped in a sterile fashion. After injection, betadine removed with soap and water and band-aids applied.    1ml depo-medrol with 1% lidocaine plain injected into patient's right knee by Dr. Rishabh Logan  LOT# [id number]  Exp. 03/2020    Royce Domínguez PA-C  Supervising physician: Rishabh Logan MD  Dept. of Orthopedics  [facility]

## 2017-12-05 NOTE — PATIENT INSTRUCTIONS
Ibuprofen up to 12 tablets per day.  We could try prescription if this fails.    You have had a steroid injection today.  For the first 2 hours there will likely be some numbing in the joint from the lidocaine.  This is a good sign, indicating that the injection is in the right place.  In 2 hours the lidocaine will wear off, and the joint will hurt like you had a shot.  Each day the cortisone makes it feel better.  It reaches peak effect in 2 weeks.  We expect it to last for 3 months.  You may resume regular activity when you feel ready.  If you are diabetic, your glucoses will be quite high for several days.

## 2017-12-05 NOTE — LETTER
12/5/2017         RE: Cesar Foss  [address] [address] [address]        Dear Colleague,    Thank you for referring your patient, Cesar Foss, to the [facility]. Please see a copy of my visit note below.    The patient's right knee was prepped with betadine solution after verification of allergies. Area approximately 10 cm x 10 cm prepped in a sterile fashion. After injection, betadine removed with soap and water and band-aids applied.    1ml depo-medrol with 1% lidocaine plain injected into patient's right knee by Dr. Rishabh Logan  LOT# [id number]  Exp. 03/2020    Royce Domínguez PA-C  Supervising physician: Rishabh Logan MD  Dept. of Orthopedics  [facility]          Follow-up right knee arthroscopy with chondroplasty on 9/1/17. Had left knee arthroscopy in 7/21/17  He has more pain with right knee arthroscopy.  Wounds are well healed.  He has small prepatellar effusion without tenderness..   There is tenderness of patello-femoral joint.  No tenderness at medial or lateral joint line.  No knee effusion.  No ligament laxity.    Assessment:  Right knee arthroscopy with patello-femoral chondroplasty with persistent pain.  I recommend steroid injection, ibuprofen, strengthening.  He  desires injection today of right knee(s).  Risks, benefits, potential complications and alternatives were discussed.   With the patient's consent, sterile prep was performed of right knee(s).  Right knee was injected with Depo Medrol 80 mg and lidocaine at anteromedial site.  Return to clinic as needed.          Again, thank you for allowing me to participate in the care of your patient.        Sincerely,        Rishabh Logan MD

## 2017-12-05 NOTE — MR AVS SNAPSHOT
After Visit Summary   12/5/2017    Cesar Foss    MRN: [id number]           Patient Information     Date Of Birth          1960        Visit Information        Provider Department      12/5/2017 9:15 AM Rishabh Logan MD [facility]        Care Instructions    Ibuprofen up to 12 tablets per day.  We could try prescription if this fails.    You have had a steroid injection today.  For the first 2 hours there will likely be some numbing in the joint from the lidocaine.  This is a good sign, indicating that the injection is in the right place.  In 2 hours the lidocaine will wear off, and the joint will hurt like you had a shot.  Each day the cortisone makes it feel better.  It reaches peak effect in 2 weeks.  We expect it to last for 3 months.  You may resume regular activity when you feel ready.  If you are diabetic, your glucoses will be quite high for several days.              Follow-ups after your visit        Who to contact     If you have questions or need follow up information about today's clinic visit or your schedule please contact [facility] directly at [phone].  Normal or non-critical lab and imaging results will be communicated to you by [employer] Labshart, letter or phone within 4 business days after the clinic has received the results. If you do not hear from us within 7 days, please contact the clinic through Taggedt or phone. If you have a critical or abnormal lab result, we will notify you by phone as soon as possible.  Submit refill requests through Cinchcast or call your pharmacy and they will forward the refill request to us. Please allow 3 business days for your refill to be completed.          Additional Information About Your Visit        [employer] LabsharXsilon Information     Cinch[employer] gives you secure access to your electronic health record. If you see a primary care provider, you can also send messages to your care team and make appointments. If  "you have questions, please call your primary care clinic.  If you do not have a primary care provider, please call [phone] and they will assist you.        Care EveryWhere ID     This is your Care EveryWhere ID. This could be used by other organizations to access your [facility] medical records  [id number]        Your Vitals Were     Temperature Respirations Height BMI (Body Mass Index)          97.5  F (36.4  C) 18 1.6 m (5' 3\") 36.49 kg/m2         Blood Pressure from Last 3 Encounters:   12/01/17 100/70   09/01/17 (!) 140/96   08/29/17 120/64    Weight from Last 3 Encounters:   12/05/17 93.4 kg (206 lb)   12/01/17 94.3 kg (208 lb)   09/07/17 90.7 kg (200 lb)              Today, you had the following     No orders found for display       Primary Care Provider Office Phone # Fax #    Martin Krishna Howe [phone] [address] [facility] [address]        Equal Access to Services     [facility]: Hadii aad ku hadasho Soomaali, waaxda luqadaha, qaybta kaalmada adeegyada, janie pelaez . So [facility] [phone].    ATENCIÓN: Si habla español, tiene a sharp disposición servicios gratuitos de asistencia lingüística. Llame al [phone].    We comply with applicable federal civil rights laws and [place] laws. We do not discriminate on the basis of race, color, national origin, age, disability, sex, sexual orientation, or gender identity.            Thank you!     Thank you for choosing [facility]  for your care. Our goal is always to provide you with excellent care. Hearing back from our patients is one way we can continue to improve our services. Please take a few minutes to complete the written survey that you may receive in the mail after your visit with us. Thank you!             Your Updated Medication List - Protect others around you: Learn how to safely use, store and throw away your medicines at www.disposemymeds.org.          This list " is accurate as of: 12/5/17  9:34 AM.  Always use your most recent med list.                   Brand Name Dispense Instructions for use Diagnosis    albuterol 108 (90 BASE) MCG/ACT Inhaler    PROAIR HFA    17 Inhaler    INHALE 2 PUFFS INTO THE LUNGS EVERY 6 HOURS AS NEEDED FOR SHORTNESS OF BREATH / DYSPNEA OR WHEEZING    Mild persistent asthma without complication       amLODIPine 10 MG tablet    NORVASC    90 tablet    TAKE 1 TABLET BY MOUTH FOR BLOOD PRESSURE IN THE PM    Essential hypertension with goal blood pressure less than 140/90       budesonide 180 MCG/ACT inhaler    PULMICORT FLEXHALER    3 Inhaler    Inhale 1 puff into the lungs 2 times daily Replaces flovent for insurance    Mild persistent asthma without complication       lisinopril 10 MG tablet    PRINIVIL/ZESTRIL    90 tablet    TAKE 1 TABLET BY MOUTH IN THE MORNING    Essential hypertension with goal blood pressure less than 140/90       order for DME     1 each    CPAP supplies:  Mask, hose, tank, filters    OREN (obstructive sleep apnea)       simvastatin 10 MG tablet    ZOCOR    90 tablet    Take 1 tablet (10 mg) by mouth At Bedtime    Hyperlipidemia LDL goal <130       SINUS RINSE KIT NA       Congestion of paranasal sinus

## 2017-12-05 NOTE — PROGRESS NOTES
Follow-up right knee arthroscopy with chondroplasty on 9/1/17. Had left knee arthroscopy in 7/21/17  He has more pain with right knee arthroscopy.  Wounds are well healed.  He has small prepatellar effusion without tenderness..   There is tenderness of patello-femoral joint.  No tenderness at medial or lateral joint line.  No knee effusion.  No ligament laxity.    Assessment:  Right knee arthroscopy with patello-femoral chondroplasty with persistent pain.  I recommend steroid injection, ibuprofen, strengthening.  He  desires injection today of right knee(s).  Risks, benefits, potential complications and alternatives were discussed.   With the patient's consent, sterile prep was performed of right knee(s).  Right knee was injected with Depo Medrol 80 mg and lidocaine at anteromedial site.  Return to clinic as needed.

## 2018-08-07 DIAGNOSIS — I10 ESSENTIAL HYPERTENSION WITH GOAL BLOOD PRESSURE LESS THAN 140/90: ICD-10-CM

## 2018-08-07 RX ORDER — LISINOPRIL 10 MG/1
TABLET ORAL
Qty: 90 TABLET | Refills: 1 | Status: SHIPPED | OUTPATIENT
Start: 2018-08-07 | End: 2019-01-01

## 2018-08-07 RX ORDER — AMLODIPINE BESYLATE 10 MG/1
TABLET ORAL
Qty: 90 TABLET | Refills: 1 | Status: SHIPPED | OUTPATIENT
Start: 2018-08-07 | End: 2018-10-21

## 2018-10-21 DIAGNOSIS — I10 ESSENTIAL HYPERTENSION WITH GOAL BLOOD PRESSURE LESS THAN 140/90: ICD-10-CM

## 2018-10-23 RX ORDER — AMLODIPINE BESYLATE 10 MG/1
TABLET ORAL
Qty: 90 TABLET | Refills: 0 | Status: SHIPPED | OUTPATIENT
Start: 2018-10-23 | End: 2019-01-29

## 2018-12-18 ENCOUNTER — TELEPHONE (OUTPATIENT)
Dept: FAMILY MEDICINE | Facility: CLINIC | Age: 58
End: 2018-12-18

## 2018-12-18 DIAGNOSIS — E78.5 HYPERLIPIDEMIA LDL GOAL <130: ICD-10-CM

## 2018-12-18 NOTE — LETTER
December 19, 2018    Cesar Foss  [address] [address] [facility] [address]    Dear Cesar,       We recently received a refill request for simvastatin (ZOCOR) 10 MG tablet.  We have refilled this for a one time 30 day supply only because you are due for a:    Annual exam office visit and fasting lab appointment      Please schedule this lab appointment 4-5 days prior to the office visit.     Please call at your earliest convenience so that there will not be a delay with your future refills.          Thank you,   Your [facility] Team/  [phone]

## 2018-12-19 RX ORDER — SIMVASTATIN 10 MG
10 TABLET ORAL AT BEDTIME
Qty: 30 TABLET | Refills: 0 | Status: SHIPPED | OUTPATIENT
Start: 2018-12-19 | End: 2019-03-05

## 2018-12-19 NOTE — TELEPHONE ENCOUNTER
Medication is being filled for 1 time refill only due to:  Patient needs labs for lipids . Patient needs to be seen because due for office visit for annual exam and lipids.. Faith Cardoza RN

## 2019-01-01 DIAGNOSIS — I10 ESSENTIAL HYPERTENSION WITH GOAL BLOOD PRESSURE LESS THAN 140/90: ICD-10-CM

## 2019-01-01 DIAGNOSIS — E78.5 HYPERLIPIDEMIA LDL GOAL <130: ICD-10-CM

## 2019-01-02 ENCOUNTER — MYC MEDICAL ADVICE (OUTPATIENT)
Dept: FAMILY MEDICINE | Facility: CLINIC | Age: 59
End: 2019-01-02

## 2019-01-02 DIAGNOSIS — Z12.5 SCREENING FOR PROSTATE CANCER: ICD-10-CM

## 2019-01-02 DIAGNOSIS — I10 ESSENTIAL HYPERTENSION WITH GOAL BLOOD PRESSURE LESS THAN 140/90: ICD-10-CM

## 2019-01-02 DIAGNOSIS — Z13.6 CARDIOVASCULAR SCREENING; LDL GOAL LESS THAN 160: Primary | ICD-10-CM

## 2019-01-02 DIAGNOSIS — Z00.00 ROUTINE HISTORY AND PHYSICAL EXAMINATION OF ADULT: ICD-10-CM

## 2019-01-02 NOTE — TELEPHONE ENCOUNTER
Please review lab orders sign and close encounter. Megan Hoff MA/AMANDA    Informed patient to schedule a physical

## 2019-01-03 ENCOUNTER — MYC MEDICAL ADVICE (OUTPATIENT)
Dept: FAMILY MEDICINE | Facility: CLINIC | Age: 59
End: 2019-01-03

## 2019-01-03 RX ORDER — SIMVASTATIN 10 MG
10 TABLET ORAL AT BEDTIME
Qty: 90 TABLET | Refills: 0 | Status: SHIPPED | OUTPATIENT
Start: 2019-01-03 | End: 2019-07-22

## 2019-01-03 RX ORDER — LISINOPRIL 10 MG/1
10 TABLET ORAL DAILY
Qty: 30 TABLET | Refills: 0 | Status: SHIPPED | OUTPATIENT
Start: 2019-01-03 | End: 2019-01-29

## 2019-01-03 NOTE — TELEPHONE ENCOUNTER
Routing refill request to provider for review/approval because:  Jessica given x1 and patient did not follow up, please advise  Venus Rutledge BSN, RN

## 2019-01-23 DIAGNOSIS — Z00.00 ROUTINE HISTORY AND PHYSICAL EXAMINATION OF ADULT: ICD-10-CM

## 2019-01-23 DIAGNOSIS — I10 ESSENTIAL HYPERTENSION WITH GOAL BLOOD PRESSURE LESS THAN 140/90: ICD-10-CM

## 2019-01-23 DIAGNOSIS — Z13.6 CARDIOVASCULAR SCREENING; LDL GOAL LESS THAN 160: ICD-10-CM

## 2019-01-23 DIAGNOSIS — Z12.5 SCREENING FOR PROSTATE CANCER: ICD-10-CM

## 2019-01-23 LAB
ALBUMIN SERPL-MCNC: 4 G/DL (ref 3.4–5)
ALP SERPL-CCNC: 52 U/L (ref 40–150)
ALT SERPL W P-5'-P-CCNC: 94 U/L (ref 0–70)
ANION GAP SERPL CALCULATED.3IONS-SCNC: 6 MMOL/L (ref 3–14)
AST SERPL W P-5'-P-CCNC: 83 U/L (ref 0–45)
BILIRUB SERPL-MCNC: 0.9 MG/DL (ref 0.2–1.3)
BUN SERPL-MCNC: 12 MG/DL (ref 7–30)
CALCIUM SERPL-MCNC: 9.1 MG/DL (ref 8.5–10.1)
CHLORIDE SERPL-SCNC: 103 MMOL/L (ref 94–109)
CHOLEST SERPL-MCNC: 170 MG/DL
CO2 SERPL-SCNC: 28 MMOL/L (ref 20–32)
CREAT SERPL-MCNC: 0.87 MG/DL (ref 0.66–1.25)
ERYTHROCYTE [DISTWIDTH] IN BLOOD BY AUTOMATED COUNT: 12.6 % (ref 10–15)
GFR SERPL CREATININE-BSD FRML MDRD: >90 ML/MIN/{1.73_M2}
GLUCOSE SERPL-MCNC: 118 MG/DL (ref 70–99)
HCT VFR BLD AUTO: 44 % (ref 40–53)
HDLC SERPL-MCNC: 37 MG/DL
HGB BLD-MCNC: 15.8 G/DL (ref 13.3–17.7)
LDLC SERPL CALC-MCNC: 97 MG/DL
MCH RBC QN AUTO: 34.4 PG (ref 26.5–33)
MCHC RBC AUTO-ENTMCNC: 35.9 G/DL (ref 31.5–36.5)
MCV RBC AUTO: 96 FL (ref 78–100)
NONHDLC SERPL-MCNC: 133 MG/DL
PLATELET # BLD AUTO: 109 10E9/L (ref 150–450)
POTASSIUM SERPL-SCNC: 3.7 MMOL/L (ref 3.4–5.3)
PROT SERPL-MCNC: 7.7 G/DL (ref 6.8–8.8)
PSA SERPL-ACNC: 0.78 UG/L (ref 0–4)
RBC # BLD AUTO: 4.59 10E12/L (ref 4.4–5.9)
SODIUM SERPL-SCNC: 137 MMOL/L (ref 133–144)
TRIGL SERPL-MCNC: 178 MG/DL
WBC # BLD AUTO: 6.4 10E9/L (ref 4–11)

## 2019-01-23 PROCEDURE — 36415 COLL VENOUS BLD VENIPUNCTURE: CPT | Performed by: FAMILY MEDICINE

## 2019-01-23 PROCEDURE — 80061 LIPID PANEL: CPT | Performed by: FAMILY MEDICINE

## 2019-01-23 PROCEDURE — G0103 PSA SCREENING: HCPCS | Performed by: FAMILY MEDICINE

## 2019-01-23 PROCEDURE — 80053 COMPREHEN METABOLIC PANEL: CPT | Performed by: FAMILY MEDICINE

## 2019-01-23 PROCEDURE — 85027 COMPLETE CBC AUTOMATED: CPT | Performed by: FAMILY MEDICINE

## 2019-01-29 ENCOUNTER — OFFICE VISIT (OUTPATIENT)
Dept: FAMILY MEDICINE | Facility: CLINIC | Age: 59
End: 2019-01-29
Payer: COMMERCIAL

## 2019-01-29 VITALS
TEMPERATURE: 98.1 F | HEART RATE: 85 BPM | WEIGHT: 208 LBS | SYSTOLIC BLOOD PRESSURE: 130 MMHG | OXYGEN SATURATION: 96 % | DIASTOLIC BLOOD PRESSURE: 80 MMHG | RESPIRATION RATE: 20 BRPM | BODY MASS INDEX: 35.51 KG/M2 | HEIGHT: 64 IN

## 2019-01-29 DIAGNOSIS — Z00.00 ROUTINE HISTORY AND PHYSICAL EXAMINATION OF ADULT: Primary | ICD-10-CM

## 2019-01-29 DIAGNOSIS — I10 ESSENTIAL HYPERTENSION WITH GOAL BLOOD PRESSURE LESS THAN 140/90: ICD-10-CM

## 2019-01-29 DIAGNOSIS — Z23 NEED FOR VACCINATION: ICD-10-CM

## 2019-01-29 DIAGNOSIS — J45.30 MILD PERSISTENT ASTHMA WITHOUT COMPLICATION: ICD-10-CM

## 2019-01-29 PROCEDURE — 90471 IMMUNIZATION ADMIN: CPT | Performed by: FAMILY MEDICINE

## 2019-01-29 PROCEDURE — 90715 TDAP VACCINE 7 YRS/> IM: CPT | Performed by: FAMILY MEDICINE

## 2019-01-29 PROCEDURE — 99396 PREV VISIT EST AGE 40-64: CPT | Mod: 25 | Performed by: FAMILY MEDICINE

## 2019-01-29 RX ORDER — AMLODIPINE BESYLATE 10 MG/1
TABLET ORAL
Qty: 90 TABLET | Refills: 3 | Status: SHIPPED | OUTPATIENT
Start: 2019-01-29 | End: 2020-01-09

## 2019-01-29 RX ORDER — LISINOPRIL 10 MG/1
10 TABLET ORAL DAILY
Qty: 90 TABLET | Refills: 3 | Status: SHIPPED | OUTPATIENT
Start: 2019-01-29 | End: 2020-01-09

## 2019-01-29 RX ORDER — ALBUTEROL SULFATE 90 UG/1
AEROSOL, METERED RESPIRATORY (INHALATION)
Qty: 17 INHALER | Refills: 1 | Status: SHIPPED | OUTPATIENT
Start: 2019-01-29 | End: 2023-02-14

## 2019-01-29 ASSESSMENT — ENCOUNTER SYMPTOMS
EYE PAIN: 0
HEMATURIA: 0
ABDOMINAL PAIN: 0
PALPITATIONS: 0
FREQUENCY: 1
CONSTIPATION: 0
JOINT SWELLING: 0
HEADACHES: 1
DIZZINESS: 0
NERVOUS/ANXIOUS: 0
DYSURIA: 0
HEARTBURN: 0
COUGH: 0
DIARRHEA: 0
HEMATOCHEZIA: 0
WEAKNESS: 1
SHORTNESS OF BREATH: 0
NAUSEA: 0
SORE THROAT: 0
PARESTHESIAS: 0
FEVER: 0
CHILLS: 0

## 2019-01-29 ASSESSMENT — MIFFLIN-ST. JEOR: SCORE: 1676.54

## 2019-01-29 NOTE — NURSING NOTE
"Chief Complaint   Patient presents with     Physical       Initial /80   Pulse 85   Temp 98.1  F (36.7  C) (Oral)   Resp 20   Ht 1.613 m (5' 3.5\")   Wt 94.3 kg (208 lb)   SpO2 96%   BMI 36.27 kg/m   Estimated body mass index is 36.27 kg/m  as calculated from the following:    Height as of this encounter: 1.613 m (5' 3.5\").    Weight as of this encounter: 94.3 kg (208 lb).    Venus Topete, FREDRICK    "

## 2019-01-29 NOTE — PROGRESS NOTES
SUBJECTIVE:   CC: Cesar Foss is an 58 year old male who presents for preventative health visit.   90 day scripts please   Follow-up htn, gerd, high cholesterol, hyperglycemia, fatty liver and asthma.  Lack of energy. cpap - sleep ok. No exercise. Emotionally doing ok. Work ok.   Engaged x16 years. One kids/2 step kids.   No nausea, vomiting or diarrhea or black or bloody stools. Colonoscopy up to date. No urine changes/hematuria.   Stream ok.   Limited caffeine. ALCOHOL 2-3 mixed drinks every other day.  Family history mom dm.   Asthma stable.   Physical   Annual:     Getting at least 3 servings of Calcium per day:  Yes    Bi-annual eye exam:  NO    Dental care twice a year:  Yes    Sleep apnea or symptoms of sleep apnea:  Sleep apnea    Diet:  Low fat/cholesterol    Frequency of exercise:  1 day/week    Duration of exercise:  15-30 minutes    Taking medications regularly:  Yes    Medication side effects:  None    Additional concerns today:  No    PHQ-2 Total Score: 0          PROBLEMS TO ADD ON...    Today's PHQ-2 Score:   PHQ-2 ( 1999 Pfizer) 1/29/2019   Q1: Little interest or pleasure in doing things 0   Q2: Feeling down, depressed or hopeless 0   PHQ-2 Score 0   Q1: Little interest or pleasure in doing things Not at all   Q2: Feeling down, depressed or hopeless Not at all   PHQ-2 Score 0       Abuse: Current or Past(Physical, Sexual or Emotional)- No  Do you feel safe in your environment? Yes    Social History     Tobacco Use     Smoking status: Never Smoker     Smokeless tobacco: Never Used     Tobacco comment: non smoking household   Substance Use Topics     Alcohol use: Yes     Comment: 10-15 drinks a month     Alcohol Use 1/29/2019   If you drink alcohol do you typically have greater than 3 drinks per day OR greater than 7 drinks per week? No       Last PSA:   PSA   Date Value Ref Range Status   01/23/2019 0.78 0 - 4 ug/L Final     Comment:     Assay Method:  Chemiluminescence using Siemens Vista  "analyzer       Reviewed orders with patient. Reviewed health maintenance and updated orders accordingly - Yes  Labs reviewed in EPIC    Reviewed and updated as needed this visit by clinical staff         Reviewed and updated as needed this visit by Provider            Review of Systems   Constitutional: Negative for chills and fever.   HENT: Negative for congestion, ear pain, hearing loss and sore throat.    Eyes: Negative for pain and visual disturbance.   Respiratory: Negative for cough and shortness of breath.    Cardiovascular: Negative for chest pain, palpitations and peripheral edema.   Gastrointestinal: Negative for abdominal pain, constipation, diarrhea, heartburn, hematochezia and nausea.   Genitourinary: Positive for frequency. Negative for discharge, dysuria, genital sores, hematuria, impotence and urgency.   Musculoskeletal: Negative for joint swelling.   Skin: Negative for rash.   Neurological: Positive for weakness and headaches. Negative for dizziness and paresthesias.   Psychiatric/Behavioral: The patient is not nervous/anxious.      All other ROS negative.     OBJECTIVE:   /80   Pulse 85   Temp 98.1  F (36.7  C) (Oral)   Resp 20   Ht 1.613 m (5' 3.5\")   Wt 94.3 kg (208 lb)   SpO2 96%   BMI 36.27 kg/m      Physical Exam  GENERAL: healthy, alert and no distress  EYES: Eyes grossly normal to inspection, PERRL and conjunctivae and sclerae normal  HENT: ear canals and TM's normal, nose and mouth without ulcers or lesions  NECK: no adenopathy, no asymmetry, masses, or scars and thyroid normal to palpation  RESP: lungs clear to auscultation - no rales, rhonchi or wheezes  BREAST: normal without masses, tenderness or nipple discharge and no palpable axillary masses or adenopathy  CV: regular rate and rhythm, normal S1 S2, no S3 or S4, no murmur, click or rub, no peripheral edema and peripheral pulses strong  ABDOMEN: soft, nontender, no hepatosplenomegaly, no masses and bowel sounds normal   " "(male): patient deferred /rectal exams.   MS: no gross musculoskeletal defects noted, no edema  SKIN: no suspicious lesions or rashes  NEURO: Normal strength and tone, mentation intact and speech normal  BACK: no CVA tenderness, no paralumbar tenderness  PSYCH: mentation appears normal, affect normal/bright  LYMPH: no cervical, supraclavicular, axillary, or inguinal adenopathy        ASSESSMENT/PLAN:   ASSESSMENT / PLAN:  (Z00.00) Routine history and physical examination of adult  (primary encounter diagnosis)  Comment: generally healthy and normal exam/labs  Plan: Reviewed self mole/testicle check handout.]  vitaminD/exercise    (Z23) Need for vaccination  Plan: TDAP VACCINE (ADACEL) [60970.002], 1st          Administration  [48507]            (J45.30) Mild persistent asthma without complication  Comment: stable  Plan: albuterol (PROAIR HFA) 108 (90 Base) MCG/ACT         inhaler, budesonide (PULMICORT FLEXHALER) 180         MCG/ACT inhaler        Reveiwed risks and side effects of medication      (I10) Essential hypertension with goal blood pressure less than 140/90  Comment: stable but weight gain  Plan: amLODIPine (NORVASC) 10 MG tablet, lisinopril         (PRINIVIL/ZESTRIL) 10 MG tablet        Lower simple carbs/ 10 lbs weight loss. Recheck in 6 months  Sooner if worse. Chest pain to er. Call/email with questions/concerns            COUNSELING:   Reviewed preventive health counseling, as reflected in patient instructions       Regular exercise       Healthy diet/nutrition       Vision screening       Colon cancer screening       Prostate cancer screening       Osteoporosis Prevention/Bone Health    BP Readings from Last 1 Encounters:   12/01/17 100/70     Estimated body mass index is 36.49 kg/m  as calculated from the following:    Height as of 12/5/17: 1.6 m (5' 3\").    Weight as of 12/5/17: 93.4 kg (206 lb).      Weight management plan: Discussed healthy diet and exercise guidelines     reports that  has " never smoked. he has never used smokeless tobacco.      Counseling Resources:  ATP IV Guidelines  Pooled Cohorts Equation Calculator  FRAX Risk Assessment  ICSI Preventive Guidelines  Dietary Guidelines for Americans, 2010  USDA's MyPlate  ASA Prophylaxis  Lung CA Screening    Martin Howe MD  [facility]

## 2019-01-30 ASSESSMENT — ASTHMA QUESTIONNAIRES: ACT_TOTALSCORE: 22

## 2019-03-05 ENCOUNTER — OFFICE VISIT (OUTPATIENT)
Dept: FAMILY MEDICINE | Facility: CLINIC | Age: 59
End: 2019-03-05
Payer: COMMERCIAL

## 2019-03-05 VITALS
WEIGHT: 205 LBS | BODY MASS INDEX: 35.74 KG/M2 | OXYGEN SATURATION: 97 % | SYSTOLIC BLOOD PRESSURE: 134 MMHG | TEMPERATURE: 100.1 F | HEART RATE: 101 BPM | RESPIRATION RATE: 16 BRPM | DIASTOLIC BLOOD PRESSURE: 77 MMHG

## 2019-03-05 DIAGNOSIS — J02.9 SORETHROAT: ICD-10-CM

## 2019-03-05 DIAGNOSIS — J11.1 INFLUENZA-LIKE ILLNESS: Primary | ICD-10-CM

## 2019-03-05 DIAGNOSIS — J45.30 MILD PERSISTENT ASTHMA WITHOUT COMPLICATION: ICD-10-CM

## 2019-03-05 LAB
DEPRECATED S PYO AG THROAT QL EIA: NORMAL
SPECIMEN SOURCE: NORMAL

## 2019-03-05 PROCEDURE — 99214 OFFICE O/P EST MOD 30 MIN: CPT | Performed by: INTERNAL MEDICINE

## 2019-03-05 PROCEDURE — 87081 CULTURE SCREEN ONLY: CPT | Performed by: INTERNAL MEDICINE

## 2019-03-05 PROCEDURE — 87880 STREP A ASSAY W/OPTIC: CPT | Performed by: INTERNAL MEDICINE

## 2019-03-05 RX ORDER — OSELTAMIVIR PHOSPHATE 75 MG/1
75 CAPSULE ORAL 2 TIMES DAILY
Qty: 10 CAPSULE | Refills: 0 | Status: SHIPPED | OUTPATIENT
Start: 2019-03-05 | End: 2019-03-10

## 2019-03-05 ASSESSMENT — PAIN SCALES - GENERAL: PAINLEVEL: SEVERE PAIN (7)

## 2019-03-05 NOTE — NURSING NOTE
"Chief Complaint   Patient presents with     Pharyngitis     pt c/o sinus pressure, congestion, sore throat, body aches, cough and fever x 3 days       Initial /77   Pulse 101   Temp 100.1  F (37.8  C) (Oral)   Resp 16   Wt 93 kg (205 lb)   SpO2 97%   BMI 35.74 kg/m   Estimated body mass index is 35.74 kg/m  as calculated from the following:    Height as of 1/29/19: 1.613 m (5' 3.5\").    Weight as of this encounter: 93 kg (205 lb).  Medication Reconciliation: complete  Patient and/or MA were masked during rooming process  Sukhwinder Villaseñor MA        "

## 2019-03-05 NOTE — LETTER
March 5, 2019      Cesar Foss  [address] [address]        To Whom It May Concern:    Cesar Foss was seen in our clinic for an illness.  He missed work on 3/4 and 3/5/2019 due to illness and may need to miss work for another three days as well.  When his symptoms have improved, he may return to work without restrictions.      Sincerely,        Lauryn Antonio MD

## 2019-03-05 NOTE — PROGRESS NOTES
SUBJECTIVE:  Cesar Foss is an 59 year old male who presents for not feeling well.  Three days ago started with some nasal congestion and sore throat.  Yesterday had fever of 101.  Has had some cough.  This morning throat was more scratchy.  No n/v/d.  Feels quite achy all over.  Has been less active than usual over past three days.  No known exposures.  No recent travel.  Took some tylenol which helps fever some.  Has h/o asthma and doesn't feel like asthma has flared up with this.      PMH:   has a past medical history of Allergic rhinitis due to animal dander, Allergy to mold spores, Arthritis, Bursitis, Diagnostic skin and sensitization tests (aka ALLERGENS) (1/27/15 skin tests pos. for:  cat/dog/DM/M/T/G/W ), High cholesterol, House dust mite allergy, Hypertension goal BP (blood pressure) < 140/90 (7/30/2013), LBP (low back pain), Migraines, Obesity, OREN (obstructive sleep apnea), PONV (postoperative nausea and vomiting), Seasonal allergic conjunctivitis, Seasonal allergic rhinitis, Seizure disorder (H), Thrombocytopenia (H) (7/11/2017), and Unspecified asthma(493.90).  Patient Active Problem List   Diagnosis     BURSITIS, PREPATELLAR - right     Migraine, ophthalmoplegic     CARDIOVASCULAR SCREENING; LDL GOAL LESS THAN 160     Mild persistent asthma     Shoulder impingement - left     OREN (obstructive sleep apnea)     Allergy to mold spores     House dust mite allergy     Allergic rhinitis due to animal dander     Seasonal allergic conjunctivitis     Seasonal allergic rhinitis     Diagnostic skin and sensitization tests (aka ALLERGENS)     Gastroesophageal reflux disease without esophagitis     Fatty liver     Essential hypertension with goal blood pressure less than 140/90     Obesity, Class II, BMI 35-39.9, with comorbidity     Other tear of medial meniscus of left knee as current injury, subsequent encounter     Advanced directives, counseling/discussion     Hyperlipidemia LDL goal <130     Mild  persistent asthma without complication     Chondromalacia, knee, left     Thrombocytopenia (H)     S/P arthroscopy of left knee     Chondromalacia, knee, right     Social History     Socioeconomic History     Marital status: Single     Spouse name: None     Number of children: None     Years of education: None     Highest education level: None   Occupational History     None   Social Needs     Financial resource strain: None     Food insecurity:     Worry: None     Inability: None     Transportation needs:     Medical: None     Non-medical: None   Tobacco Use     Smoking status: Never Smoker     Smokeless tobacco: Never Used     Tobacco comment: non smoking household   Substance and Sexual Activity     Alcohol use: Yes     Comment: 10-15 drinks a month     Drug use: No     Sexual activity: Yes     Partners: Female     Birth control/protection: Condom   Lifestyle     Physical activity:     Days per week: None     Minutes per session: None     Stress: None   Relationships     Social connections:     Talks on phone: None     Gets together: None     Attends Sabianism service: None     Active member of club or organization: None     Attends meetings of clubs or organizations: None     Relationship status: None     Intimate partner violence:     Fear of current or ex partner: None     Emotionally abused: None     Physically abused: None     Forced sexual activity: None   Other Topics Concern     Parent/sibling w/ CABG, MI or angioplasty before 65F 55M? Yes     Comment: MOM   Social History Narrative     None     Family History   Problem Relation Age of Onset     Eye Disorder Mother      Heart Disease Mother         58yo MI.     Lipids Mother      Macular Degeneration Mother      Diabetes Mother      Hypertension Mother      Lipids Father      Cancer Father      Hypertension Father      Alcohol/Drug Brother      Diabetes Sister         hyperglycemic     Obesity Daughter      Glaucoma No family hx of      Cerebrovascular  Disease No family hx of      Thyroid Disease No family hx of        ALLERGIES:  Patient has no known allergies.    Current Outpatient Medications   Medication     albuterol (PROAIR HFA) 108 (90 Base) MCG/ACT inhaler     amLODIPine (NORVASC) 10 MG tablet     budesonide (PULMICORT FLEXHALER) 180 MCG/ACT inhaler     Hypertonic Nasal Wash (SINUS RINSE KIT NA)     lisinopril (PRINIVIL/ZESTRIL) 10 MG tablet     order for DME     simvastatin (ZOCOR) 10 MG tablet     simvastatin (ZOCOR) 10 MG tablet     No current facility-administered medications for this visit.          ROS:  ROS is done and is negative for general/constitutional, eye, ENT, Respiratory, cardiovascular, GI, , Skin, musculoskeletal except as noted elsewhere.  All other review of systems negative except as noted elsewhere.      OBJECTIVE:  /77   Pulse 101   Temp 100.1  F (37.8  C) (Oral)   Resp 16   Wt 93 kg (205 lb)   SpO2 97%   BMI 35.74 kg/m    GENERAL APPEARANCE: Alert, in no acute distress, appears tired  EYES: normal  EARS: External ears normal. Canals clear. TM's normal.  NOSE:mild clear discharge and mildly inflamed mucosa  OROPHARYNX:mild erythema, no tonsillar hypertrophy and no exudates present  NECK:No adenopathy,masses or thyromegaly  RESP: normal and clear to auscultation  CV:regular rate and rhythm and no murmurs, clicks, or gallops  ABDOMEN: Abdomen soft, non-tender. BS normal. No masses, organomegaly  SKIN: no ulcers, lesions or rash  MUSCULOSKELETAL:Musculoskeletal normal      RESULTS  Results for orders placed or performed in visit on 03/05/19   Strep, Rapid Screen   Result Value Ref Range    Specimen Description Throat     Rapid Strep A Screen       NEGATIVE: No Group A streptococcal antigen detected by immunoassay, await culture report.   .  No results found for this or any previous visit (from the past 48 hour(s)).    ASSESSMENT/PLAN:    ASSESSMENT / PLAN:  (R69) Influenza-like illness  (primary encounter  diagnosis)  Comment: sxs c/w influenza.  As has h/o asthma, is in a higher risk group for complications, so tamiflu advised even though has had sxs for more than 48 hours.  sxs c/w influenza and discussed option for testing but as  rapid testing would not change treatment plan and has high false negative rate, testing not done.  Plan: oseltamivir (TAMIFLU) 75 MG capsule        Reviewed medication instructions and side effects. Follow up if experiences side effects.. I reviewed supportive care, otc meds to use if needed, expected course, and signs of concern.  Follow up as needed or if he does not improve within 5 day(s) or if worsens in any way.  Reviewed red flag symptoms and is to go to the ER if experiences any of these.    (J02.9) Sorethroat  Comment: part of influenza sxs.  Rapid strep test negative.  Plan: Strep, Rapid Screen, Beta strep group A culture        Await strep culture and treat if positive.  I reviewed supportive care, otc meds to use if needed, expected course, and signs of concern.  Follow up as needed or if he does not improve within 5 day(s) or if worsens in any way.  Reviewed red flag symptoms and is to go to the ER if experiences any of these.      (J45.30) Mild persistent asthma without complication  Comment: has h/o asthma.  Currently stable.  Plan: continue routine medication and use prn medication as needed.  Reviewed medication instructions and side effects. Follow up if experiences side effects.  His asthma hx places him at higher risk for complications from influenza.    Advised pt to f/u with pcp within 3 months for health maintenance due.    See [facility] for orders, medications, letters, patient instructions    Lauryn Antonio M.D.

## 2019-03-06 LAB
BACTERIA SPEC CULT: NORMAL
SPECIMEN SOURCE: NORMAL

## 2019-07-22 DIAGNOSIS — E78.5 HYPERLIPIDEMIA LDL GOAL <130: ICD-10-CM

## 2019-07-22 RX ORDER — SIMVASTATIN 10 MG
10 TABLET ORAL AT BEDTIME
Qty: 90 TABLET | Refills: 1 | Status: SHIPPED | OUTPATIENT
Start: 2019-07-22 | End: 2020-01-09

## 2019-11-07 ENCOUNTER — HEALTH MAINTENANCE LETTER (OUTPATIENT)
Age: 59
End: 2019-11-07

## 2020-01-09 ENCOUNTER — MYC MEDICAL ADVICE (OUTPATIENT)
Dept: FAMILY MEDICINE | Facility: CLINIC | Age: 60
End: 2020-01-09

## 2020-01-10 ENCOUNTER — DOCUMENTATION ONLY (OUTPATIENT)
Dept: FAMILY MEDICINE | Facility: CLINIC | Age: 60
End: 2020-01-10

## 2020-01-10 DIAGNOSIS — I10 ESSENTIAL HYPERTENSION WITH GOAL BLOOD PRESSURE LESS THAN 140/90: ICD-10-CM

## 2020-01-10 DIAGNOSIS — Z12.5 SCREENING PSA (PROSTATE SPECIFIC ANTIGEN): ICD-10-CM

## 2020-01-10 DIAGNOSIS — E78.5 HYPERLIPIDEMIA LDL GOAL <130: ICD-10-CM

## 2020-01-10 DIAGNOSIS — K21.9 GASTROESOPHAGEAL REFLUX DISEASE WITHOUT ESOPHAGITIS: Primary | ICD-10-CM

## 2020-01-15 DIAGNOSIS — E78.5 HYPERLIPIDEMIA LDL GOAL <130: ICD-10-CM

## 2020-01-15 DIAGNOSIS — Z12.5 SCREENING PSA (PROSTATE SPECIFIC ANTIGEN): ICD-10-CM

## 2020-01-15 DIAGNOSIS — K21.9 GASTROESOPHAGEAL REFLUX DISEASE WITHOUT ESOPHAGITIS: ICD-10-CM

## 2020-01-15 DIAGNOSIS — I10 ESSENTIAL HYPERTENSION WITH GOAL BLOOD PRESSURE LESS THAN 140/90: ICD-10-CM

## 2020-01-15 LAB
ALBUMIN SERPL-MCNC: 4.2 G/DL (ref 3.4–5)
ALP SERPL-CCNC: 50 U/L (ref 40–150)
ALT SERPL W P-5'-P-CCNC: 67 U/L (ref 0–70)
ANION GAP SERPL CALCULATED.3IONS-SCNC: 5 MMOL/L (ref 3–14)
AST SERPL W P-5'-P-CCNC: 66 U/L (ref 0–45)
BILIRUB SERPL-MCNC: 0.8 MG/DL (ref 0.2–1.3)
BUN SERPL-MCNC: 9 MG/DL (ref 7–30)
CALCIUM SERPL-MCNC: 9.4 MG/DL (ref 8.5–10.1)
CHLORIDE SERPL-SCNC: 106 MMOL/L (ref 94–109)
CHOLEST SERPL-MCNC: 192 MG/DL
CO2 SERPL-SCNC: 29 MMOL/L (ref 20–32)
CREAT SERPL-MCNC: 0.79 MG/DL (ref 0.66–1.25)
ERYTHROCYTE [DISTWIDTH] IN BLOOD BY AUTOMATED COUNT: 12.8 % (ref 10–15)
GFR SERPL CREATININE-BSD FRML MDRD: >90 ML/MIN/{1.73_M2}
GLUCOSE SERPL-MCNC: 108 MG/DL (ref 70–99)
HCT VFR BLD AUTO: 44.5 % (ref 40–53)
HDLC SERPL-MCNC: 46 MG/DL
HGB BLD-MCNC: 16 G/DL (ref 13.3–17.7)
LDLC SERPL CALC-MCNC: 106 MG/DL
MCH RBC QN AUTO: 34.9 PG (ref 26.5–33)
MCHC RBC AUTO-ENTMCNC: 36 G/DL (ref 31.5–36.5)
MCV RBC AUTO: 97 FL (ref 78–100)
NONHDLC SERPL-MCNC: 146 MG/DL
PLATELET # BLD AUTO: 109 10E9/L (ref 150–450)
POTASSIUM SERPL-SCNC: 4 MMOL/L (ref 3.4–5.3)
PROT SERPL-MCNC: 7.7 G/DL (ref 6.8–8.8)
PSA SERPL-ACNC: 1.08 UG/L (ref 0–4)
RBC # BLD AUTO: 4.59 10E12/L (ref 4.4–5.9)
SODIUM SERPL-SCNC: 140 MMOL/L (ref 133–144)
TRIGL SERPL-MCNC: 198 MG/DL
WBC # BLD AUTO: 5.1 10E9/L (ref 4–11)

## 2020-01-15 PROCEDURE — 80053 COMPREHEN METABOLIC PANEL: CPT | Performed by: FAMILY MEDICINE

## 2020-01-15 PROCEDURE — G0103 PSA SCREENING: HCPCS | Performed by: FAMILY MEDICINE

## 2020-01-15 PROCEDURE — 85027 COMPLETE CBC AUTOMATED: CPT | Performed by: FAMILY MEDICINE

## 2020-01-15 PROCEDURE — 80061 LIPID PANEL: CPT | Performed by: FAMILY MEDICINE

## 2020-01-15 PROCEDURE — 36415 COLL VENOUS BLD VENIPUNCTURE: CPT | Performed by: FAMILY MEDICINE

## 2020-01-28 ENCOUNTER — OFFICE VISIT (OUTPATIENT)
Dept: URGENT CARE | Facility: URGENT CARE | Age: 60
End: 2020-01-28
Payer: COMMERCIAL

## 2020-01-28 ENCOUNTER — MYC MEDICAL ADVICE (OUTPATIENT)
Dept: FAMILY MEDICINE | Facility: CLINIC | Age: 60
End: 2020-01-28

## 2020-01-28 VITALS
TEMPERATURE: 97.6 F | SYSTOLIC BLOOD PRESSURE: 174 MMHG | DIASTOLIC BLOOD PRESSURE: 95 MMHG | BODY MASS INDEX: 35.74 KG/M2 | HEART RATE: 77 BPM | WEIGHT: 205 LBS | OXYGEN SATURATION: 98 %

## 2020-01-28 DIAGNOSIS — H57.11 EYE PAIN, RIGHT: ICD-10-CM

## 2020-01-28 DIAGNOSIS — S05.91XA RIGHT EYE INJURY, INITIAL ENCOUNTER: Primary | ICD-10-CM

## 2020-01-28 PROCEDURE — 99214 OFFICE O/P EST MOD 30 MIN: CPT | Performed by: PHYSICIAN ASSISTANT

## 2020-01-28 ASSESSMENT — ENCOUNTER SYMPTOMS
SINUS PRESSURE: 0
COUGH: 0
EYE ITCHING: 0
FATIGUE: 0
PHOTOPHOBIA: 0
WHEEZING: 0
RESPIRATORY NEGATIVE: 1
EYE PAIN: 1
CARDIOVASCULAR NEGATIVE: 1
CHEST TIGHTNESS: 0
CHILLS: 0
GASTROINTESTINAL NEGATIVE: 1
PALPITATIONS: 0
SORE THROAT: 0
SINUS PAIN: 0
EYE REDNESS: 1
RHINORRHEA: 0
FEVER: 0
SHORTNESS OF BREATH: 0
EYE DISCHARGE: 1

## 2020-01-28 ASSESSMENT — VISUAL ACUITY: OU: 1

## 2020-01-28 NOTE — NURSING NOTE
VISION   No corrective lenses  Tool used: HOTV   Right eye:        10/20 (20/40)  Left eye:          10/16 (20/32)   Both:  10/12.5     Kirsty Castillo CMA

## 2020-01-28 NOTE — TELEPHONE ENCOUNTER
On four castro yesterday, got a stick in right eye  Eye is sore and tender, eye is very red and watery  Vision is ok, no blurred vision    Advised patient needs to be seen,  unable to see patient tonight, patient to go to urgent care  Patient verbalized understanding      Lola Polk BSN, RN, CPN

## 2020-02-02 DIAGNOSIS — I10 ESSENTIAL HYPERTENSION WITH GOAL BLOOD PRESSURE LESS THAN 140/90: ICD-10-CM

## 2020-02-02 DIAGNOSIS — E78.5 HYPERLIPIDEMIA LDL GOAL <130: ICD-10-CM

## 2020-02-04 DIAGNOSIS — E78.5 HYPERLIPIDEMIA LDL GOAL <130: ICD-10-CM

## 2020-02-04 RX ORDER — SIMVASTATIN 10 MG
TABLET ORAL
Qty: 30 TABLET | Refills: 0 | Status: SHIPPED | OUTPATIENT
Start: 2020-02-04 | End: 2020-03-11

## 2020-02-04 RX ORDER — AMLODIPINE BESYLATE 10 MG/1
TABLET ORAL
Qty: 30 TABLET | Refills: 0 | Status: SHIPPED | OUTPATIENT
Start: 2020-02-04 | End: 2020-02-11

## 2020-02-04 RX ORDER — LISINOPRIL 10 MG/1
10 TABLET ORAL DAILY
Qty: 30 TABLET | Refills: 0 | Status: SHIPPED | OUTPATIENT
Start: 2020-02-04 | End: 2020-02-11

## 2020-02-04 NOTE — TELEPHONE ENCOUNTER
BP Readings from Last 6 Encounters:   01/28/20 (!) 174/95   03/05/19 134/77   01/29/19 130/80   12/01/17 100/70   09/01/17 (!) 140/96   08/29/17 120/64     Elevated prescribe at emergency department.   Please advise on refills.  Next 5 appointments (look out 90 days)    Feb 11, 2020  3:45 PM CST  PHYSICAL with Martin Howe MD  [facility] [address] [facility] [address] [phone]          Arianna Mitchell RN

## 2020-02-05 RX ORDER — SIMVASTATIN 10 MG
TABLET ORAL
Qty: 90 TABLET | Refills: 1 | OUTPATIENT
Start: 2020-02-05

## 2020-02-11 ENCOUNTER — OFFICE VISIT (OUTPATIENT)
Dept: FAMILY MEDICINE | Facility: CLINIC | Age: 60
End: 2020-02-11
Payer: COMMERCIAL

## 2020-02-11 VITALS
BODY MASS INDEX: 36.5 KG/M2 | WEIGHT: 206 LBS | TEMPERATURE: 98.1 F | RESPIRATION RATE: 18 BRPM | HEIGHT: 63 IN | HEART RATE: 92 BPM | DIASTOLIC BLOOD PRESSURE: 81 MMHG | OXYGEN SATURATION: 97 % | SYSTOLIC BLOOD PRESSURE: 147 MMHG

## 2020-02-11 DIAGNOSIS — Z00.00 ROUTINE GENERAL MEDICAL EXAMINATION AT A HEALTH CARE FACILITY: Primary | ICD-10-CM

## 2020-02-11 DIAGNOSIS — E66.01 MORBID OBESITY (H): ICD-10-CM

## 2020-02-11 DIAGNOSIS — M27.8 JAW MASS: ICD-10-CM

## 2020-02-11 DIAGNOSIS — I10 ESSENTIAL HYPERTENSION WITH GOAL BLOOD PRESSURE LESS THAN 140/90: ICD-10-CM

## 2020-02-11 DIAGNOSIS — R53.83 OTHER FATIGUE: ICD-10-CM

## 2020-02-11 DIAGNOSIS — J45.30 MILD PERSISTENT ASTHMA WITHOUT COMPLICATION: ICD-10-CM

## 2020-02-11 PROCEDURE — 99396 PREV VISIT EST AGE 40-64: CPT | Mod: 25 | Performed by: FAMILY MEDICINE

## 2020-02-11 PROCEDURE — 90682 RIV4 VACC RECOMBINANT DNA IM: CPT | Performed by: FAMILY MEDICINE

## 2020-02-11 PROCEDURE — 90471 IMMUNIZATION ADMIN: CPT | Performed by: FAMILY MEDICINE

## 2020-02-11 RX ORDER — AMLODIPINE BESYLATE 10 MG/1
TABLET ORAL
Qty: 90 TABLET | Refills: 1 | Status: SHIPPED | OUTPATIENT
Start: 2020-02-11 | End: 2020-09-24

## 2020-02-11 RX ORDER — BUPROPION HYDROCHLORIDE 150 MG/1
TABLET, EXTENDED RELEASE ORAL
Qty: 60 TABLET | Refills: 5 | Status: SHIPPED | OUTPATIENT
Start: 2020-02-11 | End: 2020-10-22

## 2020-02-11 RX ORDER — LISINOPRIL/HYDROCHLOROTHIAZIDE 10-12.5 MG
1 TABLET ORAL DAILY
Qty: 90 TABLET | Refills: 1 | Status: SHIPPED | OUTPATIENT
Start: 2020-02-11 | End: 2020-06-25

## 2020-02-11 ASSESSMENT — ENCOUNTER SYMPTOMS
PALPITATIONS: 0
DIARRHEA: 0
HEADACHES: 1
FEVER: 0
DIZZINESS: 0
HEARTBURN: 0
DYSURIA: 0
EYE PAIN: 0
FREQUENCY: 0
JOINT SWELLING: 0
SORE THROAT: 0
NAUSEA: 0
CHILLS: 0
MYALGIAS: 0
ARTHRALGIAS: 1
WEAKNESS: 0
SHORTNESS OF BREATH: 0
CONSTIPATION: 0
NERVOUS/ANXIOUS: 0
PARESTHESIAS: 0
HEMATOCHEZIA: 0
COUGH: 0
HEMATURIA: 0
ABDOMINAL PAIN: 0

## 2020-02-11 ASSESSMENT — MIFFLIN-ST. JEOR: SCORE: 1654.54

## 2020-02-11 NOTE — PROGRESS NOTES
Called patient @ [phone] and left a voicemail letting him know he can call our regular scheduling # [phone] to schedule with an ENT provider.  Ania Ca TC

## 2020-02-11 NOTE — PROGRESS NOTES
SUBJECTIVE:   CC: Cesar Foss is an 59 year old male who presents for preventative health visit.   Follow-up htn, gerd, high cholesterol, hyperglycemia, fatty liver and asthma.  Outside blood pressure a little high.   Rare albuterol needed.   Some fullness in bilateral jaw past 6months. Occasionally dry mouth.   Healthy Habits:     Getting at least 3 servings of Calcium per day:  Yes    Bi-annual eye exam:  Yes    Dental care twice a year:  Yes    Sleep apnea or symptoms of sleep apnea:  Sleep apnea    Diet:  Regular (no restrictions)    Frequency of exercise:  1 day/week    Duration of exercise:  15-30 minutes    Taking medications regularly:  Yes    Medication side effects:  None    PHQ-2 Total Score: 0    Additional concerns today:  No          PROBLEMS TO ADD ON...    Today's PHQ-2 Score:   PHQ-2 ( 1999 Pfizer) 2/11/2020   Q1: Little interest or pleasure in doing things 0   Q2: Feeling down, depressed or hopeless 0   PHQ-2 Score 0   Q1: Little interest or pleasure in doing things Not at all   Q2: Feeling down, depressed or hopeless Not at all   PHQ-2 Score 0       Abuse: Current or Past(Physical, Sexual or Emotional)- No  Do you feel safe in your environment? Yes        Social History     Tobacco Use     Smoking status: Never Smoker     Smokeless tobacco: Never Used     Tobacco comment: non smoking household   Substance Use Topics     Alcohol use: Yes     Comment: 10-15 drinks a month     If you drink alcohol do you typically have >3 drinks per day or >7 drinks per week? No    Alcohol Use 2/11/2020   Prescreen: >3 drinks/day or >7 drinks/week? Yes   AUDIT SCORE  4     AUDIT - Alcohol Use Disorders Identification Test - Reproduced from the World Health Organization Audit 2001 (Second Edition) 2/11/2020   1.  How often do you have a drink containing alcohol? 2 to 3 times a week   2.  How many drinks containing alcohol do you have on a typical day when you are drinking? 1 or 2   3.  How often do you have five  or more drinks on one occasion? Less than monthly   4.  How often during the last year have you found that you were not able to stop drinking once you had started? Never   5.  How often during the last year have you failed to do what was normally expected of you because of drinking? Never   6.  How often during the last year have you needed a first drink in the morning to get yourself going after a heavy drinking session? Never   7.  How often during the last year have you had a feeling of guilt or remorse after drinking? Never   8.  How often during the last year have you been unable to remember what happened the night before because of your drinking? Never   9.  Have you or someone else been injured because of your drinking? No   10. Has a relative, friend, doctor or other health care worker been concerned about your drinking or suggested you cut down? No   TOTAL SCORE 4       Last PSA:   PSA   Date Value Ref Range Status   01/15/2020 1.08 0 - 4 ug/L Final     Comment:     Assay Method:  Chemiluminescence using Siemens Vista analyzer       Reviewed orders with patient. Reviewed health maintenance and updated orders accordingly - Yes  Labs reviewed in EPIC    Reviewed and updated as needed this visit by clinical staff  Tobacco  Allergies  Meds         Reviewed and updated as needed this visit by Provider            Review of Systems   Constitutional: Negative for chills and fever.   HENT: Negative for congestion, ear pain, hearing loss and sore throat.    Eyes: Positive for visual disturbance. Negative for pain.   Respiratory: Negative for cough and shortness of breath.    Cardiovascular: Negative for chest pain, palpitations and peripheral edema.   Gastrointestinal: Negative for abdominal pain, constipation, diarrhea, heartburn, hematochezia and nausea.   Genitourinary: Negative for discharge, dysuria, frequency, genital sores, hematuria, impotence and urgency.   Musculoskeletal: Positive for arthralgias.  "Negative for joint swelling and myalgias.   Skin: Negative for rash.   Neurological: Positive for headaches. Negative for dizziness, weakness and paresthesias.   Psychiatric/Behavioral: Negative for mood changes. The patient is not nervous/anxious.      All other ROS negative.     OBJECTIVE:   BP (!) 147/81   Pulse 92   Temp 98.1  F (36.7  C) (Oral)   Resp 18   Ht 1.6 m (5' 3\")   Wt 93.4 kg (206 lb)   SpO2 97%   BMI 36.49 kg/m      Physical Exam  GENERAL: healthy, alert and no distress  EYES: Eyes grossly normal to inspection, PERRL and conjunctivae and sclerae normal  HENT: ear canals and TM's normal, nose and mouth without ulcers or lesions  NECK: no adenopathy, no asymmetry, masses, or scars and thyroid normal to palpation  Fullness bilateral jaw manible angels  RESP: lungs clear to auscultation - no rales, rhonchi or wheezes  BREAST: normal without masses, tenderness or nipple discharge and no palpable axillary masses or adenopathy  CV: regular rate and rhythm, normal S1 S2, no S3 or S4, no murmur, click or rub, no peripheral edema and peripheral pulses strong  ABDOMEN: soft, nontender, no hepatosplenomegaly, no masses and bowel sounds normal   (male): patient deferred /rectal exams. Denies pain/masses/hernia.   MS: no gross musculoskeletal defects noted, no edema  NEURO: Normal strength and tone, mentation intact and speech normal  PSYCH: mentation appears normal, affect normal/bright    ASSESSMENT/PLAN:   ASSESSMENT / PLAN:  (Z00.00) Routine general medical examination at a health care facility  (primary encounter diagnosis)  Comment: generally healthy and normal exam/labs  Plan: .Reviewed self mole/testicle check handout.  vitaminD    (R53.83) Other fatigue  Comment: likely lack of energy.   Plan: buPROPion (WELLBUTRIN SR) 150 MG 12 hr tablet        Patient would like to try wellbutrin for weight loss too. If SUICIAL IDEATION OR HOMOCIDAL IDEATION OR FIONA TO ER. Reveiwed risks and side effects of " "medication  Recheck in 3 months      (E66.01) Morbid obesity (H)  Comment: needs help  Plan: buPROPion (WELLBUTRIN SR) 150 MG 12 hr tablet        8-10 hours fasting diet/lower simple carbs. Recheck in 3 months      (J45.30) Mild persistent asthma without complication  Comment: stable  Plan: budesonide (PULMICORT FLEXHALER) 180 MCG/ACT         inhaler            (I10) Essential hypertension with goal blood pressure less than 140/90  Comment: needs help  Plan: amLODIPine (NORVASC) 10 MG tablet,         lisinopril-hydrochlorothiazide         (PRINZIDE/ZESTORETIC) 10-12.5 MG tablet        Add hydrochlorothiazide and increase exercise/limit sodium. Exercise. Chest pain or shortness of breath to er. Recheck in 3 months-sooner if worse. Reveiwed risks and side effects of medication  Call/email with questions/concerns     (R22.0) Jaw mass  Comment: past 6months. Didn't really have time to address. ?parotids  Plan: OTOLARYNGOLOGY REFERRAL        Patient would like to see ENT.         COUNSELING:   Reviewed preventive health counseling, as reflected in patient instructions       Regular exercise       Healthy diet/nutrition       Vision screening       Colon cancer screening       Prostate cancer screening       Osteoporosis Prevention/Bone Health    Estimated body mass index is 36.49 kg/m  as calculated from the following:    Height as of this encounter: 1.6 m (5' 3\").    Weight as of this encounter: 93.4 kg (206 lb).          reports that he has never smoked. He has never used smokeless tobacco.      Counseling Resources:  ATP IV Guidelines  Pooled Cohorts Equation Calculator  FRAX Risk Assessment  ICSI Preventive Guidelines  Dietary Guidelines for Americans, 2010  USDA's MyPlate  ASA Prophylaxis  Lung CA Screening    Martin Howe MD  [facility]  "

## 2020-02-13 ASSESSMENT — ASTHMA QUESTIONNAIRES: ACT_TOTALSCORE: 23

## 2020-02-13 NOTE — PROGRESS NOTES
I am seeing this patient in consultation for jaw mass at the request of the provider Dr. Martin Howe.      Chief Complaint - Parotid tenderness    History of Present Illness - Cesar Foss is a 59 year old male with swelling and tenderness of both parotid glands. It has been present for 6 months. The patient notes + tenderness most of the time. No changes in size recently, but it maybe slowly growing with time. The patient feels some facial numbness or abnormal sensation, history of skin cancer or any cancer. The parotids do not change with eating. No radiation exposure. The patient doesn't smoke, or has a smoking history. Denies lip or tongue symptoms. Hasn't tried anything for this. No apparent aggravating and alleviating factors.     Past Medical History -   Patient Active Problem List   Diagnosis     BURSITIS, PREPATELLAR - right     Migraine, ophthalmoplegic     CARDIOVASCULAR SCREENING; LDL GOAL LESS THAN 160     Mild persistent asthma     Shoulder impingement - left     OREN (obstructive sleep apnea)     Allergy to mold spores     House dust mite allergy     Allergic rhinitis due to animal dander     Seasonal allergic conjunctivitis     Seasonal allergic rhinitis     Diagnostic skin and sensitization tests (aka ALLERGENS)     Gastroesophageal reflux disease without esophagitis     Fatty liver     Essential hypertension with goal blood pressure less than 140/90     Obesity, Class II, BMI 35-39.9, with comorbidity     Other tear of medial meniscus of left knee as current injury, subsequent encounter     Advanced directives, counseling/discussion     Hyperlipidemia LDL goal <130     Mild persistent asthma without complication     Chondromalacia, knee, left     Thrombocytopenia (H)     S/P arthroscopy of left knee     Chondromalacia, knee, right     Morbid obesity (H)       Current Medications -   Current Outpatient Medications:      albuterol (PROAIR HFA) 108 (90 Base) MCG/ACT inhaler, INHALE 2 PUFFS INTO THE  LUNGS EVERY 6 HOURS AS NEEDED FOR SHORTNESS OF BREATH / DYSPNEA OR WHEEZING, Disp: 17 Inhaler, Rfl: 1     amLODIPine (NORVASC) 10 MG tablet, TAKE 1 TABLET BY MOUTH FOR BLOOD PRESSURE IN THE PM, Disp: 90 tablet, Rfl: 1     budesonide (PULMICORT FLEXHALER) 180 MCG/ACT inhaler, Inhale 1 puff into the lungs 2 times daily Replaces flovent for insurance, Disp: 3 Inhaler, Rfl: 3     buPROPion (WELLBUTRIN SR) 150 MG 12 hr tablet, One tab in AM x7 days, then one tab twice daily. For weight loss/energy, Disp: 60 tablet, Rfl: 5     Hypertonic Nasal Wash (SINUS RINSE KIT NA), , Disp: , Rfl:      lisinopril-hydrochlorothiazide (PRINZIDE/ZESTORETIC) 10-12.5 MG tablet, Take 1 tablet by mouth daily This is a combo pill for blood pressure - replaces just lisinopril, Disp: 90 tablet, Rfl: 1     order for DME, CPAP supplies:  Mask, hose, tank, filters, Disp: 1 each, Rfl: 0     simvastatin (ZOCOR) 10 MG tablet, TAKE 1 TABLET BY MOUTH EVERYDAY AT BEDTIME, Disp: 30 tablet, Rfl: 0    Allergies - No Known Allergies    Social History -   Social History     Socioeconomic History     Marital status: Single     Spouse name: Not on file     Number of children: Not on file     Years of education: Not on file     Highest education level: Not on file   Occupational History     Not on file   Social Needs     Financial resource strain: Not on file     Food insecurity:     Worry: Not on file     Inability: Not on file     Transportation needs:     Medical: Not on file     Non-medical: Not on file   Tobacco Use     Smoking status: Never Smoker     Smokeless tobacco: Never Used     Tobacco comment: non smoking household   Substance and Sexual Activity     Alcohol use: Yes     Comment: 10-15 drinks a month     Drug use: No     Sexual activity: Yes     Partners: Female     Birth control/protection: Condom   Lifestyle     Physical activity:     Days per week: Not on file     Minutes per session: Not on file     Stress: Not on file   Relationships      Social connections:     Talks on phone: Not on file     Gets together: Not on file     Attends Anabaptism service: Not on file     Active member of club or organization: Not on file     Attends meetings of clubs or organizations: Not on file     Relationship status: Not on file     Intimate partner violence:     Fear of current or ex partner: Not on file     Emotionally abused: Not on file     Physically abused: Not on file     Forced sexual activity: Not on file   Other Topics Concern     Parent/sibling w/ CABG, MI or angioplasty before 65F 55M? Yes     Comment: MOM   Social History Narrative     Not on file       Family History -   Family History   Problem Relation Age of Onset     Eye Disorder Mother      Heart Disease Mother         58yo MI.     Lipids Mother      Macular Degeneration Mother      Diabetes Mother      Hypertension Mother      Lipids Father      Cancer Father      Hypertension Father      Alcohol/Drug Brother      Diabetes Sister         hyperglycemic     Obesity Daughter      Glaucoma No family hx of      Cerebrovascular Disease No family hx of      Thyroid Disease No family hx of        Review of Systems - As per HPI and PMHx, otherwise 10+ comprehensive system review is negative.    Physical Exam  BP (!) 143/94   Pulse 95   Wt 93.4 kg (206 lb)   BMI 36.49 kg/m    General - The patient is in no distress.  Alert and oriented x3, answers questions and cooperates with examination appropriately.   Voice and Breathing - The patient was breathing comfortably without the use of accessory muscles. There was no wheezing, stridor, or stertor.  The patients voice was clear and strong.  Head and Face - Normocephalic and atraumatic.    Eyes - Extraocular movements intact. Sclera were not icteric or injected, conjunctiva were pink and moist.  Neurologic - Cranial nerves II-XII are grossly intact. Specifically, the facial nerve is intact, House-Brackmann grade 1 of 6. Facial sensation is intact and  symmetric.  Nose - No significant external deformity.  Nasal mucosa is pink and moist with no abnormal mucus.  The septum was midline, turbinates are of normal size and position.  No polyps, masses, or purulence.  Mouth - Examination of the oral cavity showed pink, healthy oral mucosa. No lesions or ulcerations noted.  The tongue was mobile and protrudes midline.  Oropharynx - The walls of the oropharynx were smooth, symmetric, and had no lesions or ulcerations. The uvula was midline and the palate raised symmetrically.   Neck - the patient points to both tails of the parotid glands.  When I palpate this area I do not feel a discrete mass.  He has mild tenderness with this.  It seems he just has prominent parotid glands without mass the occipital, submental, submandibular, internal jugular chain, and supraclavicular nodes did not demonstrate any abnormal lymph nodes or masses. Palpation of the thyroid was soft and smooth, with no nodules or goiter appreciated.  The trachea was midline..  Ears - The auricles appeared normal. The external auditory canals were nonedematous and nonerythematous. The tympanic membranes are normal in appearance, bony landmarks are intact.  No retraction, perforation, or masses.  No fluid or purulence was seen in the external canal or the middle ear.           A/P - Cesar Foss is a 59 year old male with parotid gland enlargement bilaterally.  I do not feel a discrete mass.  Overall I am not concerned about malignancy.  It is possible he is just noticed this with a slowly developed enlarge over time.  He has not had any evidence of sialadenitis as the symptoms do not worsen with eating.  He gets no acute swelling.  It is bilateral.  I advised him to stop massage and so much.  He may have a little muscle skeletal tenderness of his neck as well that he can try heat massage with adjacent to the parotid gland.  If he develops a discrete mass, further pain, or other symptoms he can return,  or call to discuss CT neck with contrast to look at the parotid glands.         Dean Mcdonald MD  Otolaryngology  [facility]

## 2020-02-14 ENCOUNTER — OFFICE VISIT (OUTPATIENT)
Dept: OTOLARYNGOLOGY | Facility: CLINIC | Age: 60
End: 2020-02-14
Payer: COMMERCIAL

## 2020-02-14 VITALS
DIASTOLIC BLOOD PRESSURE: 94 MMHG | BODY MASS INDEX: 36.49 KG/M2 | SYSTOLIC BLOOD PRESSURE: 143 MMHG | WEIGHT: 206 LBS | HEART RATE: 95 BPM

## 2020-02-14 DIAGNOSIS — R60.9 PAROTID SWELLING: Primary | ICD-10-CM

## 2020-02-14 PROCEDURE — 99243 OFF/OP CNSLTJ NEW/EST LOW 30: CPT | Performed by: OTOLARYNGOLOGY

## 2020-02-14 NOTE — LETTER
2/14/2020         RE: Cesar Foss  [address] [address] [address]        Dear Colleague,    Thank you for referring your patient, Cesar Foss, to the [facility]. Please see a copy of my visit note below.    I am seeing this patient in consultation for jaw mass at the request of the provider Dr. Martin Howe.      Chief Complaint - Parotid tenderness    History of Present Illness - Cesar Foss is a 59 year old male with swelling and tenderness of both parotid glands. It has been present for 6 months. The patient notes + tenderness most of the time. No changes in size recently, but it maybe slowly growing with time. The patient feels some facial numbness or abnormal sensation, history of skin cancer or any cancer. The parotids do not change with eating. No radiation exposure. The patient doesn't smoke, or has a smoking history. Denies lip or tongue symptoms. Hasn't tried anything for this. No apparent aggravating and alleviating factors.     Past Medical History -   Patient Active Problem List   Diagnosis     BURSITIS, PREPATELLAR - right     Migraine, ophthalmoplegic     CARDIOVASCULAR SCREENING; LDL GOAL LESS THAN 160     Mild persistent asthma     Shoulder impingement - left     OREN (obstructive sleep apnea)     Allergy to mold spores     House dust mite allergy     Allergic rhinitis due to animal dander     Seasonal allergic conjunctivitis     Seasonal allergic rhinitis     Diagnostic skin and sensitization tests (aka ALLERGENS)     Gastroesophageal reflux disease without esophagitis     Fatty liver     Essential hypertension with goal blood pressure less than 140/90     Obesity, Class II, BMI 35-39.9, with comorbidity     Other tear of medial meniscus of left knee as current injury, subsequent encounter     Advanced directives, counseling/discussion     Hyperlipidemia LDL goal <130     Mild persistent asthma without complication     Chondromalacia, knee, left      Thrombocytopenia (H)     S/P arthroscopy of left knee     Chondromalacia, knee, right     Morbid obesity (H)       Current Medications -   Current Outpatient Medications:      albuterol (PROAIR HFA) 108 (90 Base) MCG/ACT inhaler, INHALE 2 PUFFS INTO THE LUNGS EVERY 6 HOURS AS NEEDED FOR SHORTNESS OF BREATH / DYSPNEA OR WHEEZING, Disp: 17 Inhaler, Rfl: 1     amLODIPine (NORVASC) 10 MG tablet, TAKE 1 TABLET BY MOUTH FOR BLOOD PRESSURE IN THE PM, Disp: 90 tablet, Rfl: 1     budesonide (PULMICORT FLEXHALER) 180 MCG/ACT inhaler, Inhale 1 puff into the lungs 2 times daily Replaces flovent for insurance, Disp: 3 Inhaler, Rfl: 3     buPROPion (WELLBUTRIN SR) 150 MG 12 hr tablet, One tab in AM x7 days, then one tab twice daily. For weight loss/energy, Disp: 60 tablet, Rfl: 5     Hypertonic Nasal Wash (SINUS RINSE KIT NA), , Disp: , Rfl:      lisinopril-hydrochlorothiazide (PRINZIDE/ZESTORETIC) 10-12.5 MG tablet, Take 1 tablet by mouth daily This is a combo pill for blood pressure - replaces just lisinopril, Disp: 90 tablet, Rfl: 1     order for DME, CPAP supplies:  Mask, hose, tank, filters, Disp: 1 each, Rfl: 0     simvastatin (ZOCOR) 10 MG tablet, TAKE 1 TABLET BY MOUTH EVERYDAY AT BEDTIME, Disp: 30 tablet, Rfl: 0    Allergies - No Known Allergies    Social History -   Social History     Socioeconomic History     Marital status: Single     Spouse name: Not on file     Number of children: Not on file     Years of education: Not on file     Highest education level: Not on file   Occupational History     Not on file   Social Needs     Financial resource strain: Not on file     Food insecurity:     Worry: Not on file     Inability: Not on file     Transportation needs:     Medical: Not on file     Non-medical: Not on file   Tobacco Use     Smoking status: Never Smoker     Smokeless tobacco: Never Used     Tobacco comment: non smoking household   Substance and Sexual Activity     Alcohol use: Yes     Comment: 10-15 drinks a  month     Drug use: No     Sexual activity: Yes     Partners: Female     Birth control/protection: Condom   Lifestyle     Physical activity:     Days per week: Not on file     Minutes per session: Not on file     Stress: Not on file   Relationships     Social connections:     Talks on phone: Not on file     Gets together: Not on file     Attends Islam service: Not on file     Active member of club or organization: Not on file     Attends meetings of clubs or organizations: Not on file     Relationship status: Not on file     Intimate partner violence:     Fear of current or ex partner: Not on file     Emotionally abused: Not on file     Physically abused: Not on file     Forced sexual activity: Not on file   Other Topics Concern     Parent/sibling w/ CABG, MI or angioplasty before 65F 55M? Yes     Comment: MOM   Social History Narrative     Not on file       Family History -   Family History   Problem Relation Age of Onset     Eye Disorder Mother      Heart Disease Mother         58yo MI.     Lipids Mother      Macular Degeneration Mother      Diabetes Mother      Hypertension Mother      Lipids Father      Cancer Father      Hypertension Father      Alcohol/Drug Brother      Diabetes Sister         hyperglycemic     Obesity Daughter      Glaucoma No family hx of      Cerebrovascular Disease No family hx of      Thyroid Disease No family hx of        Review of Systems - As per HPI and PMHx, otherwise 10+ comprehensive system review is negative.    Physical Exam  BP (!) 143/94   Pulse 95   Wt 93.4 kg (206 lb)   BMI 36.49 kg/m     General - The patient is in no distress.  Alert and oriented x3, answers questions and cooperates with examination appropriately.   Voice and Breathing - The patient was breathing comfortably without the use of accessory muscles. There was no wheezing, stridor, or stertor.  The patients voice was clear and strong.  Head and Face - Normocephalic and atraumatic.    Eyes - Extraocular  movements intact. Sclera were not icteric or injected, conjunctiva were pink and moist.  Neurologic - Cranial nerves II-XII are grossly intact. Specifically, the facial nerve is intact, House-Brackmann grade 1 of 6. Facial sensation is intact and symmetric.  Nose - No significant external deformity.  Nasal mucosa is pink and moist with no abnormal mucus.  The septum was midline, turbinates are of normal size and position.  No polyps, masses, or purulence.  Mouth - Examination of the oral cavity showed pink, healthy oral mucosa. No lesions or ulcerations noted.  The tongue was mobile and protrudes midline.  Oropharynx - The walls of the oropharynx were smooth, symmetric, and had no lesions or ulcerations. The uvula was midline and the palate raised symmetrically.   Neck - the patient points to both tails of the parotid glands.  When I palpate this area I do not feel a discrete mass.  He has mild tenderness with this.  It seems he just has prominent parotid glands without mass the occipital, submental, submandibular, internal jugular chain, and supraclavicular nodes did not demonstrate any abnormal lymph nodes or masses. Palpation of the thyroid was soft and smooth, with no nodules or goiter appreciated.  The trachea was midline..  Ears - The auricles appeared normal. The external auditory canals were nonedematous and nonerythematous. The tympanic membranes are normal in appearance, bony landmarks are intact.  No retraction, perforation, or masses.  No fluid or purulence was seen in the external canal or the middle ear.           A/P - Cesar Foss is a 59 year old male with parotid gland enlargement bilaterally.  I do not feel a discrete mass.  Overall I am not concerned about malignancy.  It is possible he is just noticed this with a slowly developed enlarge over time.  He has not had any evidence of sialadenitis as the symptoms do not worsen with eating.  He gets no acute swelling.  It is bilateral.  I advised  him to stop massage and so much.  He may have a little muscle skeletal tenderness of his neck as well that he can try heat massage with adjacent to the parotid gland.  If he develops a discrete mass, further pain, or other symptoms he can return, or call to discuss CT neck with contrast to look at the parotid glands.         Dean Mcdonald MD  Otolaryngology  [facility]      Again, thank you for allowing me to participate in the care of your patient.        Sincerely,        Dean Mcdonald MD

## 2020-03-05 DIAGNOSIS — I10 ESSENTIAL HYPERTENSION WITH GOAL BLOOD PRESSURE LESS THAN 140/90: ICD-10-CM

## 2020-03-05 RX ORDER — LISINOPRIL 10 MG/1
10 TABLET ORAL DAILY
Qty: 30 TABLET | Refills: 0 | OUTPATIENT
Start: 2020-03-05

## 2020-03-05 NOTE — TELEPHONE ENCOUNTER
"Requested Prescriptions   Refused Prescriptions Disp Refills     lisinopril (ZESTRIL) 10 MG tablet [Pharmacy Med Name: LISINOPRIL 10 MG TABLET] 30 tablet 0     Sig: TAKE 1 TABLET (10 MG) BY MOUTH DAILY FOR BLOOD PRESSURE       ACE Inhibitors (Including Combos) Protocol Failed - 3/5/2020 11:36 AM        Failed - Blood pressure under 140/90 in past 12 months     BP Readings from Last 3 Encounters:   02/14/20 (!) 143/94   02/11/20 (!) 147/81   01/28/20 (!) 174/95                 Failed - Medication is active on med list        Passed - Recent (12 mo) or future (30 days) visit within the authorizing provider's specialty     Patient has had an office visit with the authorizing provider or a provider within the authorizing providers department within the previous 12 mos or has a future within next 30 days. See \"Patient Info\" tab in inbasket, or \"Choose Columns\" in Meds & Orders section of the refill encounter.              Passed - Patient is age 18 or older        Passed - Normal serum creatinine on file in past 12 months     Recent Labs   Lab Test 01/15/20  0701   CR 0.79             Passed - Normal serum potassium on file in past 12 months     Recent Labs   Lab Test 01/15/20  0701   POTASSIUM 4.0           '    "

## 2020-03-10 DIAGNOSIS — E78.5 HYPERLIPIDEMIA LDL GOAL <130: ICD-10-CM

## 2020-03-11 RX ORDER — SIMVASTATIN 10 MG
TABLET ORAL
Qty: 90 TABLET | Refills: 3 | Status: SHIPPED | OUTPATIENT
Start: 2020-03-11 | End: 2021-03-24

## 2020-03-19 ENCOUNTER — MYC MEDICAL ADVICE (OUTPATIENT)
Dept: FAMILY MEDICINE | Facility: CLINIC | Age: 60
End: 2020-03-19

## 2020-06-23 DIAGNOSIS — I10 ESSENTIAL HYPERTENSION WITH GOAL BLOOD PRESSURE LESS THAN 140/90: ICD-10-CM

## 2020-06-25 RX ORDER — LISINOPRIL/HYDROCHLOROTHIAZIDE 10-12.5 MG
1 TABLET ORAL DAILY
Qty: 90 TABLET | Refills: 1 | Status: SHIPPED | OUTPATIENT
Start: 2020-06-25 | End: 2020-12-28

## 2020-06-25 NOTE — TELEPHONE ENCOUNTER
Routing refill request to provider for review/approval because:    Patient failed:  Blood pressure under 140/90 in past 12 months  BP Readings from Last 3 Encounters:   02/14/20 (!) 143/94   02/11/20 (!) 147/81   01/28/20 (!) 174/95     Esperanza ARTEAGAN, RN

## 2020-09-24 DIAGNOSIS — I10 ESSENTIAL HYPERTENSION WITH GOAL BLOOD PRESSURE LESS THAN 140/90: ICD-10-CM

## 2020-09-24 RX ORDER — AMLODIPINE BESYLATE 10 MG/1
TABLET ORAL
Qty: 90 TABLET | Refills: 0 | Status: SHIPPED | OUTPATIENT
Start: 2020-09-24 | End: 2020-12-28

## 2020-09-24 NOTE — TELEPHONE ENCOUNTER
Patient was due to see PCP in May for BP, fatigue follow up.     No appointment pending at this time.  Routing to provider to advise.    Esperanza Gibbs BSN, RN

## 2020-09-24 NOTE — LETTER
September 24, 2020    Cesar Foss  [address] [address] [address]    Dear Cesar,       We recently received a refill request for amLODIPine (NORVASC) 10 MG tablet .  We have refilled this for a one time 90 day supply only because you are due for a:    Hypertension office visit      Please call at your earliest convenience so that there will not be a delay with your future refills.          Thank you,   Your [facility] Team/sp  [phone]

## 2020-10-06 ENCOUNTER — MYC MEDICAL ADVICE (OUTPATIENT)
Dept: FAMILY MEDICINE | Facility: CLINIC | Age: 60
End: 2020-10-06

## 2020-10-06 NOTE — TELEPHONE ENCOUNTER
RN drafted and signed letter.  Printed and placed in TC basket to process.     Esperanza Gibbs BSN, RN

## 2020-10-06 NOTE — LETTER
[facility]  [address] [facility] [id number] [phone]        October 6, 2020      Cesar ROSE Foss  [address] [address]      Dear Cesar,    As your health care provider, I am concerned that your age and/or underlying medical condition puts you at particularly high risk for serious complications should you contract a COVID-19 infection.     Specifically, you have the following conditions/diagnoses, included as high risk conditions per the Centers for Disease Control and Prevention at CDC.gov.     Asthma  Hypertension  Obesity    COVID-19 is a new disease and there is limited information regarding risk factors for severe disease. Based on currently available information and clinical expertise, older adults and people of any age who have serious underlying medical conditions might be at higher risk for severe illness from COVID-19.     It is my medical opinion that you should avoid close contact with others and work from home if possible during this COVID-19 pandemic.     Martin Howe MD    [facility]

## 2020-10-22 DIAGNOSIS — E66.01 MORBID OBESITY (H): ICD-10-CM

## 2020-10-22 DIAGNOSIS — R53.83 OTHER FATIGUE: ICD-10-CM

## 2020-10-22 RX ORDER — BUPROPION HYDROCHLORIDE 150 MG/1
TABLET, EXTENDED RELEASE ORAL
Qty: 180 TABLET | Refills: 0 | Status: SHIPPED | OUTPATIENT
Start: 2020-10-22 | End: 2021-01-25

## 2020-11-14 ENCOUNTER — VIRTUAL VISIT (OUTPATIENT)
Dept: FAMILY MEDICINE | Facility: OTHER | Age: 60
End: 2020-11-14

## 2020-11-14 NOTE — PROGRESS NOTES
"Date: 2020 13:34:53  Clinician: Katherine Cabrera  Clinician NPI: [id number]  Patient: Cesar Foss  Patient : 1960  Patient Address: [address] [id number]  Patient Phone: [phone]  Visit Protocol: URI  Patient Summary:  Cesar is a 60 year old ( : 1960 ) male who initiated a [facility] Visit for COVID-19 (Coronavirus) evaluation and screening. When asked the question \"Please sign me up to receive news, health information and promotions. \", Cesar responded \"Yes\".    Cesar states his symptoms started gradually 3-4 days ago. After his symptoms started, they improved and then got worse again.   His symptoms consist of vomiting, rhinitis, myalgia, malaise, ageusia, diarrhea, a headache, a cough, nasal congestion, and anosmia. He is experiencing mild difficulty breathing with activities but can speak normally in full sentences.   Symptom details     Nasal secretions: The color of his mucus is yellow and green.    Cough: Cesar coughs every 5-10 minutes and his cough is not more bothersome at night. Phlegm comes into his throat when he coughs. He does not believe his cough is caused by post-nasal drip. The color of the phlegm is green.     Headache: He states the headache is moderate (4-6 on a 10 point pain scale).      Cesar denies having facial pain or pressure, chills, sore throat, teeth pain, ear pain, wheezing, fever, and nausea. He also denies taking antibiotic medication in the past month, having recent facial or sinus surgery in the past 60 days, and having a sinus infection within the past year.   Precipitating events  He has not recently been exposed to someone with influenza. Cesar has been in close contact with the following high risk individuals: adults 65 or older and children under the age of 5.   Pertinent COVID-19 (Coronavirus) information  Cesar does not work or volunteer as healthcare worker or a . In the past 14 days, Cesar has not worked or volunteered at a " healthcare facility or group living setting.   In the past 14 days, he also has not lived in a congregate living setting.   Cesar has had a close contact with a laboratory-confirmed COVID-19 patient within 14 days of symptom onset. He was not exposed at his work. He does not know when he was exposed to the laboratory-confirmed COVID-19 patient.   Additional information about contact with COVID-19 (Coronavirus) patient as reported by the patient (free text): My granddaughter exposed me last week Wednesday 11/11/2020 she tested positive yesterday 11/13/2020    Since December 2019, Cesar has not been tested for COVID-19 and has not had upper respiratory infection or influenza-like illness.   Pertinent medical history  Cesar does not need a return to work/school note.   Weight: 215 lbs   Cesar does not smoke or use smokeless tobacco.   Weight: 215 lbs    MEDICATIONS: lisinopril-hydrochlorothiazide oral, ALLERGIES: NKDA  Clinician Response:  Dear Cesar,   Your symptoms show that you may have coronavirus (COVID-19). This illness can cause fever, cough and trouble breathing. Many people get a mild case and get better on their own. Some people can get very sick.  What should I do?  We would like to test you for this virus.   1. Please call [phone] to schedule your visit. Explain that you were referred by [facility] to have a COVID-19 test. Be ready to share your [facility] visit ID number.  * If you need to schedule in [facility] please call [phone] or for [employer] employees please call [phone].  * If you need to schedule in the [place] area please call [phone]. [place] employees call [phone].  The following will serve as your written order for this COVID Test, ordered by me, for the indication of suspected COVID [Z20.828]: The test will be ordered in [employer], our electronic health record, after you are scheduled. It will show as ordered and authorized by Refugio Mcgill MD.  Order: COVID-19 (Coronavirus) PCR for  "SYMPTOMATIC testing from [facility].   2. When it's time for your COVID test:  Stay at least 6 feet away from others. (If someone will drive you to your test, stay in the backseat, as far away from the  as you can.)   Cover your mouth and nose with a mask, tissue or washcloth.  Go straight to the testing site. Don't make any stops on the way there or back.      3.Starting now: Stay home and away from others (self-isolate) until:   You've had no fever---and no medicine that reduces fever---for one full day (24 hours). And...   Your other symptoms have gotten better. For example, your cough or breathing has improved. And...   At least 10 days have passed since your symptoms started.       During this time, don't leave the house except for testing or medical care.   Stay in your own room, even for meals. Use your own bathroom if you can.   Stay away from others in your home. No hugging, kissing or shaking hands. No visitors.  Don't go to work, school or anywhere else.    Clean \"high touch\" surfaces often (doorknobs, counters, handles, etc.). Use a household cleaning spray or wipes. You'll find a full list of  on the EPA website: www.epa.gov/pesticide-registration/list-n-disinfectants-use-against-sars-cov-2.   Cover your mouth and nose with a mask, tissue or washcloth to avoid spreading germs.  Wash your hands and face often. Use soap and water.  Caregivers in these groups are at risk for severe illness due to COVID-19:  o People 65 years and older  o People who live in a nursing home or long-term care facility  o People with chronic disease (lung, heart, cancer, diabetes, kidney, liver, immunologic)  o People who have a weakened immune system, including those who:   Are in cancer treatment  Take medicine that weakens the immune system, such as corticosteroids  Had a bone marrow or organ transplant  Have an immune deficiency  Have poorly controlled HIV or AIDS  Are obese (body mass index of 40 or higher)  " Smoke regularly   o Caregivers should wear gloves while washing dishes, handling laundry and cleaning bedrooms and bathrooms.  o Use caution when washing and drying laundry: Don't shake dirty laundry, and use the warmest water setting that you can.  o For more tips, go to www.cdc.gov/coronavirus/2019-ncov/downloads/10Things.pdf.    4.Sign up for [employer]. We know it's scary to hear that you might have COVID-19. We want to track your symptoms to make sure you're okay over the next 2 weeks. Please look for an email from [employer]---this is a free, online program that we'll use to keep in touch. To sign up, follow the link in the email. Learn more at [email or web address]  How can I take care of myself?   Get lots of rest. Drink extra fluids (unless a doctor has told you not to).   Take Tylenol (acetaminophen) for fever or pain. If you have liver or kidney problems, ask your family doctor if it's okay to take Tylenol.   Adults can take either:    650 mg (two 325 mg pills) every 4 to 6 hours, or...   1,000 mg (two 500 mg pills) every 8 hours as needed.    Note: Don't take more than 3,000 mg in one day. Acetaminophen is found in many medicines (both prescribed and over-the-counter medicines). Read all labels to be sure you don't take too much.   For children, check the Tylenol bottle for the right dose. The dose is based on the child's age or weight.    If you have other health problems (like cancer, heart failure, an organ transplant or severe kidney disease): Call your specialty clinic if you don't feel better in the next 2 days.       Know when to call 911. Emergency warning signs include:    Trouble breathing or shortness of breath Pain or pressure in the chest that doesn't go away Feeling confused like you haven't felt before, or not being able to wake up Bluish-colored lips or face.  Where can I get more information?    [facility] -- About COVID-19: [email or web address]   CDC --  What to Do If You're Sick: www.cdc.gov/coronavirus/2019-ncov/about/steps-when-sick.html   CDC -- Ending Home Isolation: www.cdc.gov/coronavirus/2019-ncov/hcp/disposition-in-home-patients.html   [facility] -- Caring for Someone: www.cdc.gov/coronavirus/2019-ncov/if-you-are-sick/care-for-someone.html   [facility] -- Interim Guidance for Hospital Discharge to Home: www.[facility].[place]./diseases/coronavirus/hcp/hospdischarge.pdf   [facility] clinical trials (COVID-19 research studies): clinicalaffairs.[facility]-clinical-trials    Below are the COVID-19 hotlines at the [place] Department of Health ([facility]). Interpreters are available.    For health questions: Call 634-051-4195 or 1-476.927.3419 (7 a.m. to 7 p.m.) For questions about schools and childcare: Call 636-807-3685 or 1-399.632.9760 (7 a.m. to 7 p.m.)    Diagnosis: Cough  Diagnosis ICD: R05

## 2020-12-24 DIAGNOSIS — I10 ESSENTIAL HYPERTENSION WITH GOAL BLOOD PRESSURE LESS THAN 140/90: ICD-10-CM

## 2020-12-24 NOTE — LETTER
December 29, 2020    Cesar Reinaerstamarilyn  [address] [facility] [address]    Dear Cesar,       We recently received a refill request for lisinopril-hydrochlorothiazide and amlodipine.  We have refilled this for a one time 90 day supply only because you are due for a:    Physical office visit and fasting lab appointment-needed in the next 6 weeks per Dr Howe.      Please schedule this lab appointment 4-5 days prior to the office visit.     Please call at your earliest convenience so that there will not be a delay with your future refills.          Thank you,   Your [facility] Team/  [phone]

## 2020-12-28 RX ORDER — AMLODIPINE BESYLATE 10 MG/1
TABLET ORAL
Qty: 90 TABLET | Refills: 0 | Status: SHIPPED | OUTPATIENT
Start: 2020-12-28 | End: 2021-02-22

## 2020-12-28 RX ORDER — LISINOPRIL/HYDROCHLOROTHIAZIDE 10-12.5 MG
1 TABLET ORAL DAILY
Qty: 90 TABLET | Refills: 0 | Status: SHIPPED | OUTPATIENT
Start: 2020-12-28 | End: 2021-02-01

## 2020-12-28 NOTE — TELEPHONE ENCOUNTER
Routing refill request to provider for review/approval because:  BP Readings from Last 3 Encounters:   02/14/20 (!) 143/94   02/11/20 (!) 147/81   01/28/20 (!) 174/95

## 2021-01-24 DIAGNOSIS — E66.01 MORBID OBESITY (H): ICD-10-CM

## 2021-01-24 DIAGNOSIS — R53.83 OTHER FATIGUE: ICD-10-CM

## 2021-01-25 RX ORDER — BUPROPION HYDROCHLORIDE 150 MG/1
TABLET, EXTENDED RELEASE ORAL
Qty: 180 TABLET | Refills: 0 | Status: SHIPPED | OUTPATIENT
Start: 2021-01-25 | End: 2021-04-21

## 2021-01-25 NOTE — TELEPHONE ENCOUNTER
Prescription approved per [facility] [place] [place] Refill Protocol.  APPT NEEDED FOR FURTHER REFILLS  Jessica refill given per RN protocol.   Due for office visit  Pharmacy note to inform pt of office visit needed for continued medication use  rAianna Mitchell RN

## 2021-02-01 ENCOUNTER — MYC REFILL (OUTPATIENT)
Dept: FAMILY MEDICINE | Facility: CLINIC | Age: 61
End: 2021-02-01

## 2021-02-01 DIAGNOSIS — I10 ESSENTIAL HYPERTENSION WITH GOAL BLOOD PRESSURE LESS THAN 140/90: ICD-10-CM

## 2021-02-02 RX ORDER — LISINOPRIL/HYDROCHLOROTHIAZIDE 10-12.5 MG
1 TABLET ORAL DAILY
Qty: 90 TABLET | Refills: 0 | Status: SHIPPED | OUTPATIENT
Start: 2021-02-02 | End: 2023-01-09

## 2021-02-02 NOTE — TELEPHONE ENCOUNTER
Routing refill request to provider for review/approval because:  Labs not current:  Potassium, sodium, creatinine    Next 5 appointments (look out 90 days)    Feb 22, 2021 10:00 AM  PHYSICAL with Martin Howe MD  [facility] [address] [facility] [address]  [phone]        Esperanza Gibbs BSN, RN

## 2021-02-04 ENCOUNTER — DOCUMENTATION ONLY (OUTPATIENT)
Dept: LAB | Facility: CLINIC | Age: 61
End: 2021-02-04

## 2021-02-04 DIAGNOSIS — I10 ESSENTIAL HYPERTENSION WITH GOAL BLOOD PRESSURE LESS THAN 140/90: ICD-10-CM

## 2021-02-04 DIAGNOSIS — Z00.00 PREVENTATIVE HEALTH CARE: Primary | ICD-10-CM

## 2021-02-04 DIAGNOSIS — Z12.5 SCREENING PSA (PROSTATE SPECIFIC ANTIGEN): ICD-10-CM

## 2021-02-04 DIAGNOSIS — E78.5 HYPERLIPIDEMIA LDL GOAL <130: ICD-10-CM

## 2021-02-10 DIAGNOSIS — E78.5 HYPERLIPIDEMIA LDL GOAL <130: ICD-10-CM

## 2021-02-10 DIAGNOSIS — Z12.5 SCREENING PSA (PROSTATE SPECIFIC ANTIGEN): ICD-10-CM

## 2021-02-10 DIAGNOSIS — Z00.00 PREVENTATIVE HEALTH CARE: ICD-10-CM

## 2021-02-10 LAB
ALBUMIN SERPL-MCNC: 4.1 G/DL (ref 3.4–5)
ALP SERPL-CCNC: 48 U/L (ref 40–150)
ALT SERPL W P-5'-P-CCNC: 67 U/L (ref 0–70)
ANION GAP SERPL CALCULATED.3IONS-SCNC: 4 MMOL/L (ref 3–14)
AST SERPL W P-5'-P-CCNC: 75 U/L (ref 0–45)
BILIRUB SERPL-MCNC: 0.7 MG/DL (ref 0.2–1.3)
BUN SERPL-MCNC: 10 MG/DL (ref 7–30)
CALCIUM SERPL-MCNC: 9.6 MG/DL (ref 8.5–10.1)
CHLORIDE SERPL-SCNC: 103 MMOL/L (ref 94–109)
CHOLEST SERPL-MCNC: 183 MG/DL
CO2 SERPL-SCNC: 30 MMOL/L (ref 20–32)
CREAT SERPL-MCNC: 0.9 MG/DL (ref 0.66–1.25)
ERYTHROCYTE [DISTWIDTH] IN BLOOD BY AUTOMATED COUNT: 12.9 % (ref 10–15)
GFR SERPL CREATININE-BSD FRML MDRD: >90 ML/MIN/{1.73_M2}
GLUCOSE SERPL-MCNC: 119 MG/DL (ref 70–99)
HCT VFR BLD AUTO: 44.4 % (ref 40–53)
HDLC SERPL-MCNC: 41 MG/DL
HGB BLD-MCNC: 15.9 G/DL (ref 13.3–17.7)
LDLC SERPL CALC-MCNC: 95 MG/DL
MCH RBC QN AUTO: 34.7 PG (ref 26.5–33)
MCHC RBC AUTO-ENTMCNC: 35.8 G/DL (ref 31.5–36.5)
MCV RBC AUTO: 97 FL (ref 78–100)
NONHDLC SERPL-MCNC: 142 MG/DL
PLATELET # BLD AUTO: 99 10E9/L (ref 150–450)
POTASSIUM SERPL-SCNC: 3.8 MMOL/L (ref 3.4–5.3)
PROT SERPL-MCNC: 7.6 G/DL (ref 6.8–8.8)
PSA SERPL-ACNC: 0.86 UG/L (ref 0–4)
RBC # BLD AUTO: 4.58 10E12/L (ref 4.4–5.9)
SODIUM SERPL-SCNC: 137 MMOL/L (ref 133–144)
TRIGL SERPL-MCNC: 236 MG/DL
WBC # BLD AUTO: 6.3 10E9/L (ref 4–11)

## 2021-02-10 PROCEDURE — 36415 COLL VENOUS BLD VENIPUNCTURE: CPT | Performed by: FAMILY MEDICINE

## 2021-02-10 PROCEDURE — 80061 LIPID PANEL: CPT | Performed by: FAMILY MEDICINE

## 2021-02-10 PROCEDURE — 85027 COMPLETE CBC AUTOMATED: CPT | Performed by: FAMILY MEDICINE

## 2021-02-10 PROCEDURE — G0103 PSA SCREENING: HCPCS | Performed by: FAMILY MEDICINE

## 2021-02-10 PROCEDURE — 80053 COMPREHEN METABOLIC PANEL: CPT | Performed by: FAMILY MEDICINE

## 2021-02-19 ASSESSMENT — ENCOUNTER SYMPTOMS
MYALGIAS: 0
WEAKNESS: 0
ABDOMINAL PAIN: 1
HEMATOCHEZIA: 0
DIARRHEA: 1
PALPITATIONS: 1
EYE PAIN: 0
CONSTIPATION: 0
NERVOUS/ANXIOUS: 0
FREQUENCY: 0
ARTHRALGIAS: 0
SHORTNESS OF BREATH: 0
HEMATURIA: 0
SORE THROAT: 1
COUGH: 1
DIZZINESS: 0
NAUSEA: 1
PARESTHESIAS: 1
CHILLS: 0
DYSURIA: 0
HEARTBURN: 1
FEVER: 0
JOINT SWELLING: 0
HEADACHES: 0

## 2021-02-22 ENCOUNTER — OFFICE VISIT (OUTPATIENT)
Dept: FAMILY MEDICINE | Facility: CLINIC | Age: 61
End: 2021-02-22
Payer: COMMERCIAL

## 2021-02-22 VITALS
HEIGHT: 64 IN | SYSTOLIC BLOOD PRESSURE: 152 MMHG | OXYGEN SATURATION: 95 % | DIASTOLIC BLOOD PRESSURE: 87 MMHG | TEMPERATURE: 98 F | BODY MASS INDEX: 34.15 KG/M2 | HEART RATE: 107 BPM | WEIGHT: 200 LBS

## 2021-02-22 DIAGNOSIS — Z00.00 ROUTINE GENERAL MEDICAL EXAMINATION AT A HEALTH CARE FACILITY: Primary | ICD-10-CM

## 2021-02-22 DIAGNOSIS — R21 RASH: ICD-10-CM

## 2021-02-22 DIAGNOSIS — K22.719 BARRETT'S ESOPHAGUS WITH DYSPLASIA: ICD-10-CM

## 2021-02-22 DIAGNOSIS — I10 ESSENTIAL HYPERTENSION WITH GOAL BLOOD PRESSURE LESS THAN 140/90: ICD-10-CM

## 2021-02-22 DIAGNOSIS — J45.30 MILD PERSISTENT ASTHMA WITHOUT COMPLICATION: ICD-10-CM

## 2021-02-22 PROCEDURE — 99214 OFFICE O/P EST MOD 30 MIN: CPT | Mod: 25 | Performed by: FAMILY MEDICINE

## 2021-02-22 PROCEDURE — 99396 PREV VISIT EST AGE 40-64: CPT | Performed by: FAMILY MEDICINE

## 2021-02-22 RX ORDER — LISINOPRIL AND HYDROCHLOROTHIAZIDE 20; 25 MG/1; MG/1
1 TABLET ORAL DAILY
Qty: 90 TABLET | Refills: 1 | Status: SHIPPED | OUTPATIENT
Start: 2021-02-22 | End: 2021-09-17

## 2021-02-22 RX ORDER — KETOCONAZOLE 20 MG/ML
SHAMPOO TOPICAL DAILY PRN
Qty: 120 ML | Refills: 1 | Status: SHIPPED | OUTPATIENT
Start: 2021-02-22 | End: 2021-03-08

## 2021-02-22 RX ORDER — AMLODIPINE BESYLATE 10 MG/1
TABLET ORAL
Qty: 90 TABLET | Refills: 1 | Status: SHIPPED | OUTPATIENT
Start: 2021-02-22 | End: 2021-09-17

## 2021-02-22 RX ORDER — BUDESONIDE 180 UG/1
1 AEROSOL, POWDER RESPIRATORY (INHALATION) 2 TIMES DAILY
Qty: 3 EACH | Refills: 1 | Status: SHIPPED | OUTPATIENT
Start: 2021-02-22 | End: 2023-02-14

## 2021-02-22 ASSESSMENT — ENCOUNTER SYMPTOMS
COUGH: 1
DYSURIA: 0
SORE THROAT: 1
CHILLS: 0
MYALGIAS: 0
DIARRHEA: 1
JOINT SWELLING: 0
ARTHRALGIAS: 0
EYE PAIN: 0
FREQUENCY: 0
ABDOMINAL PAIN: 1
NAUSEA: 1
HEADACHES: 0
HEMATURIA: 0
DIZZINESS: 0
PARESTHESIAS: 1
HEARTBURN: 1
NERVOUS/ANXIOUS: 0
PALPITATIONS: 1
CONSTIPATION: 0
FEVER: 0
WEAKNESS: 0
SHORTNESS OF BREATH: 0
HEMATOCHEZIA: 0

## 2021-02-22 ASSESSMENT — ASTHMA QUESTIONNAIRES
QUESTION_5 LAST FOUR WEEKS HOW WOULD YOU RATE YOUR ASTHMA CONTROL: WELL CONTROLLED
QUESTION_2 LAST FOUR WEEKS HOW OFTEN HAVE YOU HAD SHORTNESS OF BREATH: THREE TO SIX TIMES A WEEK
QUESTION_3 LAST FOUR WEEKS HOW OFTEN DID YOUR ASTHMA SYMPTOMS (WHEEZING, COUGHING, SHORTNESS OF BREATH, CHEST TIGHTNESS OR PAIN) WAKE YOU UP AT NIGHT OR EARLIER THAN USUAL IN THE MORNING: NOT AT ALL
QUESTION_4 LAST FOUR WEEKS HOW OFTEN HAVE YOU USED YOUR RESCUE INHALER OR NEBULIZER MEDICATION (SUCH AS ALBUTEROL): TWO OR THREE TIMES PER WEEK
QUESTION_1 LAST FOUR WEEKS HOW MUCH OF THE TIME DID YOUR ASTHMA KEEP YOU FROM GETTING AS MUCH DONE AT WORK, SCHOOL OR AT HOME: NONE OF THE TIME
ACT_TOTALSCORE: 20

## 2021-02-22 ASSESSMENT — MIFFLIN-ST. JEOR: SCORE: 1634.22

## 2021-02-22 NOTE — PROGRESS NOTES
SUBJECTIVE:   CC: Cesar Foss is an 60 year old male who presents for preventative health visit.   Follow-up htn, gerd, high cholesterol, hyperglycemia, fatty liver, obesity and asthma.  Was in ED for chest pain last week and normal xray/ekg/labs/trop - thought to be from GERD - improved with GI cocktail. .   History barretts and overdue for EGD. Ok with [place].   prilosec once daily over the counter. gerd symptoms and burning and  Dysphagia.   History lap band too. Some weight loss. Needs exercise.  No chest pain. Asthma stable.   Good exercise tolerance. No nausea, vomiting or diarrhea. No black/bloody stools. No urine changes or hematuria.   cpap for [place] - working well. Working from home.   Rash [id number] on trunk. Some itching ?started with wellbutrin. .   No meds.   Patient has been advised of split billing requirements and indicates understanding: Yes  Healthy Habits:     Getting at least 3 servings of Calcium per day:  NO    Bi-annual eye exam:  NO    Dental care twice a year:  Yes    Sleep apnea or symptoms of sleep apnea:  None and Sleep apnea    Diet:  Regular (no restrictions)    Frequency of exercise:  2-3 days/week    Duration of exercise:  15-30 minutes    Taking medications regularly:  No    Barriers to taking medications:  Other    Medication side effects:  Not applicable    PHQ-2 Total Score: 0    Additional concerns today:  No        Today's PHQ-2 Score:   PHQ-2 ( 1999 Pfizer) 2/19/2021   Q1: Little interest or pleasure in doing things 0   Q2: Feeling down, depressed or hopeless 0   PHQ-2 Score 0   Q1: Little interest or pleasure in doing things Not at all   Q2: Feeling down, depressed or hopeless Not at all   PHQ-2 Score 0       Abuse: Current or Past(Physical, Sexual or Emotional)- No  Do you feel safe in your environment? Yes        Social History     Tobacco Use     Smoking status: Never Smoker     Smokeless tobacco: Never Used     Tobacco comment: non smoking household   Substance Use  "Topics     Alcohol use: Yes     Comment: 10-15 drinks a month     If you drink alcohol do you typically have >3 drinks per day or >7 drinks per week? No    Alcohol Use 2/19/2021   Prescreen: >3 drinks/day or >7 drinks/week? No   Prescreen: >3 drinks/day or >7 drinks/week? -   AUDIT SCORE  -       Last PSA:   PSA   Date Value Ref Range Status   02/10/2021 0.86 0 - 4 ug/L Final     Comment:     Assay Method:  Chemiluminescence using Siemens Vista analyzer       Reviewed orders with patient. Reviewed health maintenance and updated orders accordingly - Yes  Labs reviewed in EPIC    Reviewed and updated as needed this visit by clinical staff                 Reviewed and updated as needed this visit by Provider                    Review of Systems   Constitutional: Negative for chills and fever.   HENT: Positive for congestion and sore throat. Negative for ear pain and hearing loss.    Eyes: Negative for pain and visual disturbance.   Respiratory: Positive for cough. Negative for shortness of breath.    Cardiovascular: Positive for chest pain and palpitations. Negative for peripheral edema.   Gastrointestinal: Positive for abdominal pain, diarrhea, heartburn and nausea. Negative for constipation and hematochezia.   Genitourinary: Negative for discharge, dysuria, frequency, genital sores, hematuria, impotence and urgency.   Musculoskeletal: Negative for arthralgias, joint swelling and myalgias.   Skin: Positive for rash.   Neurological: Positive for paresthesias. Negative for dizziness, weakness and headaches.   Psychiatric/Behavioral: Negative for mood changes. The patient is not nervous/anxious.      All other ROS negative    OBJECTIVE:   There were no vitals taken for this visit.  BP (!) 152/87   Pulse 107   Temp 98  F (36.7  C) (Tympanic)   Ht 1.619 m (5' 3.75\")   Wt 90.7 kg (200 lb)   SpO2 95%   BMI 34.60 kg/m     Physical Exam  GENERAL: healthy, alert and no distress  EYES: Eyes grossly normal to inspection, " PERRL and conjunctivae and sclerae normal  HENT: ear canals and TM's normal, nose and mouth without ulcers or lesions  NECK: no adenopathy, no asymmetry, masses, or scars and thyroid normal to palpation  RESP: lungs clear to auscultation - no rales, rhonchi or wheezes  BREAST: normal without masses, tenderness or nipple discharge and no palpable axillary masses or adenopathy  CV: regular rate and rhythm, normal S1 S2, no S3 or S4, no murmur, click or rub, no peripheral edema and peripheral pulses strong  ABDOMEN: soft, nontender, no hepatosplenomegaly, no masses and bowel sounds normal   (male): patient deferred exam and rectal  MS: no gross musculoskeletal defects noted, no edema  SKIN: no suspicious lesions or rashes  SKIN: diffuse red macular rash dry on trunk.  NEURO: Normal strength and tone, mentation intact and speech normal  BACK: no CVA tenderness, no paralumbar tenderness  PSYCH: mentation appears normal, affect normal/bright  LYMPH: no cervical, supraclavicular, axillary, or inguinal adenopathy    ASSESSMENT/PLAN:   ASSESSMENT / PLAN:  (Z00.00) Routine general medical examination at a health care facility  (primary encounter diagnosis)  Comment: generally healthy and normal exam/labs  Plan: Reviewed self mole/testicle check handout.  vitaminD supplement and follow-up eye/dentist. Call/email with questions/concerns     (R21) Rash  Comment: fungal?  Plan: DERMATOLOGY ADULT REFERRAL, ketoconazole         (NIZORAL) 2 % external shampoo        Trail of nizoral. If helpful consider oral. Follow-up derm if not improving. Expected course and warning signs reviewed. 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 Weight loss/ diet.     (I10) Essential hypertension with goal blood pressure less than 140/90  Comment: needs help  Plan: amLODIPine (NORVASC) 10 MG tablet,          "lisinopril-hydrochlorothiazide (ZESTORETIC)         20-25 MG tablet        Will double zestoretic continue self-monitor. Chest pain to er. Exercise and weight loss. Recheck in 6 months      (J45.30) Mild persistent asthma without complication  Comment: stable  Plan: budesonide (PULMICORT FLEXHALER) 180 MCG/ACT         inhaler        Continue mdi's. Weight loss.        Patient has been advised of split billing requirements and indicates understanding: Yes  COUNSELING:   Reviewed preventive health counseling, as reflected in patient instructions       Regular exercise       Healthy diet/nutrition       Vision screening       Colon cancer screening       Prostate cancer screening       Osteoporosis prevention/bone health    Estimated body mass index is 36.49 kg/m  as calculated from the following:    Height as of 2/11/20: 1.6 m (5' 3\").    Weight as of 2/14/20: 93.4 kg (206 lb).     Weight management plan: Discussed healthy diet and exercise guidelines     He reports that he has never smoked. He has never used smokeless tobacco.      Counseling Resources:  ATP IV Guidelines  Pooled Cohorts Equation Calculator  FRAX Risk Assessment  ICSI Preventive Guidelines  Dietary Guidelines for Americans, 2010  USDA's MyPlate  ASA Prophylaxis  Lung CA Screening    Martin Howe MD  [facility]  "

## 2021-02-22 NOTE — LETTER
My Asthma Action Plan    Name: Cesar Foss   YOB: 1960  Date: 2/22/2021   My doctor: Martin Howe MD   My clinic: [facility]        My Control Medicine: Budesonide (Pulmicort Flexhaler) -  180 mcg 2  My Rescue Medicine: Albuterol (Proair/Ventolin/Proventil HFA) 2-4 puffs EVERY 4 HOURS as needed. Use a spacer if recommended by your provider.  My Oral Steroid Medicine: none My Asthma Severity:   Mild Persistent  Know your asthma triggers: upper respiratory infections, dust mites and pollens               GREEN ZONE   Good Control    I feel good    No cough or wheeze    Can work, sleep and play without asthma symptoms       Take your asthma control medicine every day.     1. If exercise triggers your asthma, take your rescue medication    15 minutes before exercise or sports, and    During exercise if you have asthma symptoms  2. Spacer to use with inhaler: If you have a spacer, make sure to use it with your inhaler             YELLOW ZONE Getting Worse  I have ANY of these:    I do not feel good    Cough or wheeze    Chest feels tight    Wake up at night   1. Keep taking your Green Zone medications  2. Start taking your rescue medicine:    every 20 minutes for up to 1 hour. Then every 4 hours for 24-48 hours.  3. If you stay in the Yellow Zone for more than 12-24 hours, contact your doctor.  4. If you do not return to the Green Zone in 12-24 hours or you get worse, start taking your oral steroid medicine if prescribed by your provider.           RED ZONE Medical Alert - Get Help  I have ANY of these:    I feel awful    Medicine is not helping    Breathing getting harder    Trouble walking or talking    Nose opens wide to breathe       1. Take your rescue medicine NOW  2. If your provider has prescribed an oral steroid medicine, start taking it NOW  3. Call your doctor NOW  4. If you are still in the Red Zone after 20 minutes and you have not reached your doctor:    Take  your rescue medicine again and    Call 911 or go to the emergency room right away    See your regular doctor within 2 weeks of an Emergency Room or Urgent Care visit for follow-up treatment.          Annual Reminders:  Meet with Asthma Educator,  Flu Shot in the Fall, consider Pneumonia Vaccination for patients with asthma (aged 19 and older).    Pharmacy: [id number] - [place], [place] - [address] [facility] [address] [facility]    Electronically signed by Martin Howe MD   Date: 02/22/21                      Asthma Triggers  How To Control Things That Make Your Asthma Worse    Triggers are things that make your asthma worse.  Look at the list below to help you find your triggers and what you can do about them.  You can help prevent asthma flare-ups by staying away from your triggers.      Trigger                                                          What you can do   Cigarette Smoke  Tobacco smoke can make asthma worse. Do not allow smoking in your home, car or around you.  Be sure no one smokes at a child s day care or school.  If you smoke, ask your health care provider for ways to help you quit.  Ask family members to quit too.  Ask your health care provider for a referral to Quit Plan to help you quit smoking, or call 8-511-552-PLAN.     Colds, Flu, Bronchitis  These are common triggers of asthma. Wash your hands often.  Don t touch your eyes, nose or mouth.  Get a flu shot every year.     Dust Mites  These are tiny bugs that live in cloth or carpet. They are too small to see. Wash sheets and blankets in hot water every week.   Encase pillows and mattress in dust mite proof covers.  Avoid having carpet if you can. If you have carpet, vacuum weekly.   Use a dust mask and HEPA vacuum.   Pollen and Outdoor Mold  Some people are allergic to trees, grass, or weed pollen, or molds. Try to keep your windows closed.  Limit time out doors when pollen count is high.   Ask you health care  provider about taking medicine during allergy season.     Animal Dander  Some people are allergic to skin flakes, urine or saliva from pets with fur or feathers. Keep pets with fur or feathers out of your home.    If you can t keep the pet outdoors, then keep the pet out of your bedroom.  Keep the bedroom door closed.  Keep pets off cloth furniture and away from stuffed toys.     Mice, Rats, and Cockroaches   Some people are allergic to the waste from these pests.   Cover food and garbage.  Clean up spills and food crumbs.  Store grease in the refrigerator.   Keep food out of the bedroom.   Indoor Mold  This can be a trigger if your home has high moisture. Fix leaking faucets, pipes, or other sources of water.   Clean moldy surfaces.  Dehumidify basement if it is damp and smelly.   Smoke, Strong Odors, and Sprays  These can reduce air quality. Stay away from strong odors and sprays, such as perfume, powder, hair spray, paints, smoke incense, paint, cleaning products, candles and new carpet.   Exercise or Sports  Some people with asthma have this trigger. Be active!  Ask your doctor about taking medicine before sports or exercise to prevent symptoms.    Warm up for 5-10 minutes before and after sports or exercise.     Other Triggers of Asthma  Cold air:  Cover your nose and mouth with a scarf.  Sometimes laughing or crying can be a trigger.  Some medicines and food can trigger asthma.

## 2021-02-23 ENCOUNTER — TELEPHONE (OUTPATIENT)
Dept: DERMATOLOGY | Facility: CLINIC | Age: 61
End: 2021-02-23

## 2021-02-23 ENCOUNTER — TELEPHONE (OUTPATIENT)
Dept: DERMATOLOGY | Facility: CLINIC | Age: 61
End: 2021-02-23
Payer: COMMERCIAL

## 2021-02-23 ASSESSMENT — ASTHMA QUESTIONNAIRES: ACT_TOTALSCORE: 20

## 2021-02-23 NOTE — TELEPHONE ENCOUNTER
Spoke with patient stated he will call us if symptoms do not improve with cream given by provider    Declined scheduling

## 2021-02-23 NOTE — TELEPHONE ENCOUNTER
[facility] Call Center    Phone Message    May a detailed message be left on voicemail: yes     Reason for Call: Appointment Intake    Referring Provider Name: Dr Martin Howe  Diagnosis and/or Symptoms: Rash    Action Taken: Message routed to:  Adult Clinics: Dermatology p [phone]    Travel Screening: Not Applicable

## 2021-02-28 DIAGNOSIS — Z11.59 ENCOUNTER FOR SCREENING FOR OTHER VIRAL DISEASES: ICD-10-CM

## 2021-03-10 ASSESSMENT — MIFFLIN-ST. JEOR: SCORE: 1647.37

## 2021-03-14 DIAGNOSIS — Z11.59 ENCOUNTER FOR SCREENING FOR OTHER VIRAL DISEASES: ICD-10-CM

## 2021-03-14 LAB
SARS-COV-2 RNA RESP QL NAA+PROBE: NORMAL
SPECIMEN SOURCE: NORMAL

## 2021-03-14 PROCEDURE — U0003 INFECTIOUS AGENT DETECTION BY NUCLEIC ACID (DNA OR RNA); SEVERE ACUTE RESPIRATORY SYNDROME CORONAVIRUS 2 (SARS-COV-2) (CORONAVIRUS DISEASE [COVID-19]), AMPLIFIED PROBE TECHNIQUE, MAKING USE OF HIGH THROUGHPUT TECHNOLOGIES AS DESCRIBED BY CMS-2020-01-R: HCPCS | Performed by: SURGERY

## 2021-03-14 PROCEDURE — U0005 INFEC AGEN DETEC AMPLI PROBE: HCPCS | Performed by: SURGERY

## 2021-03-15 LAB
LABORATORY COMMENT REPORT: NORMAL
SARS-COV-2 RNA RESP QL NAA+PROBE: NEGATIVE
SPECIMEN SOURCE: NORMAL

## 2021-03-17 ENCOUNTER — HOSPITAL ENCOUNTER (OUTPATIENT)
Facility: AMBULATORY SURGERY CENTER | Age: 61
Discharge: HOME | End: 2021-03-17
Attending: SURGERY | Admitting: INTERNAL MEDICINE
Payer: COMMERCIAL

## 2021-03-17 VITALS
HEART RATE: 101 BPM | SYSTOLIC BLOOD PRESSURE: 120 MMHG | OXYGEN SATURATION: 95 % | DIASTOLIC BLOOD PRESSURE: 74 MMHG | TEMPERATURE: 97.5 F | RESPIRATION RATE: 16 BRPM | WEIGHT: 204 LBS | BODY MASS INDEX: 34.83 KG/M2 | HEIGHT: 64 IN

## 2021-03-17 LAB — UPPER GI ENDOSCOPY: NORMAL

## 2021-03-17 PROCEDURE — 43239 EGD BIOPSY SINGLE/MULTIPLE: CPT | Mod: XU

## 2021-03-17 PROCEDURE — 43249 ESOPH EGD DILATION <30 MM: CPT

## 2021-03-17 PROCEDURE — G8907 PT DOC NO EVENTS ON DISCHARG: HCPCS

## 2021-03-17 PROCEDURE — 88312 SPECIAL STAINS GROUP 1: CPT | Mod: GC | Performed by: PATHOLOGY

## 2021-03-17 PROCEDURE — G8918 PT W/O PREOP ORDER IV AB PRO: HCPCS

## 2021-03-17 PROCEDURE — 88305 TISSUE EXAM BY PATHOLOGIST: CPT | Mod: GC | Performed by: PATHOLOGY

## 2021-03-17 RX ORDER — FLUMAZENIL 0.1 MG/ML
0.2 INJECTION, SOLUTION INTRAVENOUS
Status: ACTIVE | OUTPATIENT
Start: 2021-03-17 | End: 2021-03-17

## 2021-03-17 RX ORDER — FENTANYL CITRATE 50 UG/ML
INJECTION, SOLUTION INTRAMUSCULAR; INTRAVENOUS PRN
Status: DISCONTINUED | OUTPATIENT
Start: 2021-03-17 | End: 2021-03-17 | Stop reason: HOSPADM

## 2021-03-17 RX ORDER — ONDANSETRON 2 MG/ML
4 INJECTION INTRAMUSCULAR; INTRAVENOUS
Status: COMPLETED | OUTPATIENT
Start: 2021-03-17 | End: 2021-03-17

## 2021-03-17 RX ORDER — LIDOCAINE 40 MG/G
CREAM TOPICAL
Status: DISCONTINUED | OUTPATIENT
Start: 2021-03-17 | End: 2021-03-18 | Stop reason: HOSPADM

## 2021-03-17 RX ORDER — ONDANSETRON 2 MG/ML
4 INJECTION INTRAMUSCULAR; INTRAVENOUS
Status: DISCONTINUED | OUTPATIENT
Start: 2021-03-17 | End: 2021-03-18 | Stop reason: HOSPADM

## 2021-03-17 RX ORDER — ONDANSETRON 2 MG/ML
4 INJECTION INTRAMUSCULAR; INTRAVENOUS EVERY 6 HOURS PRN
Status: DISCONTINUED | OUTPATIENT
Start: 2021-03-17 | End: 2021-03-18 | Stop reason: HOSPADM

## 2021-03-17 RX ORDER — PROCHLORPERAZINE MALEATE 10 MG
10 TABLET ORAL EVERY 6 HOURS PRN
Status: DISCONTINUED | OUTPATIENT
Start: 2021-03-17 | End: 2021-03-18 | Stop reason: HOSPADM

## 2021-03-17 RX ORDER — ONDANSETRON 4 MG/1
4 TABLET, ORALLY DISINTEGRATING ORAL EVERY 6 HOURS PRN
Status: DISCONTINUED | OUTPATIENT
Start: 2021-03-17 | End: 2021-03-18 | Stop reason: HOSPADM

## 2021-03-17 RX ORDER — NALOXONE HYDROCHLORIDE 0.4 MG/ML
0.2 INJECTION, SOLUTION INTRAMUSCULAR; INTRAVENOUS; SUBCUTANEOUS
Status: DISCONTINUED | OUTPATIENT
Start: 2021-03-17 | End: 2021-03-18 | Stop reason: HOSPADM

## 2021-03-17 RX ORDER — NALOXONE HYDROCHLORIDE 0.4 MG/ML
0.4 INJECTION, SOLUTION INTRAMUSCULAR; INTRAVENOUS; SUBCUTANEOUS
Status: DISCONTINUED | OUTPATIENT
Start: 2021-03-17 | End: 2021-03-18 | Stop reason: HOSPADM

## 2021-03-22 ENCOUNTER — MYC MEDICAL ADVICE (OUTPATIENT)
Dept: FAMILY MEDICINE | Facility: CLINIC | Age: 61
End: 2021-03-22

## 2021-03-22 ENCOUNTER — TELEPHONE (OUTPATIENT)
Dept: GASTROENTEROLOGY | Facility: CLINIC | Age: 61
End: 2021-03-22

## 2021-03-22 ENCOUNTER — MEDICAL CORRESPONDENCE (OUTPATIENT)
Dept: HEALTH INFORMATION MANAGEMENT | Facility: CLINIC | Age: 61
End: 2021-03-22

## 2021-03-22 DIAGNOSIS — K22.70 BARRETT'S ESOPHAGUS: Primary | ICD-10-CM

## 2021-03-22 DIAGNOSIS — G47.33 OSA (OBSTRUCTIVE SLEEP APNEA): Primary | ICD-10-CM

## 2021-03-22 NOTE — TELEPHONE ENCOUNTER
LPN spoke with patient and notified him that per Dr. Verito collins message regarding pathology results, he should take Omeprazole 20 mg once daily. Patient is requesting a prescription be sent to [facility] pharmacy in [place], [place].     Neena Lopez LPN

## 2021-03-22 NOTE — TELEPHONE ENCOUNTER
[facility] Call Center    Phone Message    May a detailed message be left on voicemail: yes     Reason for Call:  Patient request a call back to discuss which medication he should continue taking after procedure. Please call back to advise.      Action Taken: Message routed to:  Adult Clinics: Gastroenterology (GI) p [id number]    Travel Screening: Not Applicable

## 2021-03-23 LAB — COPATH REPORT: NORMAL

## 2021-03-23 NOTE — TELEPHONE ENCOUNTER
Called the patient @ [phone]. Per patient request I have placed the DME prescription for CPAP supplies at the [facility] .  Ania Ca TC  
Patient picked up from  3/23/21. Provided ID and verified .  
Pended DME order to print/ sign.    Esperanza Gibbs BSN, RN    
done  
0

## 2021-03-23 NOTE — TELEPHONE ENCOUNTER
Rx sent to pharmacy per provider order/result note.  My Chart message to patient advising it has been sent.    Heidi De Oliveira RN

## 2021-09-08 ENCOUNTER — TRANSFERRED RECORDS (OUTPATIENT)
Dept: HEALTH INFORMATION MANAGEMENT | Facility: CLINIC | Age: 61
End: 2021-09-08

## 2021-09-17 DIAGNOSIS — E78.5 HYPERLIPIDEMIA LDL GOAL <130: ICD-10-CM

## 2021-09-17 DIAGNOSIS — I10 ESSENTIAL HYPERTENSION WITH GOAL BLOOD PRESSURE LESS THAN 140/90: ICD-10-CM

## 2021-09-17 RX ORDER — LISINOPRIL AND HYDROCHLOROTHIAZIDE 20; 25 MG/1; MG/1
1 TABLET ORAL DAILY
Qty: 90 TABLET | Refills: 0 | Status: SHIPPED | OUTPATIENT
Start: 2021-09-17 | End: 2021-12-13

## 2021-09-17 RX ORDER — SIMVASTATIN 10 MG
TABLET ORAL
Qty: 90 TABLET | Refills: 1 | Status: SHIPPED | OUTPATIENT
Start: 2021-09-17 | End: 2022-03-15

## 2021-09-17 RX ORDER — AMLODIPINE BESYLATE 10 MG/1
TABLET ORAL
Qty: 90 TABLET | Refills: 1 | Status: SHIPPED | OUTPATIENT
Start: 2021-09-17 | End: 2022-03-14

## 2021-09-17 NOTE — LETTER
September 17, 2021    Cesar Reinaerstamarilyn  [address] [facility] [id number]    Dear Cesar,       We recently received a refill request for lisinopril-hydrochlorothiazide and amlodipine.  We have refilled this for a one time 90 day supply only because you are due for a:    Blood pressure/medication check office visit-needed in the next month per Dr Howe.      Please call at your earliest convenience so that there will not be a delay with your future refills.          Thank you,   Your [facility] Team/  [phone]

## 2021-09-25 ENCOUNTER — HEALTH MAINTENANCE LETTER (OUTPATIENT)
Age: 61
End: 2021-09-25

## 2021-12-06 ENCOUNTER — MYC MEDICAL ADVICE (OUTPATIENT)
Dept: FAMILY MEDICINE | Facility: CLINIC | Age: 61
End: 2021-12-06
Payer: COMMERCIAL

## 2021-12-06 DIAGNOSIS — D69.6 THROMBOCYTOPENIA (H): ICD-10-CM

## 2021-12-06 DIAGNOSIS — I10 ESSENTIAL HYPERTENSION WITH GOAL BLOOD PRESSURE LESS THAN 140/90: ICD-10-CM

## 2021-12-06 DIAGNOSIS — R73.9 HYPERGLYCEMIA: ICD-10-CM

## 2021-12-06 DIAGNOSIS — E78.5 HYPERLIPIDEMIA LDL GOAL <130: Primary | ICD-10-CM

## 2021-12-07 NOTE — TELEPHONE ENCOUNTER
If patient can self-monitor blood pressure I can do a video (not telephone) visit if desired. Martin Howe MD previsit fasting labs ordered.

## 2021-12-07 NOTE — TELEPHONE ENCOUNTER
Please advise, can you do his 1/27/22 visit by video? He wants to know if he has to come in.Megan Hoff MA/AMANDA

## 2021-12-13 DIAGNOSIS — I10 ESSENTIAL HYPERTENSION WITH GOAL BLOOD PRESSURE LESS THAN 140/90: ICD-10-CM

## 2021-12-13 RX ORDER — LISINOPRIL AND HYDROCHLOROTHIAZIDE 20; 25 MG/1; MG/1
1 TABLET ORAL DAILY
Qty: 90 TABLET | Refills: 0 | Status: SHIPPED | OUTPATIENT
Start: 2021-12-13 | End: 2022-03-07

## 2021-12-21 ENCOUNTER — LAB (OUTPATIENT)
Dept: LAB | Facility: CLINIC | Age: 61
End: 2021-12-21
Payer: COMMERCIAL

## 2021-12-21 ENCOUNTER — DOCUMENTATION ONLY (OUTPATIENT)
Dept: LAB | Facility: CLINIC | Age: 61
End: 2021-12-21

## 2021-12-21 DIAGNOSIS — I10 ESSENTIAL HYPERTENSION WITH GOAL BLOOD PRESSURE LESS THAN 140/90: ICD-10-CM

## 2021-12-21 DIAGNOSIS — R73.9 HYPERGLYCEMIA: ICD-10-CM

## 2021-12-21 DIAGNOSIS — D69.6 THROMBOCYTOPENIA (H): ICD-10-CM

## 2021-12-21 DIAGNOSIS — E78.5 HYPERLIPIDEMIA LDL GOAL <130: ICD-10-CM

## 2021-12-21 LAB
ALBUMIN SERPL-MCNC: 4.2 G/DL (ref 3.4–5)
ANION GAP SERPL CALCULATED.3IONS-SCNC: 6 MMOL/L (ref 3–14)
BUN SERPL-MCNC: 10 MG/DL (ref 7–30)
CALCIUM SERPL-MCNC: 9.8 MG/DL (ref 8.5–10.1)
CHLORIDE BLD-SCNC: 102 MMOL/L (ref 94–109)
CHOLEST SERPL-MCNC: 162 MG/DL
CO2 SERPL-SCNC: 28 MMOL/L (ref 20–32)
CREAT SERPL-MCNC: 0.92 MG/DL (ref 0.66–1.25)
ERYTHROCYTE [DISTWIDTH] IN BLOOD BY AUTOMATED COUNT: 12.2 % (ref 10–15)
FASTING STATUS PATIENT QL REPORTED: YES
GFR SERPL CREATININE-BSD FRML MDRD: >90 ML/MIN/1.73M2
GLUCOSE BLD-MCNC: 116 MG/DL (ref 70–99)
HBA1C MFR BLD: 5.4 % (ref 0–5.6)
HCT VFR BLD AUTO: 41.2 % (ref 40–53)
HDLC SERPL-MCNC: 50 MG/DL
HGB BLD-MCNC: 14.9 G/DL (ref 13.3–17.7)
HOLD SPECIMEN: NORMAL
LDLC SERPL CALC-MCNC: 76 MG/DL
MCH RBC QN AUTO: 35.9 PG (ref 26.5–33)
MCHC RBC AUTO-ENTMCNC: 36.2 G/DL (ref 31.5–36.5)
MCV RBC AUTO: 99 FL (ref 78–100)
NONHDLC SERPL-MCNC: 112 MG/DL
PHOSPHATE SERPL-MCNC: 2.8 MG/DL (ref 2.5–4.5)
PLATELET # BLD AUTO: 84 10E3/UL (ref 150–450)
POTASSIUM BLD-SCNC: 3.6 MMOL/L (ref 3.4–5.3)
RBC # BLD AUTO: 4.15 10E6/UL (ref 4.4–5.9)
SODIUM SERPL-SCNC: 136 MMOL/L (ref 133–144)
TRIGL SERPL-MCNC: 178 MG/DL
WBC # BLD AUTO: 5.6 10E3/UL (ref 4–11)

## 2021-12-21 PROCEDURE — 83036 HEMOGLOBIN GLYCOSYLATED A1C: CPT

## 2021-12-21 PROCEDURE — 80061 LIPID PANEL: CPT

## 2021-12-21 PROCEDURE — 85027 COMPLETE CBC AUTOMATED: CPT

## 2021-12-21 PROCEDURE — 80069 RENAL FUNCTION PANEL: CPT

## 2021-12-21 PROCEDURE — 36415 COLL VENOUS BLD VENIPUNCTURE: CPT

## 2021-12-21 NOTE — PROGRESS NOTES
Patient came in to [facility] for pre visit bloodwork, 12/21/2021.  He was wondering if a PSA is needed. Please review and place add-on if needed.     Thank you  Merle Ashley MLT ([facility])

## 2022-01-27 ENCOUNTER — OFFICE VISIT (OUTPATIENT)
Dept: FAMILY MEDICINE | Facility: CLINIC | Age: 62
End: 2022-01-27
Payer: COMMERCIAL

## 2022-01-27 VITALS
TEMPERATURE: 98 F | HEART RATE: 121 BPM | WEIGHT: 190 LBS | OXYGEN SATURATION: 97 % | BODY MASS INDEX: 32.87 KG/M2 | DIASTOLIC BLOOD PRESSURE: 60 MMHG | SYSTOLIC BLOOD PRESSURE: 122 MMHG | RESPIRATION RATE: 20 BRPM

## 2022-01-27 DIAGNOSIS — I10 ESSENTIAL HYPERTENSION WITH GOAL BLOOD PRESSURE LESS THAN 140/90: ICD-10-CM

## 2022-01-27 DIAGNOSIS — E78.5 HYPERLIPIDEMIA LDL GOAL <130: ICD-10-CM

## 2022-01-27 DIAGNOSIS — D69.6 THROMBOCYTOPENIA (H): Primary | ICD-10-CM

## 2022-01-27 DIAGNOSIS — G47.33 OSA (OBSTRUCTIVE SLEEP APNEA): ICD-10-CM

## 2022-01-27 DIAGNOSIS — Z12.5 SCREENING PSA (PROSTATE SPECIFIC ANTIGEN): ICD-10-CM

## 2022-01-27 PROCEDURE — 99214 OFFICE O/P EST MOD 30 MIN: CPT | Performed by: FAMILY MEDICINE

## 2022-01-27 ASSESSMENT — ASTHMA QUESTIONNAIRES
QUESTION_2 LAST FOUR WEEKS HOW OFTEN HAVE YOU HAD SHORTNESS OF BREATH: ONCE OR TWICE A WEEK
ACT_TOTALSCORE: 22
QUESTION_1 LAST FOUR WEEKS HOW MUCH OF THE TIME DID YOUR ASTHMA KEEP YOU FROM GETTING AS MUCH DONE AT WORK, SCHOOL OR AT HOME: NONE OF THE TIME
ACT_TOTALSCORE: 22
QUESTION_5 LAST FOUR WEEKS HOW WOULD YOU RATE YOUR ASTHMA CONTROL: WELL CONTROLLED
QUESTION_3 LAST FOUR WEEKS HOW OFTEN DID YOUR ASTHMA SYMPTOMS (WHEEZING, COUGHING, SHORTNESS OF BREATH, CHEST TIGHTNESS OR PAIN) WAKE YOU UP AT NIGHT OR EARLIER THAN USUAL IN THE MORNING: NOT AT ALL
QUESTION_4 LAST FOUR WEEKS HOW OFTEN HAVE YOU USED YOUR RESCUE INHALER OR NEBULIZER MEDICATION (SUCH AS ALBUTEROL): ONCE A WEEK OR LESS

## 2022-01-27 NOTE — PROGRESS NOTES
SUBJECTIVE:  Cesar Foss, a 61 year old male scheduled an appointment to discuss the following issues:  Follow-up htn, gerd/barrets, high cholesterol, hyperglycemia, fatty liver, obesity, low platelets and asthma.egd last spring - repeat in 2 years.   Ibuprofen 600mg every other day. Asa - not regularly.   ALCOHOL  - daily 2/night.   No urine changes or hematuria.   No nausea, vomiting or diarrhea or constipation or black/bloody stools.   No abdominal pain. gerd - stable.   Emotionally doing ok. Watching carbs and portions.   Asthma stable. No prednisone needed.   Taking wellbutrin - helpful with weight and some energy.   No chest pain. Sleep - difficult with cpap.   Medical, social, surgical, and family histories reviewed.    ROS:  All other ROS negative.  OBJECTIVE:  /60   Pulse (!) 121   Temp 98  F (36.7  C) (Tympanic)   Resp 20   Wt 86.2 kg (190 lb)   SpO2 97%   BMI 32.87 kg/m    EXAM:  GENERAL APPEARANCE: healthy, alert and no distress  EYES: EOMI,  PERRL  HENT: ear canals and TM's normal and nose and mouth without ulcers or lesions  NECK: no adenopathy, no asymmetry, masses, or scars and thyroid normal to palpation  RESP: lungs clear to auscultation - no rales, rhonchi or wheezes  CV: regular rates and rhythm, normal S1 S2, no S3 or S4 and no murmur, click or rub -  ABDOMEN:  soft, nontender, no HSM or masses and bowel sounds normal  MS: extremities normal- no gross deformities noted, no evidence of inflammation in joints, FROM in all extremities.  NEURO: Normal strength and tone, sensory exam grossly normal, mentation intact and speech normal  PSYCH: mentation appears normal and affect normal/bright    ASSESSMENT / PLAN:  (D69.6) Thrombocytopenia (H)  (primary encounter diagnosis)  Comment: worse. Likely from ALCOHOL or barretts or ?  Plan: Hepatic function panel, CBC with platelets and         differential        Decrease ALCOHOL and nsaids. Hematology if worse. Repeat labs in 1 month. Return  to clinic sooner if bleeding issues. Call/email with questions/concerns     (Z12.5) Screening PSA (prostate specific antigen)  Plan: Prostate Specific Antigen Screen            (G47.33) OREN (obstructive sleep apnea)  Comment: needs help  Plan: SLEEP EVALUATION & MANAGEMENT REFERRAL - ADULT         -        Decrease ALCOHOL and especially don't drink everynight. Patient would like follow-up sleep specialist if not improving. exercise    (I10) Essential hypertension with goal blood pressure less than 140/90  Comment: stable  Plan: continue meds    (E78.5) Hyperlipidemia LDL goal <130  Comment: stable  Plan: continue weight loss/low carb diet. Decrease ALCOHOL     (K22.70) Haney's esophagus  Comment: stable  Plan: pr GI. Repeat in 2 years. Limit ALCOHOL and nsaids.       Martin Howe MD

## 2022-03-01 ENCOUNTER — LAB (OUTPATIENT)
Dept: LAB | Facility: CLINIC | Age: 62
End: 2022-03-01
Payer: COMMERCIAL

## 2022-03-01 DIAGNOSIS — D69.6 THROMBOCYTOPENIA (H): ICD-10-CM

## 2022-03-01 DIAGNOSIS — Z12.5 SCREENING PSA (PROSTATE SPECIFIC ANTIGEN): ICD-10-CM

## 2022-03-01 LAB
ALBUMIN SERPL-MCNC: 4.1 G/DL (ref 3.4–5)
ALP SERPL-CCNC: 53 U/L (ref 40–150)
ALT SERPL W P-5'-P-CCNC: 67 U/L (ref 0–70)
AST SERPL W P-5'-P-CCNC: 67 U/L (ref 0–45)
BASOPHILS # BLD AUTO: 0.1 10E3/UL (ref 0–0.2)
BASOPHILS NFR BLD AUTO: 1 %
BILIRUB DIRECT SERPL-MCNC: 0.2 MG/DL (ref 0–0.2)
BILIRUB SERPL-MCNC: 0.6 MG/DL (ref 0.2–1.3)
EOSINOPHIL # BLD AUTO: 0.1 10E3/UL (ref 0–0.7)
EOSINOPHIL NFR BLD AUTO: 1 %
ERYTHROCYTE [DISTWIDTH] IN BLOOD BY AUTOMATED COUNT: 12 % (ref 10–15)
HCT VFR BLD AUTO: 41.2 % (ref 40–53)
HGB BLD-MCNC: 15 G/DL (ref 13.3–17.7)
LYMPHOCYTES # BLD AUTO: 2.2 10E3/UL (ref 0.8–5.3)
LYMPHOCYTES NFR BLD AUTO: 25 %
MCH RBC QN AUTO: 35.7 PG (ref 26.5–33)
MCHC RBC AUTO-ENTMCNC: 36.4 G/DL (ref 31.5–36.5)
MCV RBC AUTO: 98 FL (ref 78–100)
MONOCYTES # BLD AUTO: 0.6 10E3/UL (ref 0–1.3)
MONOCYTES NFR BLD AUTO: 8 %
NEUTROPHILS # BLD AUTO: 5.6 10E3/UL (ref 1.6–8.3)
NEUTROPHILS NFR BLD AUTO: 66 %
PLATELET # BLD AUTO: 127 10E3/UL (ref 150–450)
PROT SERPL-MCNC: 7.8 G/DL (ref 6.8–8.8)
PSA SERPL-MCNC: 0.91 UG/L (ref 0–4)
RBC # BLD AUTO: 4.2 10E6/UL (ref 4.4–5.9)
WBC # BLD AUTO: 8.5 10E3/UL (ref 4–11)

## 2022-03-01 PROCEDURE — 85025 COMPLETE CBC W/AUTO DIFF WBC: CPT

## 2022-03-01 PROCEDURE — 80076 HEPATIC FUNCTION PANEL: CPT

## 2022-03-01 PROCEDURE — 36415 COLL VENOUS BLD VENIPUNCTURE: CPT

## 2022-03-01 PROCEDURE — G0103 PSA SCREENING: HCPCS

## 2022-03-12 DIAGNOSIS — I10 ESSENTIAL HYPERTENSION WITH GOAL BLOOD PRESSURE LESS THAN 140/90: ICD-10-CM

## 2022-03-14 RX ORDER — AMLODIPINE BESYLATE 10 MG/1
TABLET ORAL
Qty: 90 TABLET | Refills: 0 | Status: SHIPPED | OUTPATIENT
Start: 2022-03-14 | End: 2022-04-08

## 2022-03-15 DIAGNOSIS — E78.5 HYPERLIPIDEMIA LDL GOAL <130: ICD-10-CM

## 2022-03-15 RX ORDER — SIMVASTATIN 10 MG
TABLET ORAL
Qty: 90 TABLET | Refills: 1 | Status: SHIPPED | OUTPATIENT
Start: 2022-03-15 | End: 2022-09-13

## 2022-03-15 NOTE — TELEPHONE ENCOUNTER
When trying to sign the Rx the warning below appeared.  Please sign Prescription(s) if appropriate.  Thank you. Cari Martinez R.N.

## 2022-03-22 DIAGNOSIS — K22.70 BARRETT'S ESOPHAGUS: ICD-10-CM

## 2022-03-23 NOTE — TELEPHONE ENCOUNTER
omeprazole (PRILOSEC) 20 MG DR capsule      Last Written Prescription Date:  3/23/21  Last Fill Quantity: 90,   # refills: 3  Last Office Visit : has not had an office visit  Last procedure 3/17/21  Future Office visit:  None scheduled    Routing refill request to provider for review/approval because:  Not seen in clinic

## 2022-04-07 PROBLEM — Z71.89 ADVANCED DIRECTIVES, COUNSELING/DISCUSSION: Chronic | Status: ACTIVE | Noted: 2017-06-06

## 2022-04-07 PROBLEM — E66.01 MORBID OBESITY (H): Chronic | Status: ACTIVE | Noted: 2020-02-11

## 2022-04-07 PROBLEM — Z98.890 S/P ARTHROSCOPY OF LEFT KNEE: Status: RESOLVED | Noted: 2017-08-01 | Resolved: 2022-04-07

## 2022-04-07 PROBLEM — D69.6 THROMBOCYTOPENIA (H): Chronic | Status: ACTIVE | Noted: 2017-07-11

## 2022-04-07 PROBLEM — Z98.84 STATUS POST BARIATRIC SURGERY: Chronic | Status: ACTIVE | Noted: 2022-04-07

## 2022-04-07 PROBLEM — Z98.84 STATUS POST BARIATRIC SURGERY: Status: ACTIVE | Noted: 2022-04-07

## 2022-04-07 PROBLEM — M94.261 CHONDROMALACIA, KNEE, RIGHT: Status: ACTIVE | Noted: 2017-08-01

## 2022-04-07 PROBLEM — M94.262 CHONDROMALACIA, KNEE, LEFT: Status: ACTIVE | Noted: 2017-06-13

## 2022-04-07 PROBLEM — E78.5 HYPERLIPIDEMIA LDL GOAL <130: Chronic | Status: ACTIVE | Noted: 2017-06-06

## 2022-04-07 PROBLEM — Z71.89 ADVANCED DIRECTIVES, COUNSELING/DISCUSSION: Status: ACTIVE | Noted: 2017-06-06

## 2022-04-07 PROBLEM — K22.70 BARRETT ESOPHAGUS: Chronic | Status: ACTIVE | Noted: 2022-04-07

## 2022-04-11 ENCOUNTER — VIRTUAL VISIT (OUTPATIENT)
Dept: SLEEP MEDICINE | Facility: CLINIC | Age: 62
End: 2022-04-11
Attending: FAMILY MEDICINE
Payer: COMMERCIAL

## 2022-04-11 VITALS — WEIGHT: 198 LBS | HEIGHT: 63 IN | BODY MASS INDEX: 35.08 KG/M2

## 2022-04-11 DIAGNOSIS — K22.70 BARRETT'S ESOPHAGUS: ICD-10-CM

## 2022-04-11 DIAGNOSIS — G47.33 OSA (OBSTRUCTIVE SLEEP APNEA): Primary | ICD-10-CM

## 2022-04-11 DIAGNOSIS — I10 ESSENTIAL HYPERTENSION WITH GOAL BLOOD PRESSURE LESS THAN 140/90: Chronic | ICD-10-CM

## 2022-04-11 DIAGNOSIS — E66.01 MORBID OBESITY (H): Chronic | ICD-10-CM

## 2022-04-11 PROCEDURE — 99204 OFFICE O/P NEW MOD 45 MIN: CPT | Mod: GT | Performed by: INTERNAL MEDICINE

## 2022-04-11 ASSESSMENT — SLEEP AND FATIGUE QUESTIONNAIRES
HOW LIKELY ARE YOU TO NOD OFF OR FALL ASLEEP WHEN YOU ARE A PASSENGER IN A CAR FOR AN HOUR WITHOUT A BREAK: HIGH CHANCE OF DOZING
HOW LIKELY ARE YOU TO NOD OFF OR FALL ASLEEP WHILE SITTING AND TALKING TO SOMEONE: MODERATE CHANCE OF DOZING
HOW LIKELY ARE YOU TO NOD OFF OR FALL ASLEEP IN A CAR, WHILE STOPPED FOR A FEW MINUTES IN TRAFFIC: WOULD NEVER DOZE
HOW LIKELY ARE YOU TO NOD OFF OR FALL ASLEEP WHILE SITTING QUIETLY AFTER LUNCH WITHOUT ALCOHOL: HIGH CHANCE OF DOZING
HOW LIKELY ARE YOU TO NOD OFF OR FALL ASLEEP WHILE WATCHING TV: HIGH CHANCE OF DOZING
HOW LIKELY ARE YOU TO NOD OFF OR FALL ASLEEP WHILE SITTING AND READING: MODERATE CHANCE OF DOZING
HOW LIKELY ARE YOU TO NOD OFF OR FALL ASLEEP WHILE SITTING INACTIVE IN A PUBLIC PLACE: MODERATE CHANCE OF DOZING
HOW LIKELY ARE YOU TO NOD OFF OR FALL ASLEEP WHILE LYING DOWN TO REST IN THE AFTERNOON WHEN CIRCUMSTANCES PERMIT: HIGH CHANCE OF DOZING

## 2022-04-11 ASSESSMENT — PAIN SCALES - GENERAL: PAINLEVEL: NO PAIN (0)

## 2022-04-11 NOTE — PROGRESS NOTES
Cesar is a 62 year old who is being evaluated via a billable video visit.      How would you like to obtain your AVS? MyChart  If the video visit is dropped, the invitation should be resent by: Send to e-mail at: [email or web address]  Will anyone else be joining your video visit? Colleen       Mary Kate Waters    Video Start Time: 7:33 AM    Video-Visit Details    Type of service:  Video Visit    Video End Time:8:06 AM    Originating Location (pt. Location): Home    Distant Location (provider location):  [facility]     Platform used for Video Visit: [facility]       Outpatient Sleep Medicine Consultation:  April 11, 2022    Name: Cesar Foss MRN# [id number]   Age: 62 year old YOB: 1960     Date of Consultation: April 11, 2022  Consultation is requested by: Martin Howe MD  [address] [place] [address] Martin Howe  Primary care provider: Martin Howe         Reason for Sleep Consult:     Cesar Foss is sent by Martin Howe for a sleep consultation regarding obstructive sleep apnea  .    Chief Complaint   Patient presents with     Inspire     Pt also state he has a bad cord on his machine.                Assessment and Plan:     Severe obstructive sleep apnea by sleep study 2005, weight reportedly stable since then, tolerating CPAP well, symptoms of recurrant excessive daytime sleepiness (ESS 18), restless sleep, morning headaches, night sweats, breakthrough snoring  - Get sleep study from [facility]  - Increase pressures to 15-18 cmH20  - Excessive daytime sleepiness may be related to insufficient sleep time, recommended increase total sleep time by 1 hour, goal 8.5- 9 hours for 6-8 weeks  - Consider titration study if excessive daytime sleepiness persists     I spent 30 minutes with patient including counseling, and 15 minutes with chart review, and documentation       CC: Martin Howe          History of Present Illness:     Past  Sleep Evaluations:    [facility], '[employer]', [facility] to switch [facility]    Patient was initially seen in 2005 at [facility] with loud snoring loudly and, awakening with gasping and choking and shortness of breath, un-refreshing sleep (ESS 12).     Polysomnography reportedly showed severe obstructive sleep apnea with an  apnea-hypopnea index of 97 per hour with desaturations occurring to a low  of 80%. CPAP to 14 cm water pressure decreased apnea-hypopnea index to 4.6 per  hour.     He was started on CPAP 15 cmH20    Overall, the patient rates their experience with PAP as good     Its a hassle for travel, he gets stripes on his face  It 'leaks' in machine or mask    Patient uses nasal mask.      ResMed   CPAP 15 cmH2O download:  89/90 total days of use. 1 nonuse days. 88 days with >4 hours use.  Average use 7 hours 19 minutes per day. Median Leak 14 L/min. 95%ile Leak 33 L/min. AHI 0.3.     Machine is about 3-4 years old        SLEEP-WAKE SCHEDULE:     Work/School Days: Patient goes to school/work: Yes  Usually gets into bed at  8  Takes patient <10 to fall asleep  Has no trouble falling asleep   Wakes up in the middle of the night 2-3  times.  He has no trouble falling back asleep    Patient is usually up at 4 AM  Uses alarm:  No    Weekends/Non-work Days/All Other Days:  Usually gets into bed at   8-9  Patient is usually up at  5-6      Cesar A Fjerstad prefers to sleep in this position(s): back    Patient states they do the following activities in bed:  TV is on until fiance comes to bed    Naps  Patient takes a purposeful nap 3-4  times a week on CPAP and naps are usually 15-30 minutes in duration  He dozes off unintentionally 5 days per week  Patient has had a driving accident or near-miss due to sleepiness/drowsiness:  No      SLEEP DISRUPTIONS:    Breathing/Snoring  Patient snores: Occasional on CPAP  He has issues with the following:  Restless sleep, morning headaches  2/week    Movement:  Patient gets pain, discomfort, with an urge to move: No    Patient has been told he kicks his legs at night:  No      Behaviors in Sleep:  Cesar Foss has experienced the following behaviors while sleeping:    No dream enactment      CAFFEINE AND OTHER SUBSTANCES:    Patient consumes caffeinated beverages per day:   2 sodas/day            SCALES:    EPWORTH SLEEPINESS SCALE      Peetz Sleepiness Scale ( MITZI Gamez  1990-1997[facility] [place]/English - Final version - 21 Nov 07 - [facility] [employer].) 4/11/2022   Sitting and reading Moderate chance of dozing   Watching TV High chance of dozing   Sitting, inactive in a public place (e.g. a theatre or a meeting) Moderate chance of dozing   As a passenger in a car for an hour without a break High chance of dozing   Lying down to rest in the afternoon when circumstances permit High chance of dozing   Sitting and talking to someone Moderate chance of dozing   Sitting quietly after a lunch without alcohol High chance of dozing   In a car, while stopped for a few minutes in traffic Would never doze   Peetz Score (MC) 0   Peetz Score (Sleep) 18         INSOMNIA SEVERITY INDEX (LISA)      Insomnia Severity Index (LISA) 4/11/2022   Difficulty falling asleep 0   Difficulty staying asleep 2   Problems waking up too early 2   How SATISFIED/DISSATISFIED are you with your CURRENT sleep pattern? 2   How NOTICEABLE to others do you think your sleep problem is in terms of impairing the quality of your life? 4   How WORRIED/DISTRESSED are you about your current sleep problem? 3   To what extent do you consider your sleep problem to INTERFERE with your daily functioning (e.g. daytime fatigue, mood, ability to function at work/daily chores, concentration, memory, mood, etc.) CURRENTLY? 3   LISA Total Score 16       Guidelines for Scoring/Interpretation:  Total score categories:  0-7 = No clinically significant insomnia   8-14 = Subthreshold insomnia   15-21 =  Clinical insomnia (moderate severity)  22-28 = Clinical insomnia (severe)    Used via courtesy of www.myhealth.va.gov with permission from Hema Johnson PhD., [facility]              Allergies:    No Known Allergies    Medications:    Current Outpatient Medications   Medication Sig Dispense Refill     albuterol (PROAIR HFA) 108 (90 Base) MCG/ACT inhaler INHALE 2 PUFFS INTO THE LUNGS EVERY 6 HOURS AS NEEDED FOR SHORTNESS OF BREATH / DYSPNEA OR WHEEZING 17 Inhaler 1     amLODIPine (NORVASC) 10 MG tablet TAKE 1 TABLET BY MOUTH FOR BLOOD PRESSURE IN THE PM 90 tablet 1     budesonide (PULMICORT FLEXHALER) 180 MCG/ACT inhaler Inhale 1 puff into the lungs 2 times daily Replaces flovent for insurance 3 each 1     buPROPion (WELLBUTRIN SR) 150 MG 12 hr tablet TAKE 1 TABLET ORALLY EVERY MORNING FOR 7 DAYS, THEN TAKE 1 TABLET TWICE A DAY FOR WEIGHT LOSS/ENERGY 180 tablet 3     Hypertonic Nasal Wash (SINUS RINSE KIT NA)        lisinopril-hydrochlorothiazide (ZESTORETIC) 10-12.5 MG tablet Take 1 tablet by mouth daily This is a combo pill for blood pressure - replaces just lisinopril 90 tablet 0     lisinopril-hydrochlorothiazide (ZESTORETIC) 20-25 MG tablet TAKE 1 TABLET BY MOUTH DAILY THIS IS DOUBLE DOSAGE FOR BLOOD PRESSURE IN AM 90 tablet 1     omeprazole (PRILOSEC) 20 MG DR capsule Take 1 capsule (20 mg) by mouth daily 90 capsule 3     order for DME CPAP supplies:  Mask, hose, tank, filters 1 each 0     simvastatin (ZOCOR) 10 MG tablet TAKE 1 TABLET BY MOUTH EVERYDAY AT BEDTIME 90 tablet 1       Problem List:  Patient Active Problem List    Diagnosis Date Noted     Status post bariatric surgery 04/07/2022     Priority: Medium     LAPAROSCOPIC GASTRIC BANDING 02/23/2009         Haney esophagus 04/07/2022     Priority: Medium     Morbid obesity (H) 02/11/2020     Priority: Medium     Thrombocytopenia (H) 07/11/2017     Priority: Medium     Since at least 2013       Hyperlipidemia LDL goal <130 06/06/2017      Priority: Medium     Fatty liver 03/08/2016     Priority: Medium     ultrasound 2014       OREN (obstructive sleep apnea) 04/09/2014     Priority: Medium     Essential hypertension with goal blood pressure less than 140/90 07/30/2013     Priority: Medium     Mild persistent asthma 02/26/2013     Priority: Medium     Migraine, ophthalmoplegic 08/23/2010     Priority: Medium     Chondromalacia, knee, right 08/01/2017     Priority: Low     Chondromalacia, knee, left 06/13/2017     Priority: Low     Advanced directives, counseling/discussion 06/06/2017     Priority: Low     i gave handouts       Other tear of medial meniscus of left knee as current injury, subsequent encounter 01/17/2017     Priority: Low     Gastroesophageal reflux disease without esophagitis 03/08/2016     Priority: Low     Allergy to mold spores      Priority: Low     1/27/15 skin tests pos. for:  cat/dog/DM/M/T/G/W        House dust mite allergy      Priority: Low     Allergic rhinitis due to animal dander      Priority: Low     Seasonal allergic conjunctivitis      Priority: Low     Seasonal allergic rhinitis      Priority: Low     Shoulder impingement - left 08/09/2013     Priority: Low     BURSITIS, PREPATELLAR - right 08/12/2010     Priority: Low        Past Medical/Surgical History:  Past Medical History:   Diagnosis Date     Allergic rhinitis due to animal dander      Allergy to mold spores     1/27/15 skin tests pos. for:  cat/dog/DM/M/T/G/W      Arthritis      Chondromalacia, knee, left      Chondromalacia, knee, right      Diagnostic skin and sensitization tests (aka ALLERGENS) 01/27/2015    1/27/15 skin tests pos. for:  cat/dog/DM/M/T/G/W     High cholesterol      House dust mite allergy      Hypertension goal BP (blood pressure) < 140/90      LBP (low back pain)      Migraines      Obesity      OREN (obstructive sleep apnea)      PONV (postoperative nausea and vomiting)      S/P arthroscopy of left knee 08/01/2017     Seasonal allergic  conjunctivitis      Seasonal allergic rhinitis      Seizure disorder (H)      Thrombocytopenia (H) 07/11/2017     Unspecified asthma(493.90)      Past Surgical History:   Procedure Laterality Date     ARTHROSCOPY KNEE Left 07/21/2017    Procedure: ARTHROSCOPY KNEE;  Left knee arthroscopy, meniscectomy, debridement;  Surgeon: Rishabh Logan MD;  Location: [facility] OR     ARTHROSCOPY KNEE Right 09/01/2017    Procedure: ARTHROSCOPY KNEE;  Right knee arthroscopy, Chrondroplasty;  Surgeon: Rishabh Logan MD;  Location: [facility] OR     BARIATRIC SURGERY  02/23/2009    LAPAROSCOPIC GASTRIC BANDING 02/23/2009     COLONOSCOPY  04/01/2013    Procedure: COLONOSCOPY;  COLONOSCOPY SCREEN;  Surgeon: Jameel Patel MD;  Location: [facility] OR     COMBINED ESOPHAGOSCOPY, GASTROSCOPY, DUODENOSCOPY (EGD) WITH CO2 INSUFFLATION N/A 03/17/2021    Procedure: ESOPHAGOGASTRODUODENOSCOPY, WITH CO2 INSUFFLATION;  Surgeon: Kenneth Sellers MD;  Location: [facility]     ESOPHAGOSCOPY, GASTROSCOPY, DUODENOSCOPY (EGD), COMBINED N/A 08/21/2014    Procedure: COMBINED ESOPHAGOSCOPY, GASTROSCOPY, DUODENOSCOPY (EGD), BIOPSY SINGLE OR MULTIPLE;  Surgeon: Jameel Patel MD;  Location: [facility]     ESOPHAGOSCOPY, GASTROSCOPY, DUODENOSCOPY (EGD), COMBINED N/A 03/17/2021    Procedure: Esophagogastroduodenoscopy, With Biopsy;  Surgeon: Kenneth Sellers MD;  Location: [facility]     LAPAROSCOPIC CHOLECYSTECTOMY N/A 09/10/2014    Procedure: LAPAROSCOPIC CHOLECYSTECTOMY;  Surgeon: Jameel Patel MD;  Location: [facility] OR     LASIK  01/01/2001     ORTHOPEDIC SURGERY  08/20/2004    Left shoulder/clavicle [place]- Dr Logan     TONSILLECTOMY  1968       Social History:  Social History     Socioeconomic History     Marital status: Single     Spouse name: Not on file     Number of children: Not on file     Years of education: Not on file     Highest education level: Not on file   Occupational History     Occupation: Inventory  "manager-    Tobacco Use     Smoking status: Never Smoker     Smokeless tobacco: Never Used     Tobacco comment: non smoking household   Substance and Sexual Activity     Alcohol use: Yes     Comment: 2-3/night     Drug use: No     Sexual activity: Yes     Partners: Female     Birth control/protection: Condom   Other Topics Concern     Parent/sibling w/ CABG, MI or angioplasty before 65F 55M? Yes     Comment: MOM   Social History Narrative     Not on file     Social Determinants of Health     Financial Resource Strain: Not on file   Food Insecurity: Not on file   Transportation Needs: Not on file   Physical Activity: Not on file   Stress: Not on file   Social Connections: Not on file   Intimate Partner Violence: Not on file   Housing Stability: Not on file       Family History:  Family History   Problem Relation Age of Onset     Eye Disorder Mother      Heart Disease Mother         60yo MI.     Lipids Mother      Macular Degeneration Mother      Diabetes Mother      Hypertension Mother      Lipids Father      Cancer Father      Hypertension Father      Alcohol/Drug Brother      Diabetes Sister         hyperglycemic     Obesity Daughter      Glaucoma No family hx of      Cerebrovascular Disease No family hx of      Thyroid Disease No family hx of        Review of Systems:  A complete review of systems reviewed by me is negative with the exeption of what has been mentioned in the history of present illness.    Night sweats 2/week, joint pains in handes     Physical Examination:  Vitals: Ht 1.6 m (5' 3\")   Wt 89.8 kg (198 lb)   BMI 35.07 kg/m    BMI= Body mass index is 35.07 kg/m .    SpO2 Readings from Last 4 Encounters:   01/27/22 97%   03/17/21 95%   02/22/21 95%   02/11/20 97%       GENERAL APPEARANCE: alert and no distress  EYES: Eyes grossly normal to inspection  HENT: mouth without ulcers or lesions  NECK: generous size  LUNGS: no shortness of breath , cough  NEURO: mentation intact, " speech normal and cranial nerves 2-12 appear intact  PSYCH: affect normal/bright           Data: All pertinent previous laboratory data reviewed     Recent Labs   Lab Test 12/21/21  0703 02/10/21  0711    137   POTASSIUM 3.6 3.8   CHLORIDE 102 103   CO2 28 30   ANIONGAP 6 4   * 119*   BUN 10 10   CR 0.92 0.90   BARRETT 9.8 9.6       Recent Labs   Lab Test 03/01/22  0958   WBC 8.5   RBC 4.20*   HGB 15.0   HCT 41.2   MCV 98   MCH 35.7*   MCHC 36.4   RDW 12.0   *       Recent Labs   Lab Test 03/01/22  0958   PROTTOTAL 7.8   ALBUMIN 4.1   BILITOTAL 0.6   ALKPHOS 53   AST 67*   ALT 67       TSH (mU/L)   Date Value   07/17/2013 1.92       Ferritin   Date/Time Value Ref Range Status   03/31/2010 07:47 AM 66 20 - 300 ng/mL Final           Sukhdev Landis MD 4/11/2022

## 2022-05-04 ENCOUNTER — DOCUMENTATION ONLY (OUTPATIENT)
Dept: SLEEP MEDICINE | Facility: CLINIC | Age: 62
End: 2022-05-04
Payer: COMMERCIAL

## 2022-05-04 DIAGNOSIS — E66.01 MORBID OBESITY (H): Primary | ICD-10-CM

## 2022-05-04 DIAGNOSIS — G47.33 OSA (OBSTRUCTIVE SLEEP APNEA): ICD-10-CM

## 2022-05-04 NOTE — PROGRESS NOTES
Patient came to [facility] for CPAP pressure change to 15-18 cm H2O. Also provided new supplies and power cord.     JHON Amaya Coordinator

## 2022-05-07 ENCOUNTER — HEALTH MAINTENANCE LETTER (OUTPATIENT)
Age: 62
End: 2022-05-07

## 2022-06-10 DIAGNOSIS — R53.83 OTHER FATIGUE: ICD-10-CM

## 2022-06-10 DIAGNOSIS — E66.01 MORBID OBESITY (H): ICD-10-CM

## 2022-06-10 RX ORDER — BUPROPION HYDROCHLORIDE 150 MG/1
TABLET, EXTENDED RELEASE ORAL
Qty: 180 TABLET | Refills: 0 | Status: SHIPPED | OUTPATIENT
Start: 2022-06-10 | End: 2022-09-06

## 2022-09-13 DIAGNOSIS — E78.5 HYPERLIPIDEMIA LDL GOAL <130: ICD-10-CM

## 2022-09-13 RX ORDER — SIMVASTATIN 10 MG
TABLET ORAL
Qty: 90 TABLET | Refills: 1 | Status: SHIPPED | OUTPATIENT
Start: 2022-09-13 | End: 2023-01-09

## 2022-09-13 NOTE — TELEPHONE ENCOUNTER
Prescription approved per [facility] Refill Protocol.    Jacob Sanchez RN BSN  [facility]

## 2022-12-04 DIAGNOSIS — I10 ESSENTIAL HYPERTENSION WITH GOAL BLOOD PRESSURE LESS THAN 140/90: ICD-10-CM

## 2022-12-04 DIAGNOSIS — K22.70 BARRETT'S ESOPHAGUS: ICD-10-CM

## 2022-12-04 DIAGNOSIS — E78.5 HYPERLIPIDEMIA LDL GOAL <130: ICD-10-CM

## 2022-12-05 ENCOUNTER — MYC MEDICAL ADVICE (OUTPATIENT)
Dept: FAMILY MEDICINE | Facility: CLINIC | Age: 62
End: 2022-12-05

## 2022-12-05 DIAGNOSIS — Z00.00 PREVENTATIVE HEALTH CARE: Primary | ICD-10-CM

## 2022-12-05 DIAGNOSIS — Z12.5 SCREENING PSA (PROSTATE SPECIFIC ANTIGEN): ICD-10-CM

## 2022-12-05 DIAGNOSIS — E78.5 HYPERLIPIDEMIA LDL GOAL <130: Chronic | ICD-10-CM

## 2022-12-05 DIAGNOSIS — D69.6 THROMBOCYTOPENIA (H): Chronic | ICD-10-CM

## 2022-12-05 DIAGNOSIS — R73.9 HYPERGLYCEMIA: ICD-10-CM

## 2022-12-05 RX ORDER — LISINOPRIL AND HYDROCHLOROTHIAZIDE 20; 25 MG/1; MG/1
1 TABLET ORAL DAILY
Qty: 90 TABLET | Refills: 0 | Status: SHIPPED | OUTPATIENT
Start: 2022-12-05 | End: 2023-02-14

## 2022-12-05 RX ORDER — AMLODIPINE BESYLATE 10 MG/1
TABLET ORAL
Qty: 90 TABLET | Refills: 0 | Status: SHIPPED | OUTPATIENT
Start: 2022-12-05 | End: 2023-02-14

## 2022-12-05 RX ORDER — SIMVASTATIN 10 MG
TABLET ORAL
Qty: 90 TABLET | Refills: 1 | OUTPATIENT
Start: 2022-12-05

## 2022-12-05 NOTE — TELEPHONE ENCOUNTER
Patient will need an office appointment after previsit fasting labs. No evisit. Martin Howe MD   
Provider:  Please see MyChart message from the patient. Please enter labs if appropriate.  You had last seen this person in person on 1/27/2022.  Thank you. Cari Martinez R.N.    
Sent [employer] message with Dr Howe's message.Megan Hoff MA/AMANDA    
Acuity of illness

## 2022-12-13 ENCOUNTER — MYC MEDICAL ADVICE (OUTPATIENT)
Dept: SLEEP MEDICINE | Facility: CLINIC | Age: 62
End: 2022-12-13

## 2022-12-13 ENCOUNTER — MYC MEDICAL ADVICE (OUTPATIENT)
Dept: FAMILY MEDICINE | Facility: CLINIC | Age: 62
End: 2022-12-13

## 2022-12-13 DIAGNOSIS — E66.01 MORBID OBESITY (H): Chronic | ICD-10-CM

## 2022-12-13 DIAGNOSIS — G47.33 OSA (OBSTRUCTIVE SLEEP APNEA): Primary | ICD-10-CM

## 2022-12-13 NOTE — CONFIDENTIAL NOTE
Routing encounter to Sleep Medicine care team.   See [employer] message below. Patient having issues with CPap machine, patient of Dr. Landis.

## 2022-12-15 NOTE — TELEPHONE ENCOUNTER
Dr. Landis,   Sleep study x2 and 1 MSLT have been scanned into PROC. CPAP rx from 8/16/2017 was scanned into [employer]. Patient requesting supplies and we finally got the information that provider has been requesting.  Roz Hassan, CMA

## 2022-12-26 ENCOUNTER — HEALTH MAINTENANCE LETTER (OUTPATIENT)
Age: 62
End: 2022-12-26

## 2023-01-05 ENCOUNTER — LAB (OUTPATIENT)
Dept: LAB | Facility: CLINIC | Age: 63
End: 2023-01-05
Payer: COMMERCIAL

## 2023-01-05 DIAGNOSIS — Z00.00 PREVENTATIVE HEALTH CARE: ICD-10-CM

## 2023-01-05 DIAGNOSIS — E78.5 HYPERLIPIDEMIA LDL GOAL <130: Chronic | ICD-10-CM

## 2023-01-05 LAB
ALBUMIN SERPL-MCNC: 4.1 G/DL (ref 3.4–5)
ALP SERPL-CCNC: 44 U/L (ref 40–150)
ALT SERPL W P-5'-P-CCNC: 80 U/L (ref 0–70)
ANION GAP SERPL CALCULATED.3IONS-SCNC: 5 MMOL/L (ref 3–14)
AST SERPL W P-5'-P-CCNC: 112 U/L (ref 0–45)
BILIRUB SERPL-MCNC: 0.9 MG/DL (ref 0.2–1.3)
BUN SERPL-MCNC: 10 MG/DL (ref 7–30)
CALCIUM SERPL-MCNC: 9.6 MG/DL (ref 8.5–10.1)
CHLORIDE BLD-SCNC: 101 MMOL/L (ref 94–109)
CHOLEST SERPL-MCNC: 152 MG/DL
CO2 SERPL-SCNC: 30 MMOL/L (ref 20–32)
CREAT SERPL-MCNC: 0.76 MG/DL (ref 0.66–1.25)
ERYTHROCYTE [DISTWIDTH] IN BLOOD BY AUTOMATED COUNT: 12.6 % (ref 10–15)
FASTING STATUS PATIENT QL REPORTED: YES
GFR SERPL CREATININE-BSD FRML MDRD: >90 ML/MIN/1.73M2
GLUCOSE BLD-MCNC: 103 MG/DL (ref 70–99)
HCT VFR BLD AUTO: 39.8 % (ref 40–53)
HDLC SERPL-MCNC: 56 MG/DL
HGB BLD-MCNC: 14.4 G/DL (ref 13.3–17.7)
LDLC SERPL CALC-MCNC: 80 MG/DL
MCH RBC QN AUTO: 34.9 PG (ref 26.5–33)
MCHC RBC AUTO-ENTMCNC: 36.2 G/DL (ref 31.5–36.5)
MCV RBC AUTO: 96 FL (ref 78–100)
NONHDLC SERPL-MCNC: 96 MG/DL
PLATELET # BLD AUTO: 94 10E3/UL (ref 150–450)
POTASSIUM BLD-SCNC: 3.3 MMOL/L (ref 3.4–5.3)
PROT SERPL-MCNC: 7.6 G/DL (ref 6.8–8.8)
RBC # BLD AUTO: 4.13 10E6/UL (ref 4.4–5.9)
SODIUM SERPL-SCNC: 136 MMOL/L (ref 133–144)
TRIGL SERPL-MCNC: 81 MG/DL
WBC # BLD AUTO: 5.8 10E3/UL (ref 4–11)

## 2023-01-05 PROCEDURE — 36415 COLL VENOUS BLD VENIPUNCTURE: CPT

## 2023-01-05 PROCEDURE — 80061 LIPID PANEL: CPT

## 2023-01-05 PROCEDURE — 80053 COMPREHEN METABOLIC PANEL: CPT

## 2023-01-05 PROCEDURE — 85027 COMPLETE CBC AUTOMATED: CPT

## 2023-01-09 ENCOUNTER — TELEPHONE (OUTPATIENT)
Dept: FAMILY MEDICINE | Facility: CLINIC | Age: 63
End: 2023-01-09

## 2023-01-09 ENCOUNTER — E-VISIT (OUTPATIENT)
Dept: FAMILY MEDICINE | Facility: CLINIC | Age: 63
End: 2023-01-09
Payer: COMMERCIAL

## 2023-01-09 DIAGNOSIS — I10 ESSENTIAL HYPERTENSION WITH GOAL BLOOD PRESSURE LESS THAN 140/90: ICD-10-CM

## 2023-01-09 DIAGNOSIS — E66.01 MORBID OBESITY (H): ICD-10-CM

## 2023-01-09 DIAGNOSIS — R53.83 OTHER FATIGUE: ICD-10-CM

## 2023-01-09 DIAGNOSIS — E78.5 HYPERLIPIDEMIA LDL GOAL <130: ICD-10-CM

## 2023-01-09 PROCEDURE — 99422 OL DIG E/M SVC 11-20 MIN: CPT | Performed by: FAMILY MEDICINE

## 2023-01-09 RX ORDER — BUPROPION HYDROCHLORIDE 150 MG/1
TABLET, EXTENDED RELEASE ORAL
Qty: 180 TABLET | Refills: 1 | Status: SHIPPED | OUTPATIENT
Start: 2023-01-09 | End: 2024-07-23

## 2023-01-09 NOTE — TELEPHONE ENCOUNTER
Martin Howe MD  [facility]  Primary Care  Please call patient and help set-up appointment in next few weeks because of multiple abnormal labs and due for follow-up blood pressure/medication check.     Patient:   Cesar Foss   [phone] (home)     Provider:   Martin Howe MD MD   Please call/evisit with questions or concerns.

## 2023-01-23 ENCOUNTER — DOCUMENTATION ONLY (OUTPATIENT)
Dept: SLEEP MEDICINE | Facility: CLINIC | Age: 63
End: 2023-01-23
Payer: COMMERCIAL

## 2023-01-23 DIAGNOSIS — G47.33 OSA (OBSTRUCTIVE SLEEP APNEA): Primary | ICD-10-CM

## 2023-01-23 NOTE — PROGRESS NOTES
Patient was offered choice of vendor and chose [facility].  Patient Cesar Lozanod was set up at [facility]  on January 23, 2023. Patient received a Resmed Airsense 11 Pressures were set at 15-18 cm H2O.   Patient s ramp is 5 cm H2O for Auto and FLEX/EPR is 2.  Patient received a Resmed Mask name: [employer]   Nasal mask size Standard, heated tubing and heated humidifier.  Patient does not need to meet compliance.  Heidi Gomez

## 2023-02-13 NOTE — PROGRESS NOTES
ASSESSMENT / PLAN:  (I10) Essential hypertension with goal blood pressure less than 140/90  (primary encounter diagnosis)  Comment: stable  Plan: amLODIPine (NORVASC) 10 MG tablet,         lisinopril-hydrochlorothiazide (ZESTORETIC)         20-25 MG tablet        Continue exerciise. If chest pain or shortness of breath to er.     (Z12.11) Colon cancer screening  Plan: Adult GI  Referral - Procedure Only        Due in spring.     (R53.83) Other fatigue  Comment: stable  Plan: continue wellbutrin    (E66.01) Morbid obesity (H)  Comment: improving  Plan: continue lower carb diet/wellbutrin    (E78.5) Hyperlipidemia LDL goal <130  Comment: stable  Plan: simvastatin (ZOCOR) 10 MG tablet        Lower carb diet.     (J45.30) Mild persistent asthma without complication  Comment: stable  Plan: albuterol (PROAIR HFA) 108 (90 Base) MCG/ACT         inhaler, budesonide (PULMICORT FLEXHALER) 180         MCG/ACT inhaler            (K22.719) Haney's esophagus with dysplasia  Comment: stable  Plan: omeprazole (PRILOSEC) 20 MG DR capsule        Per GI    (K76.0) Fatty liver  Comment: worse but had a few drinks the night before lab  Plan: continue weight loss/lower carbs and limit ALCOHOL. Expected course and warning signs reviewed. Recheck in 6 months          Subjective   Cesar is a 62 year old presenting for the following health issues:  Follow-up htn, gerd/barrets, high cholesterol, hyperglycemia, fatty liver, obesity, low platelets and asthma  No black/bloody stools.   Active and some weight lift loss.   financinee watching diet and no bread.  No chest pain or shortness of breath. No nausea, vomiting or diarrhea.   Emotionally doing ok.   Engaged for 20 years.   No bleeding issues.  Diet pop x2.   No urine changes -flow ok. No std concerns.   Breathings stable.  Occasionally albuterol.   No chief complaint on file.      History of Present Illness       Reason for visit:  Blood work followup    He eats 0-1 servings of  "fruits and vegetables daily.He consumes 2 sweetened beverage(s) daily.He exercises with enough effort to increase his heart rate 20 to 29 minutes per day.    He is taking medications regularly.         Review of Systems         Objective    There were no vitals taken for this visit.  There is no height or weight on file to calculate BMI.   /68   Pulse 115   Temp 98.6  F (37  C) (Oral)   Ht 1.607 m (5' 3.25\")   Wt 83.6 kg (184 lb 6.4 oz)   SpO2 98%   BMI 32.41 kg/m     Physical Exam   GENERAL: healthy, alert and no distress  HENT: ear canals and TM's normal, nose and mouth without ulcers or lesions  NECK: no adenopathy, no asymmetry, masses, or scars and thyroid normal to palpation  RESP: lungs clear to auscultation - no rales, rhonchi or wheezes  CV: regular rate and rhythm, normal S1 S2, no S3 or S4, no murmur, click or rub, no peripheral edema and peripheral pulses strong  ABDOMEN: soft, nontender, no hepatosplenomegaly, no masses and bowel sounds normal  MS: no gross musculoskeletal defects noted, no edema  NEURO: Normal strength and tone, mentation intact and speech normal  PSYCH: mentation appears normal, affect normal/bright        "

## 2023-02-14 ENCOUNTER — OFFICE VISIT (OUTPATIENT)
Dept: FAMILY MEDICINE | Facility: CLINIC | Age: 63
End: 2023-02-14
Payer: COMMERCIAL

## 2023-02-14 VITALS
HEIGHT: 63 IN | DIASTOLIC BLOOD PRESSURE: 68 MMHG | HEART RATE: 115 BPM | OXYGEN SATURATION: 98 % | BODY MASS INDEX: 32.67 KG/M2 | SYSTOLIC BLOOD PRESSURE: 131 MMHG | TEMPERATURE: 98.6 F | WEIGHT: 184.4 LBS

## 2023-02-14 DIAGNOSIS — J45.30 MILD PERSISTENT ASTHMA WITHOUT COMPLICATION: ICD-10-CM

## 2023-02-14 DIAGNOSIS — Z12.11 COLON CANCER SCREENING: ICD-10-CM

## 2023-02-14 DIAGNOSIS — E66.01 MORBID OBESITY (H): ICD-10-CM

## 2023-02-14 DIAGNOSIS — E78.5 HYPERLIPIDEMIA LDL GOAL <130: ICD-10-CM

## 2023-02-14 DIAGNOSIS — K22.719 BARRETT'S ESOPHAGUS WITH DYSPLASIA: ICD-10-CM

## 2023-02-14 DIAGNOSIS — I10 ESSENTIAL HYPERTENSION WITH GOAL BLOOD PRESSURE LESS THAN 140/90: Primary | ICD-10-CM

## 2023-02-14 DIAGNOSIS — K76.0 FATTY LIVER: Chronic | ICD-10-CM

## 2023-02-14 DIAGNOSIS — R53.83 OTHER FATIGUE: ICD-10-CM

## 2023-02-14 PROCEDURE — 90471 IMMUNIZATION ADMIN: CPT | Performed by: FAMILY MEDICINE

## 2023-02-14 PROCEDURE — 99214 OFFICE O/P EST MOD 30 MIN: CPT | Mod: 25 | Performed by: FAMILY MEDICINE

## 2023-02-14 PROCEDURE — 90750 HZV VACC RECOMBINANT IM: CPT | Performed by: FAMILY MEDICINE

## 2023-02-14 PROCEDURE — 90472 IMMUNIZATION ADMIN EACH ADD: CPT | Performed by: FAMILY MEDICINE

## 2023-02-14 PROCEDURE — 90677 PCV20 VACCINE IM: CPT | Performed by: FAMILY MEDICINE

## 2023-02-14 RX ORDER — BUDESONIDE 180 UG/1
1 AEROSOL, POWDER RESPIRATORY (INHALATION) 2 TIMES DAILY
Qty: 3 EACH | Refills: 1 | Status: SHIPPED | OUTPATIENT
Start: 2023-02-14

## 2023-02-14 RX ORDER — LISINOPRIL AND HYDROCHLOROTHIAZIDE 20; 25 MG/1; MG/1
1 TABLET ORAL DAILY
Qty: 90 TABLET | Refills: 3 | Status: SHIPPED | OUTPATIENT
Start: 2023-02-14 | End: 2024-02-12

## 2023-02-14 RX ORDER — ALBUTEROL SULFATE 90 UG/1
AEROSOL, METERED RESPIRATORY (INHALATION)
Qty: 18 G | Refills: 2 | Status: SHIPPED | OUTPATIENT
Start: 2023-02-14

## 2023-02-14 RX ORDER — SIMVASTATIN 10 MG
10 TABLET ORAL AT BEDTIME
Qty: 90 TABLET | Refills: 3 | Status: SHIPPED | OUTPATIENT
Start: 2023-02-14 | End: 2024-02-12

## 2023-02-14 RX ORDER — AMLODIPINE BESYLATE 10 MG/1
TABLET ORAL
Qty: 90 TABLET | Refills: 4 | Status: SHIPPED | OUTPATIENT
Start: 2023-02-14 | End: 2024-03-18

## 2023-02-14 ASSESSMENT — ASTHMA QUESTIONNAIRES
QUESTION_5 LAST FOUR WEEKS HOW WOULD YOU RATE YOUR ASTHMA CONTROL: WELL CONTROLLED
ACT_TOTALSCORE: 22
QUESTION_4 LAST FOUR WEEKS HOW OFTEN HAVE YOU USED YOUR RESCUE INHALER OR NEBULIZER MEDICATION (SUCH AS ALBUTEROL): ONCE A WEEK OR LESS
QUESTION_2 LAST FOUR WEEKS HOW OFTEN HAVE YOU HAD SHORTNESS OF BREATH: NOT AT ALL
QUESTION_3 LAST FOUR WEEKS HOW OFTEN DID YOUR ASTHMA SYMPTOMS (WHEEZING, COUGHING, SHORTNESS OF BREATH, CHEST TIGHTNESS OR PAIN) WAKE YOU UP AT NIGHT OR EARLIER THAN USUAL IN THE MORNING: ONCE OR TWICE
ACT_TOTALSCORE: 22
QUESTION_1 LAST FOUR WEEKS HOW MUCH OF THE TIME DID YOUR ASTHMA KEEP YOU FROM GETTING AS MUCH DONE AT WORK, SCHOOL OR AT HOME: NONE OF THE TIME

## 2023-02-14 ASSESSMENT — PAIN SCALES - GENERAL: PAINLEVEL: NO PAIN (0)

## 2023-02-14 NOTE — NURSING NOTE
Prior to immunization administration, verified patients identity using patient s name and date of birth. Please see Immunization Activity for additional information.     Screening Questionnaire for Adult Immunization    Are you sick today?   No   Do you have allergies to medications, food, a vaccine component or latex?   No   Have you ever had a serious reaction after receiving a vaccination?   No   Do you have a long-term health problem with heart, lung, kidney, or metabolic disease (e.g., diabetes), asthma, a blood disorder, no spleen, complement component deficiency, a cochlear implant, or a spinal fluid leak?  Are you on long-term aspirin therapy?   No   Do you have cancer, leukemia, HIV/AIDS, or any other immune system problem?   No   Do you have a parent, brother, or sister with an immune system problem?   No   In the past 3 months, have you taken medications that affect  your immune system, such as prednisone, other steroids, or anticancer drugs; drugs for the treatment of rheumatoid arthritis, Crohn s disease, or psoriasis; or have you had radiation treatments?   No   Have you had a seizure, or a brain or other nervous system problem?   No   During the past year, have you received a transfusion of blood or blood    products, or been given immune (gamma) globulin or antiviral drug?   No   For women: Are you pregnant or is there a chance you could become       pregnant during the next month?   No   Have you received any vaccinations in the past 4 weeks?   No     Immunization questionnaire answers were all negative.        Per orders of Dr. Howe, injection of [id number] & Shingrix   given by Lisa Isaac. Patient instructed to remain in clinic for 15 minutes afterwards, and to report any adverse reaction to me immediately.       Screening performed by Lisa Isaac on 2/14/2023 at 7:11 AM.

## 2023-02-16 ENCOUNTER — TELEPHONE (OUTPATIENT)
Dept: GASTROENTEROLOGY | Facility: CLINIC | Age: 63
End: 2023-02-16
Payer: COMMERCIAL

## 2023-02-16 NOTE — TELEPHONE ENCOUNTER
PT DID NOT FINISH ASSESSMENT, HE IS WANTING TO BE SEEN AT  BUT HAS OREN. HE WILL CALL BACK TO SCHEDULE     Screening Questions  BLUE  KIND OF PREP RED  LOCATION [review exclusion criteria] GREEN  SEDATION TYPE        Y Are you active on mychart?       FAHAD RAYMOND Ordering/Referring Provider?        [facility] What type of coverage do you have?      N Have you had a positive covid test in the last 14 days?     32.41 1. BMI  [BMI 40+ - review exclusion criteria]    Y  2. Are you able to give consent for your medical care? [IF NO,RN REVIEW]          N  3. Are you taking any prescription pain medications on a routine schedule   (ex narcotics: oxycodone, roxicodone, oxycontin,  and percocet)? [RN Review]          3a. EXTENDED PREP What kind of prescription?     N 4. Do you have any chemical dependencies such as alcohol, street drugs, or methadone?        **If yes 3- 5 , please schedule with MAC sedation.**          IF YES TO ANY 6 - 10 - HOSPITAL SETTING ONLY.     N 6.   Do you need assistance transferring?     N 7.   Have you had a heart or lung transplant?    N 8.   Are you currently on dialysis?   N 9.   Do you use daily home oxygen?   N 10. Do you take nitroglycerin?   10a.  If yes, how often?     11. [FEMALES]  N/A Are you currently pregnant?    11a.  If yes, how many weeks? [ Greater than 12 weeks, OR NEEDED]    N 12. Do you have Pulmonary Hypertension? *NEED PAC APPT AT [facility] w/ MAC*     N 13. [review exclusion criteria]  Do you have any implantable devices in your body (pacemaker, defib, LVAD)?    N 14. In the past 6 months, have you had any heart related issues including cardiomyopathy or heart attack?     14a.  If yes, did it require cardiac stenting if so when?     N 15. Have you had a stroke or Transient ischemic attack (TIA - aka  mini stroke ) within 6 months?      Y 16. Do you have mod to severe Obstructive Sleep Apnea?  [Hospital only]     17. Do you have SEVERE AND UNCONTROLLED asthma? *NEED  "PAC APPT AT [facility] w/MAC*      18. Are you currently taking any blood thinners?     18a. If yes, inform patient to \"follow up w/ ordering provider for bridging instructions.\"     19. Do you take the medication Phentermine?    19a. If yes, \"Hold for 7 days before procedure.  Please consult your prescribing provider if you have questions about holding this medication.\"       20. Do you have chronic kidney disease?        21. Do you have a diagnosis of diabetes?       22. On a regular basis do you go 3-5 days between bowel movements?      23. Preferred LOCAL Pharmacy for Pre Prescription    [ LIST ONLY ONE PHARMACY]     [id number] - [place], [place] - [address] [place] [address] AT CORNER OF [facility]        - CLOSING REMINDERS -    Informed patient they will need an adult    Cannot take any type of public or medical transportation alone    Conscious Sedation- Needs  for 6 hours after the procedure       MAC/General-Needs  for 24 hours after procedure    Pre-Procedure Covid test to be completed [[facility] PCR Testing Required]    Confirmed Nurse will call to complete assessment       - SCHEDULING DETAILS -   Hospital Setting Required? If yes, what is the exclusion?:      Surgeon      Date of Procedure  Lower Endoscopy [Colonoscopy]  Type of Procedure Scheduled  [facility]-If you answer yes to questions #8, #20, #21Which Colonoscopy Prep was Sent?      Sedation Type      PAC / Pre-op Required               "

## 2023-02-17 ENCOUNTER — TELEPHONE (OUTPATIENT)
Dept: SURGERY | Facility: CLINIC | Age: 63
End: 2023-02-17
Payer: COMMERCIAL

## 2023-02-17 NOTE — TELEPHONE ENCOUNTER
Screening Questions  BLUE  KIND OF PREP RED  LOCATION [review exclusion criteria] GREEN  SEDATION TYPE        y Are you active on mychart?       Shyam Salazar Ordering/Referring Provider?        [facility] What type of coverage do you have?      N Have you had a positive covid test in the last 14 days?     32.41 1. BMI  [BMI 40+ - review exclusion criteria]    Y  2. Are you able to give consent for your medical care? [IF NO,RN REVIEW]          N  3. Are you taking any prescription pain medications on a routine schedule   (ex narcotics: oxycodone, roxicodone, oxycontin,  and percocet)? [RN Review]        N  3a. EXTENDED PREP What kind of prescription?     N 4. Do you have any chemical dependencies such as alcohol, street drugs, or methadone?        **If yes 3- 5 , please schedule with MAC sedation.**          IF YES TO ANY 6 - 10 - HOSPITAL SETTING ONLY.     N 6.   Do you need assistance transferring?     N 7.   Have you had a heart or lung transplant?    N 8.   Are you currently on dialysis?   N 9.   Do you use daily home oxygen?   N 10. Do you take nitroglycerin?   10a. N If yes, how often?     11. [FEMALES]  N Are you currently pregnant?    11a. n If yes, how many weeks? [ Greater than 12 weeks, OR NEEDED]    N 12. Do you have Pulmonary Hypertension? *NEED PAC APPT AT [facility] w/ MAC*     N 13. [review exclusion criteria]  Do you have any implantable devices in your body (pacemaker, defib, LVAD)?    N 14. In the past 6 months, have you had any heart related issues including cardiomyopathy or heart attack?     14a. N If yes, did it require cardiac stenting if so when?     N 15. Have you had a stroke or Transient ischemic attack (TIA - aka  mini stroke ) within 6 months?      Y 16. Do you have mod to severe Obstructive Sleep Apnea?  [Hospital only]    N 17. Do you have SEVERE AND UNCONTROLLED asthma? *NEED PAC APPT AT [facility] w/MAC*     N 18. Are you currently taking any blood thinners?     18a. If yes, inform  "patient to \"follow up w/ ordering provider for bridging instructions.\"    N 19. Do you take the medication Phentermine?    19a. If yes, \"Hold for 7 days before procedure.  Please consult your prescribing provider if you have questions about holding this medication.\"     N  20. Do you have chronic kidney disease?      N  21. Do you have a diagnosis of diabetes?     N  22. On a regular basis do you go 3-5 days between bowel movements?     See below 23. Preferred LOCAL Pharmacy for Pre Prescription    [ LIST ONLY ONE PHARMACY]     [id number] - [place], [place] - [address] AT CORNER OF [facility]        - CLOSING REMINDERS -    Informed patient they will need an adult    Cannot take any type of public or medical transportation alone    Conscious Sedation- Needs  for 6 hours after the procedure       MAC/General-Needs  for 24 hours after procedure    Pre-Procedure Covid test to be completed [[facility] PCR Testing Required]    Confirmed Nurse will call to complete assessment       - SCHEDULING DETAILS -  N Hospital Setting Required? If yes, what is the exclusion?: DAVID Toussaint   Surgeon    7-17-23  Date of Procedure  Lower Endoscopy [Colonoscopy]  Type of Procedure Scheduled  [facility]-If you answer yes to questions #8, #20, #21Which Colonoscopy Prep was Sent?     GEN Sedation Type     N PAC / Pre-op Required                 "

## 2023-03-15 ENCOUNTER — MYC MEDICAL ADVICE (OUTPATIENT)
Dept: FAMILY MEDICINE | Facility: CLINIC | Age: 63
End: 2023-03-15
Payer: COMMERCIAL

## 2023-03-15 DIAGNOSIS — M54.9 BACK PAIN, UNSPECIFIED BACK LOCATION, UNSPECIFIED BACK PAIN LATERALITY, UNSPECIFIED CHRONICITY: Primary | ICD-10-CM

## 2023-03-15 RX ORDER — NAPROXEN SODIUM 550 MG/1
550 TABLET ORAL 2 TIMES DAILY WITH MEALS
Qty: 30 TABLET | Refills: 0 | Status: SHIPPED | OUTPATIENT
Start: 2023-03-15 | End: 2023-03-27

## 2023-03-15 RX ORDER — CYCLOBENZAPRINE HCL 10 MG
10 TABLET ORAL 3 TIMES DAILY PRN
Qty: 30 TABLET | Refills: 0 | Status: SHIPPED | OUTPATIENT
Start: 2023-03-15 | End: 2023-09-19

## 2023-03-15 NOTE — TELEPHONE ENCOUNTER
Patient has ER follow up scheduled for 3/24/23. Out of meds. Patient not sure if meds are working. Can you refill until appointment time?    Med and pharmacy pended.     Shanthi De Leon RN

## 2023-03-23 NOTE — PROGRESS NOTES
Cesar is a 63 year old who is being evaluated via a billable video visit.      How would you like to obtain your AVS? MyChart  If the video visit is dropped, the invitation should be resent by: Text to cell phone: [phone]  Will anyone else be joining your video visit? No      ASSESSMENT / PLAN:  (M54.16) Lumbar radiculopathy  (primary encounter diagnosis)  Comment: likely source of pain  Plan: cyclobenzaprine (FLEXERIL) 10 MG tablet,         HYDROcodone-acetaminophen (NORCO) 5-325 MG         tablet, predniSONE (DELTASONE) 20 MG tablet,         Physical Therapy Referral        Reveiwed risks and side effects of medication  Patient would like vicodin prn for sleep (avoid with ALCOHOL) and trial of prednisone. P.t. - local [place] p.t. referall. Patient to check insurance. Spine referral if worse/not improving. Can see ortho for chronic hip issues too if worse.         Subjective   Cesar is a 63 year old, presenting for the following health issues:  Follow-up 14 days ago.  Follow-up ED visit for left lower back/pain. Normal xray hip and given naprosyn/flexeril.  Overall slightly better. Can't see p.t. until April 17th.   History back issues in past and s/p nerve blocks. Was doing overall ok until recently. Hips hurt worse long walking. vicodin in past ok. No prednisone in past. GERD stable. Emotionally doing ok.   The patient reports that he was moving wooden logs out of a truck and passing them to other individuals on 3/7/2023 when he felt pain in his left lower back.     Per ed note:  Cesar Foss is a 63 y.o. male who presents with the above. Given reported history of congenital hip dysplasia, patient was very concerned about possible hip dislocation, however, he is independently ambulatory. Objectively, he has a normal range of motion of left hip, normal neurovascular exam. He does have some mild left lower lumbar paraspinal muscle tenderness to palpation, suspect most likely musculoskeletal strain/sprain of  his lower back causing some radiating pain. He has no red flag symptomatology for acute spinal cord emergency, he has a normal neurologic exam. I did obtain x-ray pelvis/left hip, on my independent review of these images, hips are normally seated in the joint, no fracture or dislocation is evident. Radiology read in agreement. Discussed lumbar strain/sprain, advise lidocaine patches, Tylenol, ibuprofen, short course of Flexeril provided for muscle spasm. I did provide outpatient referral to physical therapy, recommended that he work with physical therapy on strengthening exercises    No chief complaint on file.  No flowsheet data found.  HPI     ED/UC Followup:    Facility:  [facility]    Date of visit: 3/10/2023  Reason for visit: Acute left-sided low back pain without sciatica (Primary Dx);   Left hip pain  Discharge Disposition: Home Self Care  Current Status: Improved slightly.        Objective           Vitals:  No vitals were obtained today due to virtual visit.    Physical Exam   GENERAL: Healthy, alert and no distress  EYES: Eyes grossly normal to inspection.  No discharge or erythema, or obvious scleral/conjunctival abnormalities.  RESP: No audible wheeze, cough, or visible cyanosis.  No visible retractions or increased work of breathing.    SKIN: Visible skin clear. No significant rash, abnormal pigmentation or lesions.  NEURO: Cranial nerves grossly intact.  Mentation and speech appropriate for age.  PSYCH: Mentation appears normal, affect normal/bright, judgement and insight intact, normal speech and appearance well-groomed.        Video-Visit Details    Type of service:  Video Visit     Originating Location (pt. Location): Home    Distant Location (provider location):  On-site  Platform used for Video Visit: Stantum

## 2023-03-24 ENCOUNTER — VIRTUAL VISIT (OUTPATIENT)
Dept: FAMILY MEDICINE | Facility: CLINIC | Age: 63
End: 2023-03-24
Payer: COMMERCIAL

## 2023-03-24 DIAGNOSIS — M54.16 LUMBAR RADICULOPATHY: Primary | ICD-10-CM

## 2023-03-24 PROCEDURE — 99214 OFFICE O/P EST MOD 30 MIN: CPT | Mod: VID | Performed by: FAMILY MEDICINE

## 2023-03-24 RX ORDER — HYDROCODONE BITARTRATE AND ACETAMINOPHEN 5; 325 MG/1; MG/1
1 TABLET ORAL
Qty: 10 TABLET | Refills: 0 | Status: SHIPPED | OUTPATIENT
Start: 2023-03-24 | End: 2023-04-10

## 2023-03-24 RX ORDER — PREDNISONE 20 MG/1
TABLET ORAL
Qty: 7 TABLET | Refills: 0 | Status: ON HOLD | OUTPATIENT
Start: 2023-03-24 | End: 2023-07-17

## 2023-03-24 RX ORDER — CYCLOBENZAPRINE HCL 10 MG
10 TABLET ORAL 2 TIMES DAILY PRN
Qty: 30 TABLET | Refills: 1 | Status: SHIPPED | OUTPATIENT
Start: 2023-03-24 | End: 2023-04-10

## 2023-03-26 DIAGNOSIS — M54.9 BACK PAIN, UNSPECIFIED BACK LOCATION, UNSPECIFIED BACK PAIN LATERALITY, UNSPECIFIED CHRONICITY: ICD-10-CM

## 2023-03-27 RX ORDER — NAPROXEN SODIUM 550 MG/1
TABLET ORAL
Qty: 30 TABLET | Refills: 0 | Status: ON HOLD | OUTPATIENT
Start: 2023-03-27 | End: 2023-07-17

## 2023-04-04 ENCOUNTER — TRANSFERRED RECORDS (OUTPATIENT)
Dept: HEALTH INFORMATION MANAGEMENT | Facility: CLINIC | Age: 63
End: 2023-04-04

## 2023-04-08 ENCOUNTER — MYC MEDICAL ADVICE (OUTPATIENT)
Dept: FAMILY MEDICINE | Facility: CLINIC | Age: 63
End: 2023-04-08
Payer: COMMERCIAL

## 2023-04-08 DIAGNOSIS — M54.16 LUMBAR RADICULOPATHY: ICD-10-CM

## 2023-04-08 NOTE — LETTER
April 10, 2023      Cesar Foss  [address] [address] [address]        To Whom It May Concern:    Cesar Foss was seen in our clinic for severe back pain. Please allow patient to work virtually from home until he can see the back specialist.       Sincerely,        Martin Howe MD

## 2023-04-10 RX ORDER — CYCLOBENZAPRINE HCL 10 MG
10 TABLET ORAL 2 TIMES DAILY PRN
Qty: 30 TABLET | Refills: 1 | Status: SHIPPED | OUTPATIENT
Start: 2023-04-10 | End: 2023-05-24

## 2023-04-10 RX ORDER — HYDROCODONE BITARTRATE AND ACETAMINOPHEN 5; 325 MG/1; MG/1
1 TABLET ORAL
Qty: 10 TABLET | Refills: 0 | Status: SHIPPED | OUTPATIENT
Start: 2023-04-10 | End: 2023-06-01

## 2023-04-10 NOTE — TELEPHONE ENCOUNTER
Refills given on pain meds/muscle relaxers and referral for back specialiist. Please give # for set-up. Martin Howe MD

## 2023-04-10 NOTE — TELEPHONE ENCOUNTER
Provider:  Please see MyChart message from the patient. Please write requested note if appropriate. Thank you. Cari Martinez R.N.

## 2023-04-18 ENCOUNTER — ANCILLARY ORDERS (OUTPATIENT)
Dept: MRI IMAGING | Facility: CLINIC | Age: 63
End: 2023-04-18

## 2023-04-18 ENCOUNTER — TRANSFERRED RECORDS (OUTPATIENT)
Dept: HEALTH INFORMATION MANAGEMENT | Facility: CLINIC | Age: 63
End: 2023-04-18
Payer: COMMERCIAL

## 2023-04-18 DIAGNOSIS — M54.50 LUMBAR PAIN: ICD-10-CM

## 2023-04-20 ENCOUNTER — HOSPITAL ENCOUNTER (OUTPATIENT)
Dept: MRI IMAGING | Facility: CLINIC | Age: 63
Discharge: HOME | End: 2023-04-20
Attending: STUDENT IN AN ORGANIZED HEALTH CARE EDUCATION/TRAINING PROGRAM | Admitting: STUDENT IN AN ORGANIZED HEALTH CARE EDUCATION/TRAINING PROGRAM
Payer: COMMERCIAL

## 2023-04-20 DIAGNOSIS — M54.50 LUMBAR PAIN: ICD-10-CM

## 2023-04-20 PROCEDURE — 72148 MRI LUMBAR SPINE W/O DYE: CPT

## 2023-04-24 ENCOUNTER — TRANSFERRED RECORDS (OUTPATIENT)
Dept: HEALTH INFORMATION MANAGEMENT | Facility: CLINIC | Age: 63
End: 2023-04-24

## 2023-04-26 ENCOUNTER — MYC MEDICAL ADVICE (OUTPATIENT)
Dept: FAMILY MEDICINE | Facility: CLINIC | Age: 63
End: 2023-04-26
Payer: COMMERCIAL

## 2023-04-26 ENCOUNTER — MEDICAL CORRESPONDENCE (OUTPATIENT)
Dept: HEALTH INFORMATION MANAGEMENT | Facility: CLINIC | Age: 63
End: 2023-04-26
Payer: COMMERCIAL

## 2023-04-26 NOTE — TELEPHONE ENCOUNTER
Ok to schedule patient with PCP next week for a post-hospital check in a [place] or same-day slot.     Esperanza ARTEAGAN, RN

## 2023-04-27 ENCOUNTER — TELEPHONE (OUTPATIENT)
Dept: FAMILY MEDICINE | Facility: CLINIC | Age: 63
End: 2023-04-27
Payer: COMMERCIAL

## 2023-04-27 NOTE — TELEPHONE ENCOUNTER
The Home Care/Assisted Living/Nursing Facility is calling regarding an established patient.  Has the patient seen Home Care in the past or is currently residing in Assisted Living or Nursing Facility? No.     ALIREZA Rodarte calling from [facility] requesting the following orders that are NOT within the Home Care, Assisted Living or Nursing Home Eval and Treatment standing order and must be ordered by a Licensed Practitioner.    Preferred Call Back Number: [phone]    Verbal orders needed:  IV infusion for PICC- maintenance and lab draws.  One time a week for 5 weeks.  1-3 PRN    Routing to Licensed Practitioner (Provider) to review request and provide approval or recommendation.    Shanthi De Leon RN

## 2023-05-04 ENCOUNTER — TRANSFERRED RECORDS (OUTPATIENT)
Dept: HEALTH INFORMATION MANAGEMENT | Facility: CLINIC | Age: 63
End: 2023-05-04

## 2023-05-05 ENCOUNTER — MEDICAL CORRESPONDENCE (OUTPATIENT)
Dept: HEALTH INFORMATION MANAGEMENT | Facility: CLINIC | Age: 63
End: 2023-05-05
Payer: COMMERCIAL

## 2023-05-08 ENCOUNTER — OFFICE VISIT (OUTPATIENT)
Dept: FAMILY MEDICINE | Facility: CLINIC | Age: 63
End: 2023-05-08
Payer: COMMERCIAL

## 2023-05-08 ENCOUNTER — MYC MEDICAL ADVICE (OUTPATIENT)
Dept: FAMILY MEDICINE | Facility: CLINIC | Age: 63
End: 2023-05-08

## 2023-05-08 VITALS
BODY MASS INDEX: 32.39 KG/M2 | DIASTOLIC BLOOD PRESSURE: 69 MMHG | WEIGHT: 182.8 LBS | SYSTOLIC BLOOD PRESSURE: 124 MMHG | RESPIRATION RATE: 16 BRPM | OXYGEN SATURATION: 99 % | TEMPERATURE: 97.9 F | HEIGHT: 63 IN | HEART RATE: 102 BPM

## 2023-05-08 DIAGNOSIS — Z12.5 SCREENING PSA (PROSTATE SPECIFIC ANTIGEN): ICD-10-CM

## 2023-05-08 DIAGNOSIS — R30.0 DYSURIA: ICD-10-CM

## 2023-05-08 DIAGNOSIS — M46.20 SPINAL ABSCESS (H): Primary | ICD-10-CM

## 2023-05-08 DIAGNOSIS — M25.50 POLYARTHRALGIA: ICD-10-CM

## 2023-05-08 LAB
ALBUMIN UR-MCNC: NEGATIVE MG/DL
APPEARANCE UR: CLEAR
BILIRUB UR QL STRIP: NEGATIVE
COLOR UR AUTO: YELLOW
GLUCOSE UR STRIP-MCNC: NEGATIVE MG/DL
HGB UR QL STRIP: NEGATIVE
KETONES UR STRIP-MCNC: NEGATIVE MG/DL
LEUKOCYTE ESTERASE UR QL STRIP: NEGATIVE
NITRATE UR QL: NEGATIVE
PH UR STRIP: 6.5 [PH] (ref 5–7)
RBC #/AREA URNS AUTO: NORMAL /HPF
SP GR UR STRIP: <=1.005 (ref 1–1.03)
UROBILINOGEN UR STRIP-ACNC: 0.2 E.U./DL
WBC #/AREA URNS AUTO: NORMAL /HPF

## 2023-05-08 PROCEDURE — 81001 URINALYSIS AUTO W/SCOPE: CPT | Performed by: FAMILY MEDICINE

## 2023-05-08 PROCEDURE — 36415 COLL VENOUS BLD VENIPUNCTURE: CPT | Performed by: FAMILY MEDICINE

## 2023-05-08 PROCEDURE — G0103 PSA SCREENING: HCPCS | Performed by: FAMILY MEDICINE

## 2023-05-08 PROCEDURE — 99213 OFFICE O/P EST LOW 20 MIN: CPT | Performed by: FAMILY MEDICINE

## 2023-05-08 PROCEDURE — 86618 LYME DISEASE ANTIBODY: CPT | Performed by: FAMILY MEDICINE

## 2023-05-08 RX ORDER — METRONIDAZOLE 500 MG/1
500 TABLET ORAL
COMMUNITY
Start: 2023-04-25 | End: 2023-06-06

## 2023-05-08 RX ORDER — CEFTRIAXONE 2 G/1
2 INJECTION, POWDER, FOR SOLUTION INTRAMUSCULAR; INTRAVENOUS EVERY 24 HOURS
COMMUNITY
Start: 2023-04-22 | End: 2023-06-03

## 2023-05-08 ASSESSMENT — PAIN SCALES - GENERAL: PAINLEVEL: MILD PAIN (3)

## 2023-05-08 NOTE — LETTER
May 8, 2023      Cesar Foss  [address] [address] [address]        To Whom It May Concern:    Cesar Foss was seen in our clinic.   Patient has a spinal abscess and will need to work remotely while he is getting daily IV antibiotics from home. This might last 6-8 weeks total.      Sincerely,        Martin Howe MD

## 2023-05-08 NOTE — PROGRESS NOTES
ASSESSMENT / PLAN:  (M46.20) Spinal abscess (H)  (primary encounter diagnosis)  Comment: unclear source - urine/prostate?  Improving pain/ mobility and no fevers or chills   Plan: follow-up per ID and neurosurgery.  Continue IV/oral antibitoics and pain meds. Expected course and warning signs reviewed.     (Z12.5) Screening PSA (prostate specific antigen)    Plan: Prostate Specific Antigen Screen        Patient would like psa.  Consider prostatitis? On antibiotics.     (R30.0) Dysuria  Comment: resolved. Had for one day recently  Plan: UA with Microscopic reflex to Culture        Push fluids and continue avoid ALCOHOL. Expected course and warning signs reviewed.     (M25.50) Polyarthralgia  Comment: likely from abscess - improving  Plan: Lyme Disease Total Abs Bld with Reflex to         Confirm CLIA        Exercise and keep will hydrated. Patient would like lymes testing.       Subjective   Cesar is a 63 year old, presenting for the following health issues:  Hospital F/U  follow-up hospital for spinal abscess. Seen neurosurg and ID.  Given IV antibiotics via PICC line for 6 weeks. Follow-up ID 2 weeks and neurosurgery 6 weeks. Taking oxycocone and gabapentin. Once daily IV and oral antibiotic.    No ALCOHOL in past.   History htn, gerd/barrets, high cholesterol, hyperglycemia, fatty liver, obesity, low platelets and asthma  Per discharge summary:  Cesar Foss is a 63 y.o. male with known history of hypertension, obesity, obstructive sleep apnea, GERD with Haney's esophagus who presented because of a possible infection of the lumbar spine with a possible epidural abscess. He has been dealing with low back pain for the last month. He denies any lower extremity weakness numbness the pain radiates to the left hip and thigh. It is worse with acitivity but none at rest. No incontinence of urine or stool. He has no fever or chills either. No recent interventions like epidural injection. He does not use any IV drugs  "either. He had MRI done at the clinic and the MRI findings are suggestive for osteomyelitis of L4-L5 as well as possible epidural abscess. Because of this he was referred to the emergency room. The patient was seen by Neurosurgery, Infectious disease and Interventional Radiology. Neurosurgery had no current plan for neurosurgical intervention as patient is neurologically intact and imaging is not suggestive of instability but agreed with ID to have IR aspirate for culture which was performed 4/22. ID treated with Rocephin and Flagyl and culture remained no growth to date. ID was concerned for possible Strep and recommended 6 weeks of IV antibiotics via PICC line. PICC line was placed and the patient was discharged to home in improved condition. He will follow up with ID in 2 weeks for possible repeat MRI for surveillance. Follow up with Neurosurgery in 6 weeks        5/8/2023    10:57 AM   Additional Questions   Roomed by JOSEPHINE Isaac CMA     Forms 5/8/2023   Any forms needing to be completed Yes     HPI       Hospital Follow-up Visit:    Hospital/Nursing Home/IP Rehab Facility: [facility]  Date of Admission: 4/21/2023  Date of Discharge: 4/25/2023  Reason(s) for Admission: Spinal epidural abscess (Primary Dx);   Hyponatremia;   Hypokalemia;   Infection    Was your hospitalization related to COVID-19? No   Problems taking medications regularly:  None  Medication changes since discharge: None  Problems adhering to non-medication therapy:  None    Summary of hospitalization:  CareEverywhere information obtained and reviewed  Diagnostic Tests/Treatments reviewed.  Follow up needed: per ID  Other Healthcare Providers Involved in Patient s Care:         None  Update since discharge: improved.         Plan of care communicated with patient               Review of Systems         Objective    /69   Pulse 102   Temp 97.9  F (36.6  C) (Oral)   Resp 16   Ht 1.6 m (5' 3\")   Wt 82.9 kg (182 lb 12.8 oz)   SpO2 99%   BMI 32.38 " kg/m    Body mass index is 32.38 kg/m .  Physical Exam   GENERAL: healthy, alert and no distress  HENT: ear canals and TM's normal, nose and mouth without ulcers or lesions  NECK: no adenopathy, no asymmetry, masses, or scars and thyroid normal to palpation  RESP: lungs clear to auscultation - no rales, rhonchi or wheezes  CV: regular rate and rhythm, normal S1 S2, no S3 or S4, no murmur, click or rub, no peripheral edema and peripheral pulses strong  ABDOMEN: soft, nontender, no hepatosplenomegaly, no masses and bowel sounds normal  MS: no gross musculoskeletal defects noted, no edema  SKIN: no suspicious lesions or rashes  NEURO: Normal strength and tone, mentation intact and speech normal  PSYCH: mentation appears normal, affect normal/bright

## 2023-05-08 NOTE — TELEPHONE ENCOUNTER
Can we send a copy of last MRI lumbar spine report and can patient tell me what note needs to say in a little more details/how long/etc.  Martin Howe MD

## 2023-05-09 LAB
B BURGDOR IGG+IGM SER QL: 0.25
PSA SERPL-MCNC: 0.92 UG/L (ref 0–4)

## 2023-05-24 DIAGNOSIS — M54.16 LUMBAR RADICULOPATHY: ICD-10-CM

## 2023-05-24 RX ORDER — CYCLOBENZAPRINE HCL 10 MG
10 TABLET ORAL 2 TIMES DAILY PRN
Qty: 30 TABLET | Refills: 1 | Status: SHIPPED | OUTPATIENT
Start: 2023-05-24

## 2023-05-25 ENCOUNTER — MEDICAL CORRESPONDENCE (OUTPATIENT)
Dept: HEALTH INFORMATION MANAGEMENT | Facility: CLINIC | Age: 63
End: 2023-05-25

## 2023-06-01 ENCOUNTER — MEDICAL CORRESPONDENCE (OUTPATIENT)
Dept: HEALTH INFORMATION MANAGEMENT | Facility: CLINIC | Age: 63
End: 2023-06-01
Payer: COMMERCIAL

## 2023-06-01 ENCOUNTER — MYC MEDICAL ADVICE (OUTPATIENT)
Dept: FAMILY MEDICINE | Facility: CLINIC | Age: 63
End: 2023-06-01
Payer: COMMERCIAL

## 2023-06-01 DIAGNOSIS — M54.16 LUMBAR RADICULOPATHY: ICD-10-CM

## 2023-06-01 RX ORDER — HYDROCODONE BITARTRATE AND ACETAMINOPHEN 5; 325 MG/1; MG/1
1 TABLET ORAL
Qty: 10 TABLET | Refills: 0 | Status: SHIPPED | OUTPATIENT
Start: 2023-06-01 | End: 2023-09-19

## 2023-06-01 NOTE — TELEPHONE ENCOUNTER
Provider:  Please refill HYDROcodone-acetaminophen (NORCO) 5-325 MG tablet if appropriate.  Please give the PRESCRIPTION(S) to the TCs and route to P AN TC so they can put at the  if you do refill the Prescription(s).  Thank you. Cari Martinez R.N.      Per OV note dated 5/25/2023 from Cat Aguayo MD (I.D. provider)  is as follows:   Patient Instructions  - documented in this encounter  Patient Instructions  Cat Aguayo MD - 05/25/2023 9:00 AM CDT    Formatting of this note might be different from the original.  Problem  -- spine infection    Plan  -- recommend extending antibiotic till 6/23/23  -- follow up in 3 weeks  -- repeat MRI at end of therapy 6/20  -- follow up in 3 weeks

## 2023-06-02 ENCOUNTER — MEDICAL CORRESPONDENCE (OUTPATIENT)
Dept: HEALTH INFORMATION MANAGEMENT | Facility: CLINIC | Age: 63
End: 2023-06-02
Payer: COMMERCIAL

## 2023-06-02 ENCOUNTER — HEALTH MAINTENANCE LETTER (OUTPATIENT)
Age: 63
End: 2023-06-02

## 2023-06-02 ENCOUNTER — MYC MEDICAL ADVICE (OUTPATIENT)
Dept: FAMILY MEDICINE | Facility: CLINIC | Age: 63
End: 2023-06-02
Payer: COMMERCIAL

## 2023-06-02 NOTE — TELEPHONE ENCOUNTER
Patient declining to schedule a physical stating his 2/14/23 appointment with PCP was a physical. It was not coded as a physical but several chronic conditions were addressed.  Do you still want patient to schedule a physical?    Esperanza ARTEAGAN, RN

## 2023-06-05 ENCOUNTER — TRANSFERRED RECORDS (OUTPATIENT)
Dept: HEALTH INFORMATION MANAGEMENT | Facility: CLINIC | Age: 63
End: 2023-06-05
Payer: COMMERCIAL

## 2023-06-26 ENCOUNTER — MEDICAL CORRESPONDENCE (OUTPATIENT)
Dept: HEALTH INFORMATION MANAGEMENT | Facility: CLINIC | Age: 63
End: 2023-06-26
Payer: COMMERCIAL

## 2023-07-06 ENCOUNTER — MYC MEDICAL ADVICE (OUTPATIENT)
Dept: FAMILY MEDICINE | Facility: CLINIC | Age: 63
End: 2023-07-06
Payer: COMMERCIAL

## 2023-07-06 DIAGNOSIS — K22.719 BARRETT'S ESOPHAGUS WITH DYSPLASIA: Primary | ICD-10-CM

## 2023-07-10 ENCOUNTER — ANESTHESIA EVENT (OUTPATIENT)
Dept: GASTROENTEROLOGY | Facility: CLINIC | Age: 63
End: 2023-07-10
Payer: COMMERCIAL

## 2023-07-10 RX ORDER — BISACODYL 5 MG/1
TABLET, DELAYED RELEASE ORAL
Qty: 4 TABLET | Refills: 0 | Status: SHIPPED | OUTPATIENT
Start: 2023-07-10

## 2023-07-10 NOTE — ANESTHESIA PREPROCEDURE EVALUATION
Anesthesia Pre-Procedure Evaluation    Patient: Cesar Foss   MRN: [id number] : 1960        Procedure : Procedure(s):  COLONOSCOPY          Past Medical History:   Diagnosis Date    Allergic rhinitis due to animal dander     Allergy to mold spores     1/27/15 skin tests pos. for:  cat/dog/DM/M/T/G/W     Arthritis     Chondromalacia, knee, left     Chondromalacia, knee, right     Diagnostic skin and sensitization tests (aka ALLERGENS) 2015    1/27/15 skin tests pos. for:  cat/dog/DM/M/T/G/W    High cholesterol     House dust mite allergy     Hypertension goal BP (blood pressure) < 140/90     LBP (low back pain)     Migraines     Obesity     OREN (obstructive sleep apnea)     PONV (postoperative nausea and vomiting)     S/P arthroscopy of left knee 2017    Seasonal allergic conjunctivitis     Seasonal allergic rhinitis     Seizure disorder (H)     Thrombocytopenia (H) 2017    Unspecified asthma(493.90)       Past Surgical History:   Procedure Laterality Date    ARTHROSCOPY KNEE Left 2017    Procedure: ARTHROSCOPY KNEE;  Left knee arthroscopy, meniscectomy, debridement;  Surgeon: Rishabh Logan MD;  Location: [facility]    ARTHROSCOPY KNEE Right 2017    Procedure: ARTHROSCOPY KNEE;  Right knee arthroscopy, Chrondroplasty;  Surgeon: Rishabh Logan MD;  Location: [facility]    BARIATRIC SURGERY  2009    LAPAROSCOPIC GASTRIC BANDING 2009    COLONOSCOPY  2013    Procedure: COLONOSCOPY;  COLONOSCOPY SCREEN;  Surgeon: Jameel Patel MD;  Location: [facility]    COMBINED ESOPHAGOSCOPY, GASTROSCOPY, DUODENOSCOPY (EGD) WITH CO2 INSUFFLATION N/A 2021    Procedure: ESOPHAGOGASTRODUODENOSCOPY, WITH CO2 INSUFFLATION;  Surgeon: Kenneth Sellers MD;  Location: [facility]    ESOPHAGOSCOPY, GASTROSCOPY, DUODENOSCOPY (EGD), COMBINED N/A 2014    Procedure: COMBINED ESOPHAGOSCOPY, GASTROSCOPY, DUODENOSCOPY (EGD), BIOPSY SINGLE OR MULTIPLE;  Surgeon:  Jameel Patel MD;  Location:  OR    ESOPHAGOSCOPY, GASTROSCOPY, DUODENOSCOPY (EGD), COMBINED N/A 03/17/2021    Procedure: Esophagogastroduodenoscopy, With Biopsy;  Surgeon: Kenneth Sellers MD;  Location:  OR    LAPAROSCOPIC CHOLECYSTECTOMY N/A 09/10/2014    Procedure: LAPAROSCOPIC CHOLECYSTECTOMY;  Surgeon: Jameel Patel MD;  Location:  OR    LASIK  01/01/2001    ORTHOPEDIC SURGERY  08/20/2004    Left shoulder/clavicle [facility]- Dr Logan    TONSILLECTOMY  1968      No Known Allergies   Social History     Tobacco Use    Smoking status: Never    Smokeless tobacco: Never    Tobacco comments:     non smoking household   Substance Use Topics    Alcohol use: Yes     Comment: 2-3/night      Wt Readings from Last 1 Encounters:   05/08/23 82.9 kg (182 lb 12.8 oz)        Anesthesia Evaluation   Pt has had prior anesthetic. Type: General and MAC.    History of anesthetic complications  - PONV.      ROS/MED HX  ENT/Pulmonary:     (+) sleep apnea, allergic rhinitis, Mild Persistent, asthma     Neurologic:     (+) migraines, seizures,     Cardiovascular:     (+) Dyslipidemia hypertension-----    METS/Exercise Tolerance:     Hematologic: Comments: 1-5-2023 plats 94-thrombocytopenia      Musculoskeletal:   (+) arthritis,     GI/Hepatic: Comment: Haney's  S/p bariatric surgery    (+) GERD, liver disease,     Renal/Genitourinary:       Endo:     (+) Obesity,     Psychiatric/Substance Use:       Infectious Disease:       Malignancy:       Other:            Physical Exam    Airway  airway exam normal           Respiratory Devices and Support         Dental    unable to assess    (+) Minor Abnormalities - some fillings, tiny chips      Cardiovascular   cardiovascular exam normal          Pulmonary   pulmonary exam normal                OUTSIDE LABS:  CBC:   Lab Results   Component Value Date    WBC 5.8 01/05/2023    WBC 8.5 03/01/2022    HGB 14.4 01/05/2023    HGB 15.0 03/01/2022    HCT  39.8 (L) 01/05/2023    HCT 41.2 03/01/2022    PLT 94 (L) 01/05/2023     (L) 03/01/2022     BMP:   Lab Results   Component Value Date     01/05/2023     12/21/2021    POTASSIUM 3.3 (L) 01/05/2023    POTASSIUM 3.6 12/21/2021    CHLORIDE 101 01/05/2023    CHLORIDE 102 12/21/2021    CO2 30 01/05/2023    CO2 28 12/21/2021    BUN 10 01/05/2023    BUN 10 12/21/2021    CR 0.76 01/05/2023    CR 0.92 12/21/2021     (H) 01/05/2023     (H) 12/21/2021     COAGS: No results found for: PTT, INR, FIBR  POC: No results found for: BGM, HCG, HCGS  HEPATIC:   Lab Results   Component Value Date    ALBUMIN 4.1 01/05/2023    PROTTOTAL 7.6 01/05/2023    ALT 80 (H) 01/05/2023     (H) 01/05/2023    ALKPHOS 44 01/05/2023    BILITOTAL 0.9 01/05/2023     OTHER:   Lab Results   Component Value Date    A1C 5.4 12/21/2021    BARRETT 9.6 01/05/2023    PHOS 2.8 12/21/2021    TSH 1.92 07/17/2013       Anesthesia Plan    ASA Status:  3       Anesthesia Type: General.              Consents    Anesthesia Plan(s) and associated risks, benefits, and realistic alternatives discussed. Questions answered and patient/representative(s) expressed understanding.     - Discussed: Risks, Benefits and Alternatives for BOTH SEDATION and the PROCEDURE were discussed     - Discussed with:  Patient            Postoperative Care       PONV prophylaxis: Background Propofol Infusion     Comments:                MANDA Castellon CRNA

## 2023-07-11 ENCOUNTER — MYC MEDICAL ADVICE (OUTPATIENT)
Dept: FAMILY MEDICINE | Facility: CLINIC | Age: 63
End: 2023-07-11
Payer: COMMERCIAL

## 2023-07-11 NOTE — TELEPHONE ENCOUNTER
This RN attempting to route to pool that can answer patient's question about needing a covid test done prior to his procedure next week.   If incorrect pool, please forward to correct pool.     Esperanza Gibbs BSN, RN  [facility]/ [facility]

## 2023-07-13 ASSESSMENT — ENCOUNTER SYMPTOMS: SEIZURES: 1

## 2023-07-17 ENCOUNTER — ANESTHESIA (OUTPATIENT)
Dept: GASTROENTEROLOGY | Facility: CLINIC | Age: 63
End: 2023-07-17
Payer: COMMERCIAL

## 2023-07-17 ENCOUNTER — HOSPITAL ENCOUNTER (OUTPATIENT)
Facility: CLINIC | Age: 63
Discharge: HOME | End: 2023-07-17
Admitting: SURGERY
Payer: COMMERCIAL

## 2023-07-17 VITALS
WEIGHT: 183 LBS | DIASTOLIC BLOOD PRESSURE: 66 MMHG | RESPIRATION RATE: 16 BRPM | OXYGEN SATURATION: 100 % | TEMPERATURE: 96.9 F | SYSTOLIC BLOOD PRESSURE: 125 MMHG | HEIGHT: 63 IN | BODY MASS INDEX: 32.43 KG/M2 | HEART RATE: 96 BPM

## 2023-07-17 DIAGNOSIS — Z12.11 SPECIAL SCREENING FOR MALIGNANT NEOPLASMS, COLON: Primary | ICD-10-CM

## 2023-07-17 LAB
COLONOSCOPY: NORMAL
UPPER GI ENDOSCOPY: NORMAL

## 2023-07-17 PROCEDURE — 88305 TISSUE EXAM BY PATHOLOGIST: CPT | Mod: TC | Performed by: SURGERY

## 2023-07-17 PROCEDURE — 250N000009 HC RX 250: Performed by: NURSE ANESTHETIST, CERTIFIED REGISTERED

## 2023-07-17 PROCEDURE — 43239 EGD BIOPSY SINGLE/MULTIPLE: CPT | Performed by: SURGERY

## 2023-07-17 PROCEDURE — 250N000009 HC RX 250: Performed by: SURGERY

## 2023-07-17 PROCEDURE — 370N000017 HC ANESTHESIA TECHNICAL FEE, PER MIN: Performed by: SURGERY

## 2023-07-17 PROCEDURE — 250N000011 HC RX IP 250 OP 636: Performed by: NURSE ANESTHETIST, CERTIFIED REGISTERED

## 2023-07-17 PROCEDURE — 45378 DIAGNOSTIC COLONOSCOPY: CPT | Performed by: SURGERY

## 2023-07-17 PROCEDURE — 258N000003 HC RX IP 258 OP 636: Performed by: NURSE ANESTHETIST, CERTIFIED REGISTERED

## 2023-07-17 PROCEDURE — G0121 COLON CA SCRN NOT HI RSK IND: HCPCS | Performed by: SURGERY

## 2023-07-17 RX ORDER — PROPOFOL 10 MG/ML
INJECTION, EMULSION INTRAVENOUS PRN
Status: DISCONTINUED | OUTPATIENT
Start: 2023-07-17 | End: 2023-07-17

## 2023-07-17 RX ORDER — KETAMINE HYDROCHLORIDE 10 MG/ML
INJECTION INTRAMUSCULAR; INTRAVENOUS PRN
Status: DISCONTINUED | OUTPATIENT
Start: 2023-07-17 | End: 2023-07-17

## 2023-07-17 RX ORDER — GLYCOPYRROLATE 0.2 MG/ML
INJECTION, SOLUTION INTRAMUSCULAR; INTRAVENOUS PRN
Status: DISCONTINUED | OUTPATIENT
Start: 2023-07-17 | End: 2023-07-17

## 2023-07-17 RX ORDER — SODIUM CHLORIDE, SODIUM LACTATE, POTASSIUM CHLORIDE, CALCIUM CHLORIDE 600; 310; 30; 20 MG/100ML; MG/100ML; MG/100ML; MG/100ML
INJECTION, SOLUTION INTRAVENOUS CONTINUOUS
Status: DISCONTINUED | OUTPATIENT
Start: 2023-07-17 | End: 2023-07-17 | Stop reason: HOSPADM

## 2023-07-17 RX ORDER — ALBUTEROL SULFATE 0.83 MG/ML
2.5 SOLUTION RESPIRATORY (INHALATION) EVERY 4 HOURS PRN
Status: DISCONTINUED | OUTPATIENT
Start: 2023-07-17 | End: 2023-07-17 | Stop reason: HOSPADM

## 2023-07-17 RX ORDER — ONDANSETRON 2 MG/ML
4 INJECTION INTRAMUSCULAR; INTRAVENOUS EVERY 30 MIN PRN
Status: DISCONTINUED | OUTPATIENT
Start: 2023-07-17 | End: 2023-07-17 | Stop reason: HOSPADM

## 2023-07-17 RX ORDER — ONDANSETRON 4 MG/1
4 TABLET, ORALLY DISINTEGRATING ORAL EVERY 30 MIN PRN
Status: DISCONTINUED | OUTPATIENT
Start: 2023-07-17 | End: 2023-07-17 | Stop reason: HOSPADM

## 2023-07-17 RX ADMIN — PROPOFOL 50 MG: 10 INJECTION, EMULSION INTRAVENOUS at 07:35

## 2023-07-17 RX ADMIN — TOPICAL ANESTHETIC 2 SPRAY: 200 SPRAY DENTAL; PERIODONTAL at 07:32

## 2023-07-17 RX ADMIN — PROPOFOL 50 MG: 10 INJECTION, EMULSION INTRAVENOUS at 07:50

## 2023-07-17 RX ADMIN — PROPOFOL 50 MG: 10 INJECTION, EMULSION INTRAVENOUS at 07:46

## 2023-07-17 RX ADMIN — LIDOCAINE HYDROCHLORIDE 0.2 ML: 10 INJECTION, SOLUTION EPIDURAL; INFILTRATION; INTRACAUDAL; PERINEURAL at 07:07

## 2023-07-17 RX ADMIN — PROPOFOL 50 MG: 10 INJECTION, EMULSION INTRAVENOUS at 07:36

## 2023-07-17 RX ADMIN — SODIUM CHLORIDE, POTASSIUM CHLORIDE, SODIUM LACTATE AND CALCIUM CHLORIDE: 600; 310; 30; 20 INJECTION, SOLUTION INTRAVENOUS at 07:06

## 2023-07-17 RX ADMIN — PROPOFOL 50 MG: 10 INJECTION, EMULSION INTRAVENOUS at 07:34

## 2023-07-17 RX ADMIN — KETAMINE HYDROCHLORIDE 50 MG: 10 INJECTION, SOLUTION INTRAMUSCULAR; INTRAVENOUS at 07:35

## 2023-07-17 RX ADMIN — PROPOFOL 50 MG: 10 INJECTION, EMULSION INTRAVENOUS at 07:40

## 2023-07-17 RX ADMIN — GLYCOPYRROLATE 0.2 MG: 0.2 INJECTION, SOLUTION INTRAMUSCULAR; INTRAVENOUS at 07:32

## 2023-07-17 RX ADMIN — PROPOFOL 100 MG: 10 INJECTION, EMULSION INTRAVENOUS at 07:45

## 2023-07-17 ASSESSMENT — ACTIVITIES OF DAILY LIVING (ADL): ADLS_ACUITY_SCORE: 35

## 2023-07-17 NOTE — ANESTHESIA POSTPROCEDURE EVALUATION
Patient: Cesar Foss    Procedure: Procedure(s):  COLONOSCOPY  Esophagoscopy, gastroscopy, duodenoscopy (EGD), combined       Anesthesia Type:  General    Note:  Disposition: Outpatient   Postop Pain Control: Uneventful            Sign Out: Well controlled pain   PONV: No   Neuro/Psych: Uneventful            Sign Out: Acceptable/Baseline neuro status   Airway/Respiratory: Uneventful            Sign Out: Acceptable/Baseline resp. status   CV/Hemodynamics: Uneventful            Sign Out: Acceptable CV status; No obvious hypovolemia; No obvious fluid overload   Other NRE: NONE   DID A NON-ROUTINE EVENT OCCUR? No           Last vitals:  Vitals Value Taken Time   /70 07/17/23 0830   Temp     Pulse 104 07/17/23 0830   Resp 16 07/17/23 0810   SpO2 99 % 07/17/23 0831   Vitals shown include unvalidated device data.    Electronically Signed By: MANDA Roque CRNA  July 17, 2023  8:34 AM

## 2023-07-17 NOTE — H&P
General Surgery H&P  Cesar Foss MRN# [id number]   Age/Sex: 63 year old male YOB: 1960     Reason for visit: Colonoscopy       Referring physician: Martin Howe MD                   Assessment and Plan:   Assessment:  1.  Colonoscopy  2.  Hx of jacobs's esophagus.   3.  Hx of lap band stricture - dilated in 2021    Plan:  -To the OR for colonoscopy and EGD  -Risk and benefits of procedure explained detail to the patient.  Risks include infection, bleeding, damage to the surrounding structures and possible perforation of the hollow organs.  Patient verbalized understanding provided consent to undergo the procedure above.          Chief Complaint:   Presenting for colonoscopy     History is obtained from the patient    HPI:   Cesar Foss is a 63 year old male who presents for colonoscopy.  Patient tolerated the prep well.  The patient's last colonoscopy was 2013.  Family history shows no history of colon cancer.  Hx of Jacobs's esophagus.  No new complaints.           Past Medical History:     Past Medical History:   Diagnosis Date     Allergic rhinitis due to animal dander      Allergy to mold spores     1/27/15 skin tests pos. for:  cat/dog/DM/M/T/G/W      Arthritis      Chondromalacia, knee, left      Chondromalacia, knee, right      Diagnostic skin and sensitization tests (aka ALLERGENS) 01/27/2015    1/27/15 skin tests pos. for:  cat/dog/DM/M/T/G/W     High cholesterol      House dust mite allergy      Hypertension goal BP (blood pressure) < 140/90      LBP (low back pain)      Migraines      Obesity      OREN (obstructive sleep apnea)      PONV (postoperative nausea and vomiting)      S/P arthroscopy of left knee 08/01/2017     Seasonal allergic conjunctivitis      Seasonal allergic rhinitis      Seizure disorder (H)      Thrombocytopenia (H) 07/11/2017     Unspecified asthma(493.90)               Past Surgical History:     Past Surgical History:   Procedure Laterality Date     ARTHROSCOPY  KNEE Left 07/21/2017    Procedure: ARTHROSCOPY KNEE;  Left knee arthroscopy, meniscectomy, debridement;  Surgeon: Rishabh Logan MD;  [facility]: [facility]     ARTHROSCOPY KNEE Right 09/01/2017    Procedure: ARTHROSCOPY KNEE;  Right knee arthroscopy, Chrondroplasty;  Surgeon: Rishabh Logan MD;  Location: [facility]     BARIATRIC SURGERY  02/23/2009    LAPAROSCOPIC GASTRIC BANDING 02/23/2009     COLONOSCOPY  04/01/2013    Procedure: COLONOSCOPY;  COLONOSCOPY SCREEN;  Surgeon: Jameel Patel MD;  Location[facility]     COMBINED ESOPHAGOSCOPY, GASTROSCOPY, DUODENOSCOPY (EGD) WITH CO2 INSUFFLATION N/A 03/17/2021    Procedure: ESOPHAGOGASTRODUODENOSCOPY, WITH CO2 INSUFFLATION;  Surgeon: Kenneth Sellers MD;  [facility]     ESOPHAGOSCOPY, GASTROSCOPY, DUODENOSCOPY (EGD), COMBINED N/A 08/21/2014    Procedure: COMBINED ESOPHAGOSCOPY, GASTROSCOPY, DUODENOSCOPY (EGD), BIOPSY SINGLE OR MULTIPLE;  Surgeon: Jameel Patel MD;  [facility]     ESOPHAGOSCOPY, GASTROSCOPY, DUODENOSCOPY (EGD), COMBINED N/A 03/17/2021    Procedure: Esophagogastroduodenoscopy, With Biopsy;  Surgeon: Kenneth Sellers MD;  [facility]     LAPAROSCOPIC CHOLECYSTECTOMY N/A 09/10/2014    Procedure: LAPAROSCOPIC CHOLECYSTECTOMY;  Surgeon: Jameel Patel MD;  Location: [facility]     LASIK  01/01/2001     ORTHOPEDIC SURGERY  08/20/2004    Left shoulder/clavicle [facility]     TONSILLECTOMY  1968             Social History:    reports that he has never smoked. He has never used smokeless tobacco. He reports current alcohol use. He reports that he does not use drugs.           Family History:     Family History   Problem Relation Age of Onset     Eye Disorder Mother      Heart Disease Mother         60yo MI.     Lipids Mother      Macular Degeneration Mother      Diabetes Mother      Hypertension Mother      Lipids Father      Cancer Father      Hypertension Father      Alcohol/Drug  Brother      Diabetes Sister         hyperglycemic     Obesity Daughter      Glaucoma No family hx of      Cerebrovascular Disease No family hx of      Thyroid Disease No family hx of               Allergies:   No Known Allergies           Medications:     Prior to Admission medications    Medication Sig Start Date End Date Taking? Authorizing Provider   albuterol (PROAIR HFA) 108 (90 Base) MCG/ACT inhaler INHALE 2 PUFFS INTO THE LUNGS EVERY 6 HOURS AS NEEDED FOR SHORTNESS OF BREATH / DYSPNEA OR WHEEZING 2/14/23  Yes Martin Howe MD   amLODIPine (NORVASC) 10 MG tablet TAKE 1 TABLET BY MOUTH FOR BLOOD PRESSURE IN THE PM Strength: 10 mg 2/14/23  Yes Martin Howe MD   bisacodyl (DULCOLAX) 5 MG EC tablet Take 2 tablets at 3 pm the day before your procedure. If your procedure is before 11 am, take 2 additional tablets at 11 pm. If your procedure is after 11 am, take 2 additional tablets at 6 am. For additional instructions refer to your colonoscopy prep instructions. 7/10/23  Yes Shay Toussaint,    budesonide (PULMICORT FLEXHALER) 180 MCG/ACT inhaler Inhale 1 puff into the lungs 2 times daily Replaces flovent for insurance 2/14/23  Yes Martin Howe MD   buPROPion (WELLBUTRIN SR) 150 MG 12 hr tablet TAKE 1 TABLET BY MOUTH EVERY MORNING FOR 7 DAYS, THEN 1 TABLET TWICE A DAY FOR WEIGHT LOSS/ENERGY 1/9/23  Yes Martin Howe MD   cyclobenzaprine (FLEXERIL) 10 MG tablet TAKE 1 TABLET (10 MG) BY MOUTH 2 TIMES DAILY AS NEEDED FOR MUSCLE SPASMS 5/24/23  Yes Martin Howe MD   cyclobenzaprine (FLEXERIL) 10 MG tablet Take 1 tablet (10 mg) by mouth 3 times daily as needed for muscle spasms 3/15/23  Yes Martin Howe MD   HYDROcodone-acetaminophen (NORCO) 5-325 MG tablet Take 1 tablet by mouth nightly as needed for pain Avoid with ALCOHOL 6/1/23  Yes Martin Howe MD   lisinopril-hydrochlorothiazide (ZESTORETIC) 20-25 MG tablet Take 1 tablet by mouth daily This is double dosage for blood pressure  "in AM 2/14/23  Yes Martin Howe MD   omeprazole (PRILOSEC) 20 MG DR capsule Take 1 capsule (20 mg) by mouth daily 2/14/23  Yes Martin Howe MD   polyethylene glycol (GOLYTELY) 236 g suspension The night before the exam at 6 pm drink an 8-ounce glass every 15 minutes until the jug is half empty. If you arrive before 11 AM: Drink the other half of the Golytely jug at 11 PM night before procedure. If you arrive after 11 AM: Drink the other half of the Golytely jug at 6 AM day of procedure. For additional instructions refer to your colonoscopy prep instructions. 7/10/23  Yes Shay Toussaint,    simvastatin (ZOCOR) 10 MG tablet Take 1 tablet (10 mg) by mouth At Bedtime For cholesterol 2/14/23  Yes Martin Howe MD   order for DME CPAP supplies:  Mask, hose, tank, filters 8/16/17   Ivett Rubio MD              Review of Systems:   A 12 point Review of Systems is negative other than noted in the HPI            Physical Exam:     Patient Vitals for the past 24 hrs:   BP Temp Temp src Pulse SpO2 Height Weight   07/17/23 0640 124/73 98.2  F (36.8  C) Oral 105 98 % 1.6 m (5' 3\") 83 kg (183 lb)        No intake or output data in the 24 hours ending 07/17/23 0656   Constitutional:   awake, alert, cooperative, no apparent distress, and appears stated age       Eyes:   PERRL, conjunctiva/corneas clear, EOM's intact; no scleral edema or icterus noted        ENT:   Normocephalic, without obvious abnormality, atraumatic, Lips, mucosa, and tongue normal        Hematologic / Lymphatic:   No lymphadenopathy       Lungs:   Normal respiratory effort, no accessory muscle use, breath sounds bilaterally on auscultation       Cardiovascular:   Regular rate and rhythm       Abdomen:   Soft, nondistended, nontender to palpation       Musculoskeletal:   No obvious swelling, bruising or deformity       Skin:   Skin color and texture normal for patient, no rashes or lesions              Data:          Shay Toussaint, " DO      Shay Toussaint DO  General Surgeon  [facility]  Surgery [address]  [place] [address]?  Office: [phone]  Employed by - [facility]  Pager: [phone]

## 2023-07-17 NOTE — DISCHARGE INSTRUCTIONS
Colonoscopy  Colonoscopy is a test to view the inside of your lower digestive tract (colon and rectum). Sometimes it can show the last part of the small intestine (ileum). During the test, small pieces of tissue may be removed for testing. This is called a biopsy. Small growths, such as polyps, may be removed.      A camera attached to a flexible tube with a viewing lens is used to take video pictures.     Why is colonoscopy done?  The test is done to:     Look for colon cancer  Help find the source of belly (abdominal) pain or bleeding  Help find the cause of changes in bowel habits     It may be needed every 5 to 10 years (or more often). This depends on factors such as your:  Age  Health history  Family health history  Symptoms  Results from any past colonoscopy  Risks and possible complications  These include:  Bleeding               A hole (puncture) or tear in the colon   Risks of anesthesia  A cancer sore (lesion) not being seen or fully removed  Getting ready   To prepare for the test:  Talk with your healthcare provider about the risks of the test (see below). You can also ask about alternatives to the test.  Tell your healthcare provider what medicines, vitamins, herbs, and supplements you take. Tell them what health conditions and allergies you have.  Make sure your rectum and colon are empty for the test. This is done by following the diet and bowel prep instructions exactly. If you don t follow the directions, the test may need to be rescheduled.  During the test   The test is usually done in the hospital on an outpatient basis. Or it's done at an outpatient clinic. Outpatient means you go home the same day. The procedure usually takes about 30 minutes. During that time:  You're given relaxing (sedating) medicine through an IV (intravenous) line. You may be drowsy. Or you may be fully asleep.  The healthcare provider will first give you a physical exam. This is done to check for anal and rectal  problems.  Then the anus is lubricated and the scope is inserted.  If you're awake, you may feel like you need to have a bowel movement. You may feel pressure as air is pumped into the colon. It s OK to pass gas during the procedure.  Biopsy, polyp removal, or other treatments may be done during the test.  After the test   You may have gas right after the test. It can help to try to pass it to help prevent later bloating. Your healthcare provider may talk with you about the results right away. Or you may need to schedule a follow-up visit to talk about the results.  After the test, you can go back to your normal eating and other activities. You may be tired from the sedation and need to rest for a few hours. Ask your provider when you can take your regular medicines again.  When to call your healthcare provider  Call your healthcare provider right away if you have any of these:  Severe belly (abdominal) pain  Fever of 100.4 F (38 C) or higher, or as advised by your provider  Rectal bleeding or bloody bowel movements  Nausea or vomiting  Weakness or dizziness  [employer] last reviewed this educational content on 9/1/2021 2000-2023 [employer]. All rights reserved. This information is not intended as a substitute for professional medical care. Always follow your healthcare professional's instructions.        What Is Haney Esophagus?  You have Haney esophagus. This means that there have been changes to the lining of the esophagus near the stomach. The changes may have been caused by the acid reflux that happens with GERD (gastroesophageal reflux disease). The changed lining isn't cancer. But it may increase your chances of developing cancer later on.      When you have GERD   The esophagus is the tube that carries food and liquid from the mouth to the stomach. Your lower esophageal sphincter (LES) is a 1-way valve at the top of the stomach. It keeps food and stomach acid from flowing backward. If the  LES is weakened, food and stomach acid flow back (reflux) into your esophagus. If this happens often, the condition is called GERD.   Changes in the lining   The stomach is kept safe from its own acid by a special lining. The esophagus isn t meant to contact stomach acid. But a small amount of acid reflux is normal and occurs in many people. With GERD, acid flows back into the esophagus often. This damages the esophagus. In response to the damage, new tissue forms that isn't normal. This is Haney esophagus. The new tissue may keep changing. This is why it's more likely to become cancer in the future.   Preventing further damage   Your healthcare provider may suggest regular tests to keep track of changes in the esophagus. This often includes an endoscopy, when a flexible lighted scope is placed through the mouth into the esophagus. Tissue samples (biopsies) can be taken of the abnormal areas. You're sedated with an IV (intravenous) medicine for comfort.   Your provider may also suggest ways for you to control GERD. This includes lifestyle changes such as quitting smoking and staying at a healthy weight. Medicine or even surgery may also be suggested. This should help keep your Haney esophagus from getting worse. If it does get worse, more treatment is often done during an endoscopy.   Symptoms of GERD  Symptoms include:  Heartburn  Sour-tasting fluid backing up into your mouth  Frequent burping or belching  Symptoms that get worse after you eat, bend over, or lie down  Coughing repeatedly to clear your throat  Hoarseness  Trouble swallowing  [employer] last reviewed this educational content on 10/1/2021    4819-5878 [employer]. All rights reserved. This information is not intended as a substitute for professional medical care. Always follow your healthcare professional's instructions.

## 2023-07-17 NOTE — ANESTHESIA CARE TRANSFER NOTE
Patient: Cesar Foss    Procedure: Procedure(s):  COLONOSCOPY  Esophagoscopy, gastroscopy, duodenoscopy (EGD), combined       Diagnosis: Colon cancer screening [Z12.11]  Diagnosis Additional Information: No value filed.    Anesthesia Type:   General     Note:    Oropharynx: oropharynx clear of all foreign objects and spontaneously breathing  Level of Consciousness: awake  Oxygen Supplementation: room air    Independent Airway: airway patency satisfactory and stable  Dentition: dentition unchanged  Vital Signs Stable: post-procedure vital signs reviewed and stable  Report to RN Given: handoff report given  Patient transferred to: Phase II    Handoff Report: Identifed the Patient, Identified the Reponsible Provider, Reviewed the pertinent medical history, Discussed the surgical course, Reviewed Intra-OP anesthesia mangement and issues during anesthesia, Set expectations for post-procedure period and Allowed opportunity for questions and acknowledgement of understanding      Vitals:  Vitals Value Taken Time   BP     Temp     Pulse     Resp     SpO2         Electronically Signed By: MANDA Roque CRNA  July 17, 2023  8:04 AM

## 2023-07-20 LAB
PATH REPORT.COMMENTS IMP SPEC: NORMAL
PATH REPORT.FINAL DX SPEC: NORMAL
PATH REPORT.GROSS SPEC: NORMAL
PATH REPORT.MICROSCOPIC SPEC OTHER STN: NORMAL
PATH REPORT.RELEVANT HX SPEC: NORMAL
PHOTO IMAGE: NORMAL

## 2023-07-20 PROCEDURE — 88342 IMHCHEM/IMCYTCHM 1ST ANTB: CPT | Mod: 26 | Performed by: PATHOLOGY

## 2023-07-20 PROCEDURE — 88305 TISSUE EXAM BY PATHOLOGIST: CPT | Mod: 26 | Performed by: PATHOLOGY

## 2023-07-26 ENCOUNTER — MYC MEDICAL ADVICE (OUTPATIENT)
Dept: FAMILY MEDICINE | Facility: CLINIC | Age: 63
End: 2023-07-26

## 2023-08-09 ENCOUNTER — MYC MEDICAL ADVICE (OUTPATIENT)
Dept: FAMILY MEDICINE | Facility: CLINIC | Age: 63
End: 2023-08-09
Payer: COMMERCIAL

## 2023-08-10 ENCOUNTER — E-VISIT (OUTPATIENT)
Dept: FAMILY MEDICINE | Facility: CLINIC | Age: 63
End: 2023-08-10
Payer: COMMERCIAL
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
## 2023-08-10 DIAGNOSIS — K21.00 GASTROESOPHAGEAL REFLUX DISEASE WITH ESOPHAGITIS, UNSPECIFIED WHETHER HEMORRHAGE: ICD-10-CM

## 2023-08-10 PROCEDURE — 99421 OL DIG E/M SVC 5-10 MIN: CPT | Performed by: FAMILY MEDICINE

## 2023-08-14 ENCOUNTER — LAB (OUTPATIENT)
Dept: LAB | Facility: CLINIC | Age: 63
End: 2023-08-14
Payer: COMMERCIAL
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
## 2023-08-14 PROCEDURE — 36415 COLL VENOUS BLD VENIPUNCTURE: CPT

## 2023-08-14 PROCEDURE — 86618 LYME DISEASE ANTIBODY: CPT

## 2023-08-15 LAB — B BURGDOR IGG+IGM SER QL: 0.08

## 2023-08-29 ENCOUNTER — MYC MEDICAL ADVICE (OUTPATIENT)
Dept: FAMILY MEDICINE | Facility: CLINIC | Age: 63
End: 2023-08-29
Payer: COMMERCIAL

## 2023-08-29 DIAGNOSIS — K21.00 GASTROESOPHAGEAL REFLUX DISEASE WITH ESOPHAGITIS, UNSPECIFIED WHETHER HEMORRHAGE: ICD-10-CM

## 2023-08-30 ENCOUNTER — TRANSFERRED RECORDS (OUTPATIENT)
Dept: HEALTH INFORMATION MANAGEMENT | Facility: CLINIC | Age: 63
End: 2023-08-30
Payer: COMMERCIAL

## 2023-09-01 ENCOUNTER — MYC MEDICAL ADVICE (OUTPATIENT)
Dept: FAMILY MEDICINE | Facility: CLINIC | Age: 63
End: 2023-09-01
Payer: COMMERCIAL

## 2023-09-19 ENCOUNTER — OFFICE VISIT (OUTPATIENT)
Dept: FAMILY MEDICINE | Facility: CLINIC | Age: 63
End: 2023-09-19
Payer: COMMERCIAL

## 2023-09-19 VITALS
HEIGHT: 63 IN | HEART RATE: 98 BPM | OXYGEN SATURATION: 99 % | SYSTOLIC BLOOD PRESSURE: 121 MMHG | DIASTOLIC BLOOD PRESSURE: 72 MMHG | BODY MASS INDEX: 33.38 KG/M2 | RESPIRATION RATE: 20 BRPM | TEMPERATURE: 98.4 F | WEIGHT: 188.4 LBS

## 2023-09-19 DIAGNOSIS — M25.50 POLYARTHRALGIA: ICD-10-CM

## 2023-09-19 DIAGNOSIS — R53.83 OTHER FATIGUE: Primary | ICD-10-CM

## 2023-09-19 DIAGNOSIS — R51.9 NONINTRACTABLE HEADACHE, UNSPECIFIED CHRONICITY PATTERN, UNSPECIFIED HEADACHE TYPE: ICD-10-CM

## 2023-09-19 DIAGNOSIS — M46.20 SPINAL ABSCESS (H): ICD-10-CM

## 2023-09-19 LAB
ERYTHROCYTE [DISTWIDTH] IN BLOOD BY AUTOMATED COUNT: 13 % (ref 10–15)
HCT VFR BLD AUTO: 39.8 % (ref 40–53)
HGB BLD-MCNC: 14 G/DL (ref 13.3–17.7)
MCH RBC QN AUTO: 31.2 PG (ref 26.5–33)
MCHC RBC AUTO-ENTMCNC: 35.2 G/DL (ref 31.5–36.5)
MCV RBC AUTO: 89 FL (ref 78–100)
PLATELET # BLD AUTO: 100 10E3/UL (ref 150–450)
RBC # BLD AUTO: 4.49 10E6/UL (ref 4.4–5.9)
WBC # BLD AUTO: 6.1 10E3/UL (ref 4–11)

## 2023-09-19 PROCEDURE — 84443 ASSAY THYROID STIM HORMONE: CPT | Performed by: FAMILY MEDICINE

## 2023-09-19 PROCEDURE — 90471 IMMUNIZATION ADMIN: CPT | Performed by: FAMILY MEDICINE

## 2023-09-19 PROCEDURE — 90682 RIV4 VACC RECOMBINANT DNA IM: CPT | Performed by: FAMILY MEDICINE

## 2023-09-19 PROCEDURE — 36415 COLL VENOUS BLD VENIPUNCTURE: CPT | Performed by: FAMILY MEDICINE

## 2023-09-19 PROCEDURE — 99214 OFFICE O/P EST MOD 30 MIN: CPT | Mod: 25 | Performed by: FAMILY MEDICINE

## 2023-09-19 PROCEDURE — 85027 COMPLETE CBC AUTOMATED: CPT | Performed by: FAMILY MEDICINE

## 2023-09-19 ASSESSMENT — PAIN SCALES - GENERAL: PAINLEVEL: MILD PAIN (3)

## 2023-09-19 ASSESSMENT — ASTHMA QUESTIONNAIRES: ACT_TOTALSCORE: 23

## 2023-09-19 NOTE — PROGRESS NOTES
ASSESSMENT / PLAN:  (R53.83) Other fatigue  (primary encounter diagnosis)  Comment: unclear etiolkogy  Plan: CBC with platelets, TSH with free T4 reflex,         Adult Rheumatology  Referral, Adult         Neurology  Referral        Continue exercise and avoid pop.ALCOHOL. await labs. Endo input?    (R51.9) Nonintractable headache, unspecified chronicity pattern, unspecified headache type  Comment: tension?  Plan: Adult Neurology  Referral        Patient interested in seeing neurology with history of fogginess. Prn tylenol. To ER if a lot worse or new symptoms. Expected course and warning signs reviewed. Call/email with questions/concerns      (M46.20) Spinal abscess (H)  Comment: clinically better and ID signed off  Plan: Adult Neurology  Referral        Consider back to ID if worse/symptoms. Follow-up per neurosurg. Patient would like to see neurology too.    (M25.50) Polyarthralgia  Comment: ? OA vs inflammatory  Plan: Adult Rheumatology  Referral        Consider 2 weeks of doxy vs trial of prednisone?  Follow-up rheum. Expected course and warning signs reviewed. Call/email with questions/concerns    Follow-up winter for cpe with previsit fasting labs.     Subjective   Cesar is a 63 year old, presenting for the following health issues:  History htn, gerd/barrets, high cholesterol, hyperglycemia, fatty liver, obesity, low platelets and asthma   Had spinal abscess in spring.   Seen neurosurgery recently and needs follow-up with ID. History tick bite in past but negative lyme test x2 since. Repeat xray with neurosurg in 2months and ID signed off on infection.   2 episodes - fogginess - goes to bed and wakes up ok.   No focal weakness/tingling. No slurred speech. Some headaches.   No neurology in past but interested.  No fevers or chills. Still with joint pains. No joint swelling.   Fatigued. Keith - cpap.  Girlfriend going ok.   Stopped ALCOHOL and no pop..   Exercise -  "active.   Back pain much better then before.   No rashes. No urine changes. No nausea, vomiting or diarrhea or black/bloody stools.     Back Pain        9/19/2023    10:58 AM   Additional Questions   Roomed by JOSEPHINE Isaac CMA       HPI         Objective    /72   Pulse 98   Temp 98.4  F (36.9  C) (Oral)   Resp 20   Ht 1.6 m (5' 3\")   Wt 85.5 kg (188 lb 6.4 oz)   SpO2 99%   BMI 33.37 kg/m    Body mass index is 33.37 kg/m .  Physical Exam   GENERAL: healthy, alert and no distress  EYES: Eyes grossly normal to inspection, PERRL and conjunctivae and sclerae normal  HENT: ear canals and TM's normal, nose and mouth without ulcers or lesions  NECK: no adenopathy, no asymmetry, masses, or scars and thyroid normal to palpation  RESP: lungs clear to auscultation - no rales, rhonchi or wheezes  CV: regular rate and rhythm, normal S1 S2, no S3 or S4, no murmur, click or rub, no peripheral edema and peripheral pulses strong  ABDOMEN: soft, nontender, no hepatosplenomegaly, no masses and bowel sounds normal  MS: no gross musculoskeletal defects noted, no edema  SKIN: no suspicious lesions or rashes  NEURO: Normal strength and tone, mentation intact and speech normal  PSYCH: mentation appears normal, affect normal/bright  LYMPH: no cervical, supraclavicular, axillary adenopathy            "

## 2023-09-20 ENCOUNTER — MYC MEDICAL ADVICE (OUTPATIENT)
Dept: FAMILY MEDICINE | Facility: CLINIC | Age: 63
End: 2023-09-20
Payer: COMMERCIAL
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
## 2023-09-20 LAB — TSH SERPL DL<=0.005 MIU/L-ACNC: 1.44 UIU/ML (ref 0.3–4.2)

## 2023-09-20 RX ORDER — DOXYCYCLINE HYCLATE 100 MG
100 TABLET ORAL 2 TIMES DAILY
Qty: 56 TABLET | Refills: 0 | Status: SHIPPED | OUTPATIENT
Start: 2023-09-20 | End: 2023-10-20

## 2023-10-20 ENCOUNTER — MYC MEDICAL ADVICE (OUTPATIENT)
Dept: FAMILY MEDICINE | Facility: CLINIC | Age: 63
End: 2023-10-20
Payer: COMMERCIAL
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
## 2023-10-20 RX ORDER — DOXYCYCLINE HYCLATE 100 MG
100 TABLET ORAL 2 TIMES DAILY
Qty: 28 TABLET | Refills: 0 | Status: SHIPPED | OUTPATIENT
Start: 2023-10-20 | End: 2023-11-03

## 2023-11-13 ENCOUNTER — TRANSFERRED RECORDS (OUTPATIENT)
Dept: HEALTH INFORMATION MANAGEMENT | Facility: CLINIC | Age: 63
End: 2023-11-13
Payer: COMMERCIAL

## 2023-11-29 ENCOUNTER — TRANSFERRED RECORDS (OUTPATIENT)
Dept: HEALTH INFORMATION MANAGEMENT | Facility: CLINIC | Age: 63
End: 2023-11-29
Payer: COMMERCIAL

## 2023-12-06 ENCOUNTER — TRANSFERRED RECORDS (OUTPATIENT)
Dept: HEALTH INFORMATION MANAGEMENT | Facility: CLINIC | Age: 63
End: 2023-12-06
Payer: COMMERCIAL

## 2024-02-01 ENCOUNTER — MYC MEDICAL ADVICE (OUTPATIENT)
Dept: FAMILY MEDICINE | Facility: CLINIC | Age: 64
End: 2024-02-01
Payer: COMMERCIAL

## 2024-02-01 DIAGNOSIS — E78.5 HYPERLIPIDEMIA LDL GOAL <130: Chronic | ICD-10-CM

## 2024-02-01 DIAGNOSIS — Z00.00 PREVENTATIVE HEALTH CARE: Primary | ICD-10-CM

## 2024-02-01 DIAGNOSIS — I10 ESSENTIAL HYPERTENSION WITH GOAL BLOOD PRESSURE LESS THAN 140/90: Chronic | ICD-10-CM

## 2024-02-11 DIAGNOSIS — E78.5 HYPERLIPIDEMIA LDL GOAL <130: ICD-10-CM

## 2024-02-11 DIAGNOSIS — I10 ESSENTIAL HYPERTENSION WITH GOAL BLOOD PRESSURE LESS THAN 140/90: ICD-10-CM

## 2024-02-12 RX ORDER — SIMVASTATIN 10 MG
10 TABLET ORAL AT BEDTIME
Qty: 90 TABLET | Refills: 3 | Status: SHIPPED | OUTPATIENT
Start: 2024-02-12 | End: 2024-06-27

## 2024-02-12 RX ORDER — LISINOPRIL AND HYDROCHLOROTHIAZIDE 20; 25 MG/1; MG/1
1 TABLET ORAL DAILY
Qty: 90 TABLET | Refills: 0 | Status: SHIPPED | OUTPATIENT
Start: 2024-02-12 | End: 2024-05-14

## 2024-03-12 SDOH — HEALTH STABILITY: PHYSICAL HEALTH: ON AVERAGE, HOW MANY DAYS PER WEEK DO YOU ENGAGE IN MODERATE TO STRENUOUS EXERCISE (LIKE A BRISK WALK)?: 3 DAYS

## 2024-03-12 SDOH — HEALTH STABILITY: PHYSICAL HEALTH: ON AVERAGE, HOW MANY MINUTES DO YOU ENGAGE IN EXERCISE AT THIS LEVEL?: 30 MIN

## 2024-03-12 ASSESSMENT — SOCIAL DETERMINANTS OF HEALTH (SDOH): HOW OFTEN DO YOU GET TOGETHER WITH FRIENDS OR RELATIVES?: THREE TIMES A WEEK

## 2024-03-14 ENCOUNTER — LAB (OUTPATIENT)
Dept: LAB | Facility: CLINIC | Age: 64
End: 2024-03-14
Payer: COMMERCIAL

## 2024-03-14 DIAGNOSIS — E78.5 HYPERLIPIDEMIA LDL GOAL <130: Chronic | ICD-10-CM

## 2024-03-14 DIAGNOSIS — Z00.00 PREVENTATIVE HEALTH CARE: ICD-10-CM

## 2024-03-14 LAB
ERYTHROCYTE [DISTWIDTH] IN BLOOD BY AUTOMATED COUNT: 13.3 % (ref 10–15)
HCT VFR BLD AUTO: 38.7 % (ref 40–53)
HGB BLD-MCNC: 13.4 G/DL (ref 13.3–17.7)
MCH RBC QN AUTO: 32.5 PG (ref 26.5–33)
MCHC RBC AUTO-ENTMCNC: 34.6 G/DL (ref 31.5–36.5)
MCV RBC AUTO: 94 FL (ref 78–100)
PLATELET # BLD AUTO: 76 10E3/UL (ref 150–450)
RBC # BLD AUTO: 4.12 10E6/UL (ref 4.4–5.9)
WBC # BLD AUTO: 4.4 10E3/UL (ref 4–11)

## 2024-03-14 PROCEDURE — 80061 LIPID PANEL: CPT

## 2024-03-14 PROCEDURE — 85027 COMPLETE CBC AUTOMATED: CPT

## 2024-03-14 PROCEDURE — 36415 COLL VENOUS BLD VENIPUNCTURE: CPT

## 2024-03-14 PROCEDURE — 80069 RENAL FUNCTION PANEL: CPT

## 2024-03-15 LAB
ALBUMIN SERPL BCG-MCNC: 4.6 G/DL (ref 3.5–5.2)
ANION GAP SERPL CALCULATED.3IONS-SCNC: 13 MMOL/L (ref 7–15)
BUN SERPL-MCNC: 9.2 MG/DL (ref 8–23)
CALCIUM SERPL-MCNC: 9.9 MG/DL (ref 8.8–10.2)
CHLORIDE SERPL-SCNC: 99 MMOL/L (ref 98–107)
CHOLEST SERPL-MCNC: 160 MG/DL
CREAT SERPL-MCNC: 0.85 MG/DL (ref 0.67–1.17)
DEPRECATED HCO3 PLAS-SCNC: 27 MMOL/L (ref 22–29)
EGFRCR SERPLBLD CKD-EPI 2021: >90 ML/MIN/1.73M2
FASTING STATUS PATIENT QL REPORTED: YES
GLUCOSE SERPL-MCNC: 107 MG/DL (ref 70–99)
HDLC SERPL-MCNC: 55 MG/DL
LDLC SERPL CALC-MCNC: 89 MG/DL
NONHDLC SERPL-MCNC: 105 MG/DL
PHOSPHATE SERPL-MCNC: 3.2 MG/DL (ref 2.5–4.5)
POTASSIUM SERPL-SCNC: 4 MMOL/L (ref 3.4–5.3)
SODIUM SERPL-SCNC: 139 MMOL/L (ref 135–145)
TRIGL SERPL-MCNC: 81 MG/DL

## 2024-03-18 ENCOUNTER — OFFICE VISIT (OUTPATIENT)
Dept: FAMILY MEDICINE | Facility: CLINIC | Age: 64
End: 2024-03-18
Payer: COMMERCIAL

## 2024-03-18 ENCOUNTER — PATIENT OUTREACH (OUTPATIENT)
Dept: ONCOLOGY | Facility: CLINIC | Age: 64
End: 2024-03-18

## 2024-03-18 VITALS
OXYGEN SATURATION: 98 % | RESPIRATION RATE: 16 BRPM | HEART RATE: 83 BPM | TEMPERATURE: 97.6 F | WEIGHT: 189.2 LBS | HEIGHT: 63 IN | SYSTOLIC BLOOD PRESSURE: 115 MMHG | DIASTOLIC BLOOD PRESSURE: 71 MMHG | BODY MASS INDEX: 33.52 KG/M2

## 2024-03-18 DIAGNOSIS — I10 ESSENTIAL HYPERTENSION WITH GOAL BLOOD PRESSURE LESS THAN 140/90: ICD-10-CM

## 2024-03-18 DIAGNOSIS — M54.50 CHRONIC LOW BACK PAIN, UNSPECIFIED BACK PAIN LATERALITY, UNSPECIFIED WHETHER SCIATICA PRESENT: ICD-10-CM

## 2024-03-18 DIAGNOSIS — G89.29 CHRONIC LOW BACK PAIN, UNSPECIFIED BACK PAIN LATERALITY, UNSPECIFIED WHETHER SCIATICA PRESENT: ICD-10-CM

## 2024-03-18 DIAGNOSIS — D69.6 THROMBOCYTOPENIA (H): ICD-10-CM

## 2024-03-18 DIAGNOSIS — Z00.00 ROUTINE HISTORY AND PHYSICAL EXAMINATION OF ADULT: Primary | ICD-10-CM

## 2024-03-18 DIAGNOSIS — E78.5 HYPERLIPIDEMIA LDL GOAL <130: ICD-10-CM

## 2024-03-18 PROCEDURE — 99213 OFFICE O/P EST LOW 20 MIN: CPT | Mod: 25 | Performed by: FAMILY MEDICINE

## 2024-03-18 PROCEDURE — 99396 PREV VISIT EST AGE 40-64: CPT | Performed by: FAMILY MEDICINE

## 2024-03-18 RX ORDER — AMLODIPINE BESYLATE 10 MG/1
TABLET ORAL
Qty: 90 TABLET | Refills: 4 | Status: SHIPPED | OUTPATIENT
Start: 2024-03-18

## 2024-03-18 RX ORDER — TIZANIDINE 2 MG/1
2 TABLET ORAL 3 TIMES DAILY PRN
Qty: 40 TABLET | Refills: 3 | Status: SHIPPED | OUTPATIENT
Start: 2024-03-18

## 2024-03-18 ASSESSMENT — PAIN SCALES - GENERAL: PAINLEVEL: NO PAIN (0)

## 2024-03-18 ASSESSMENT — ASTHMA QUESTIONNAIRES
ACT_TOTALSCORE: 19
QUESTION_4 LAST FOUR WEEKS HOW OFTEN HAVE YOU USED YOUR RESCUE INHALER OR NEBULIZER MEDICATION (SUCH AS ALBUTEROL): ONCE A WEEK OR LESS
QUESTION_3 LAST FOUR WEEKS HOW OFTEN DID YOUR ASTHMA SYMPTOMS (WHEEZING, COUGHING, SHORTNESS OF BREATH, CHEST TIGHTNESS OR PAIN) WAKE YOU UP AT NIGHT OR EARLIER THAN USUAL IN THE MORNING: NOT AT ALL
QUESTION_5 LAST FOUR WEEKS HOW WOULD YOU RATE YOUR ASTHMA CONTROL: WELL CONTROLLED
ACT_TOTALSCORE: 19
QUESTION_1 LAST FOUR WEEKS HOW MUCH OF THE TIME DID YOUR ASTHMA KEEP YOU FROM GETTING AS MUCH DONE AT WORK, SCHOOL OR AT HOME: NONE OF THE TIME
QUESTION_2 LAST FOUR WEEKS HOW OFTEN HAVE YOU HAD SHORTNESS OF BREATH: MORE THAN ONCE A DAY

## 2024-03-18 NOTE — PROGRESS NOTES
Preventive Care Visit  [facility]  Martin Howe MD, Family Medicine  Mar 18, 2024        ASSESSMENT / PLAN:  (Z00.00) Routine history and physical examination of adult  (primary encounter diagnosis)  Comment: generally healthy and normal exam/labs  Plan: Reviewed self mole/testicle check handout.  vitmainD. exercise    (D69.6) Thrombocytopenia (H24)  Comment: worse but no symptoms   Plan: Adult Oncology/Hematology  Referral        Patient would like second opinion with hematology. Avoid asa/nsaids and ALCOHOL. Expected course and warning signs reviewed.     (M54.50,  G89.29) Chronic low back pain, unspecified back pain laterality, unspecified whether sciatica present  Comment: needs help  Plan: tiZANidine (ZANAFLEX) 2 MG tablet        Tylenol and add prn zanaflex. Stretching/heat    (E78.5) Hyperlipidemia LDL goal <130  Comment: stable  Plan: continue zocor    (I10) Essential hypertension with goal blood pressure less than 140/90  Comment: stable  Plan: amLODIPine (NORVASC) 10 MG tablet        Continue meds/self-mointor. Chest pain or shortness of breath henry Guerrero is a 64 year old, presenting for the following:  Physical  History htn, gerd/barrets, high cholesterol, hyperglycemia, fatty liver, obesity, low platelets and asthma    Rare nsaid. No asa. Occasionally ALCOHOL.   No black/bloody stools. Some blood noses in past. No hematuria.  No bruising.    Chronic low back pain.  Dating 23 years.   Daughter 39yo and 6 kids and one grandkid on way.   Emotionally doing ok. No chest pain or shortness of breath. No nausea, vomiting or diarrhea or abdominal pain pain. No urine changes or hematuria.   Cpap for [place] - helpful.   No mole changes or rasges, asthma stable.   No MDI needed. Outside blood pressure readings.        3/18/2024     2:09 PM   Additional Questions   Roomed by JOSEPHINE Isaac CMA        Health Care Directive  Patient does not have a Health Care Directive or  Living Will: Discussed advance care planning with patient; information given to patient to review.        3/12/2024   General Health   How would you rate your overall physical health? Good   Feel stress (tense, anxious, or unable to sleep) Only a little   (!) STRESS CONCERN      3/12/2024   Nutrition   Three or more servings of calcium each day? (!) NO   Diet: Low fat/cholesterol   How many servings of fruit and vegetables per day? (!) 0-1   How many sweetened beverages each day? (!) 3         3/12/2024   Exercise   Days per week of moderate/strenous exercise 3 days   Average minutes spent exercising at this level 30 min         3/12/2024   Social Factors   Frequency of gathering with friends or relatives Three times a week   Worry food won't last until get money to buy more No   Food not last or not have enough money for food? No   Do you have housing?  Yes   Are you worried about losing your housing? No   Lack of transportation? No   Unable to get utilities (heat,electricity)? No         3/12/2024   Fall Risk   Fallen 2 or more times in the past year? No   Trouble with walking or balance? No          3/12/2024   Dental   Dentist two times every year? Yes         3/12/2024   TB Screening   Were you born outside of the [place]? (!) YES           Today's PHQ-2 Score:       3/18/2024     1:34 PM   PHQ-2 ( 1999 [employer])   Q1: Little interest or pleasure in doing things 0   Q2: Feeling down, depressed or hopeless 0   PHQ-2 Score 0   Q1: Little interest or pleasure in doing things Not at all   Q2: Feeling down, depressed or hopeless Not at all   PHQ-2 Score 0           3/12/2024   Substance Use   Alcohol more than 3/day or more than 7/wk Not Applicable   Do you use any other substances recreationally? No     Social History     Tobacco Use    Smoking status: Never    Smokeless tobacco: Never    Tobacco comments:     non smoking household   Vaping Use    Vaping Use: Never used   Substance Use Topics    Alcohol use: Yes      "Comment: 2-3/night    Drug use: No             3/12/2024   One time HIV Screening   Previous HIV test? No         3/12/2024   STI Screening   New sexual partner(s) since last STI/HIV test? No   Last PSA:   PSA   Date Value Ref Range Status   02/10/2021 0.86 0 - 4 ug/L Final     Comment:     Assay Method:  Chemiluminescence using Siemens Vista analyzer     Prostate Specific Antigen Screen   Date Value Ref Range Status   05/08/2023 0.92 0.00 - 4.00 ug/L Final     ASCVD Risk   The 10-year ASCVD risk score (Don MCMILLAN, et al., 2019) is: 7.7%    Values used to calculate the score:      Age: 64 years      Sex: Male      Is Non- : No      Diabetic: No      Tobacco smoker: No      Systolic Blood Pressure: 115 mmHg      Is BP treated: Yes      HDL Cholesterol: 55 mg/dL      Total Cholesterol: 160 mg/dL           Reviewed and updated as needed this visit by Provider                             Objective    Exam  /71   Pulse 83   Temp 97.6  F (36.4  C) (Oral)   Resp 16   Ht 1.607 m (5' 3.25\")   Wt 85.8 kg (189 lb 3.2 oz)   SpO2 98%   BMI 33.25 kg/m     Estimated body mass index is 33.25 kg/m  as calculated from the following:    Height as of this encounter: 1.607 m (5' 3.25\").    Weight as of this encounter: 85.8 kg (189 lb 3.2 oz).    Physical Exam  GENERAL: alert and no distress  EYES: Eyes grossly normal to inspection, PERRL and conjunctivae and sclerae normal  HENT: ear canals and TM's normal, nose and mouth without ulcers or lesions  NECK: no adenopathy, no asymmetry, masses, or scars  RESP: lungs clear to auscultation - no rales, rhonchi or wheezes  BREAST: normal without masses, tenderness or nipple discharge and no palpable axillary masses or adenopathy  CV: regular rate and rhythm, normal S1 S2, no S3 or S4, no murmur, click or rub, no peripheral edema   ABDOMEN: soft, nontender, no hepatosplenomegaly, no masses and bowel sounds normal   (male): patient deferred /rectal " exams  MS: no gross musculoskeletal defects noted, no edema  SKIN: no suspicious lesions or rashes  NEURO: Normal strength and tone, mentation intact and speech normal  PSYCH: mentation appears normal, affect normal/bright  LYMPH: no cervical, supraclavicular, axillary, adenopathy      Signed Electronically by: Martin Howe MD

## 2024-03-18 NOTE — PROGRESS NOTES
Hematology referral reviewed for [facility], see below.    Referral reason: chronically low platelets, currently 76K dated 3/14/24, down from 100K dated 9/19/23    Clinical question entered by referring provider or through order transcription: low platelets    Referral received via: Internal referral by [facility]    Current abnormal labs: Available in Chart Review    Outreach: Paymentushart sent to patient    Plan: Triage instructions updated and sent to NPS for completion.

## 2024-04-10 ENCOUNTER — MYC MEDICAL ADVICE (OUTPATIENT)
Dept: FAMILY MEDICINE | Facility: CLINIC | Age: 64
End: 2024-04-10
Payer: COMMERCIAL

## 2024-04-10 NOTE — TELEPHONE ENCOUNTER
RECORDS STATUS - ALL OTHER DIAGNOSIS      RECORDS RECEIVED FROM: [facility] - Internal records   DATE RECEIVED: 4/10

## 2024-04-17 ENCOUNTER — PRE VISIT (OUTPATIENT)
Dept: ONCOLOGY | Facility: CLINIC | Age: 64
End: 2024-04-17
Payer: COMMERCIAL

## 2024-04-17 ENCOUNTER — LAB (OUTPATIENT)
Dept: INFUSION THERAPY | Facility: HOSPITAL | Age: 64
End: 2024-04-17
Attending: INTERNAL MEDICINE
Payer: COMMERCIAL

## 2024-04-17 ENCOUNTER — ONCOLOGY VISIT (OUTPATIENT)
Dept: ONCOLOGY | Facility: HOSPITAL | Age: 64
End: 2024-04-17
Attending: FAMILY MEDICINE
Payer: COMMERCIAL

## 2024-04-17 VITALS
HEART RATE: 101 BPM | HEIGHT: 63 IN | BODY MASS INDEX: 32.87 KG/M2 | DIASTOLIC BLOOD PRESSURE: 75 MMHG | OXYGEN SATURATION: 98 % | RESPIRATION RATE: 20 BRPM | TEMPERATURE: 98.5 F | SYSTOLIC BLOOD PRESSURE: 120 MMHG | WEIGHT: 185.5 LBS

## 2024-04-17 DIAGNOSIS — D69.6 THROMBOCYTOPENIA (H): ICD-10-CM

## 2024-04-17 DIAGNOSIS — D69.6 THROMBOCYTOPENIA (H): Primary | ICD-10-CM

## 2024-04-17 LAB
ALBUMIN SERPL BCG-MCNC: 4.5 G/DL (ref 3.5–5.2)
ALP SERPL-CCNC: 62 U/L (ref 40–150)
ALT SERPL W P-5'-P-CCNC: 38 U/L (ref 0–70)
ANION GAP SERPL CALCULATED.3IONS-SCNC: 11 MMOL/L (ref 7–15)
AST SERPL W P-5'-P-CCNC: 51 U/L (ref 0–45)
BASOPHILS # BLD AUTO: 0 10E3/UL (ref 0–0.2)
BASOPHILS NFR BLD AUTO: 1 %
BILIRUB SERPL-MCNC: 0.4 MG/DL
BUN SERPL-MCNC: 6.6 MG/DL (ref 8–23)
CALCIUM SERPL-MCNC: 9.8 MG/DL (ref 8.8–10.2)
CHLORIDE SERPL-SCNC: 96 MMOL/L (ref 98–107)
CREAT SERPL-MCNC: 0.86 MG/DL (ref 0.67–1.17)
DEPRECATED HCO3 PLAS-SCNC: 27 MMOL/L (ref 22–29)
EGFRCR SERPLBLD CKD-EPI 2021: >90 ML/MIN/1.73M2
EOSINOPHIL # BLD AUTO: 0 10E3/UL (ref 0–0.7)
EOSINOPHIL NFR BLD AUTO: 1 %
ERYTHROCYTE [DISTWIDTH] IN BLOOD BY AUTOMATED COUNT: 12.1 % (ref 10–15)
GLUCOSE SERPL-MCNC: 130 MG/DL (ref 70–99)
HCT VFR BLD AUTO: 39.9 % (ref 40–53)
HGB BLD-MCNC: 14.5 G/DL (ref 13.3–17.7)
IMM GRANULOCYTES # BLD: 0 10E3/UL
IMM GRANULOCYTES NFR BLD: 0 %
LYMPHOCYTES # BLD AUTO: 1.7 10E3/UL (ref 0.8–5.3)
LYMPHOCYTES NFR BLD AUTO: 26 %
MCH RBC QN AUTO: 33.1 PG (ref 26.5–33)
MCHC RBC AUTO-ENTMCNC: 36.3 G/DL (ref 31.5–36.5)
MCV RBC AUTO: 91 FL (ref 78–100)
MONOCYTES # BLD AUTO: 0.6 10E3/UL (ref 0–1.3)
MONOCYTES NFR BLD AUTO: 9 %
NEUTROPHILS # BLD AUTO: 4.3 10E3/UL (ref 1.6–8.3)
NEUTROPHILS NFR BLD AUTO: 64 %
NRBC # BLD AUTO: 0 10E3/UL
NRBC BLD AUTO-RTO: 0 /100
PATH REPORT.COMMENTS IMP SPEC: NORMAL
PATH REPORT.COMMENTS IMP SPEC: NORMAL
PATH REPORT.FINAL DX SPEC: NORMAL
PATH REPORT.RELEVANT HX SPEC: NORMAL
PLATELET # BLD AUTO: 103 10E3/UL (ref 150–450)
POTASSIUM SERPL-SCNC: 3.6 MMOL/L (ref 3.4–5.3)
PROT SERPL-MCNC: 7.2 G/DL (ref 6.4–8.3)
RBC # BLD AUTO: 4.38 10E6/UL (ref 4.4–5.9)
RETICS # AUTO: 0.08 10E6/UL (ref 0.03–0.1)
RETICS/RBC NFR AUTO: 1.9 % (ref 0.5–2)
SODIUM SERPL-SCNC: 134 MMOL/L (ref 135–145)
WBC # BLD AUTO: 6.7 10E3/UL (ref 4–11)

## 2024-04-17 PROCEDURE — 99204 OFFICE O/P NEW MOD 45 MIN: CPT | Performed by: INTERNAL MEDICINE

## 2024-04-17 PROCEDURE — 99213 OFFICE O/P EST LOW 20 MIN: CPT | Performed by: INTERNAL MEDICINE

## 2024-04-17 PROCEDURE — 85045 AUTOMATED RETICULOCYTE COUNT: CPT

## 2024-04-17 PROCEDURE — 36415 COLL VENOUS BLD VENIPUNCTURE: CPT

## 2024-04-17 PROCEDURE — 99207 BLOOD MORPHOLOGY PATHOLOGIST REVIEW: CPT | Performed by: PATHOLOGY

## 2024-04-17 PROCEDURE — 80053 COMPREHEN METABOLIC PANEL: CPT

## 2024-04-17 PROCEDURE — 85004 AUTOMATED DIFF WBC COUNT: CPT

## 2024-04-17 RX ORDER — ACETAMINOPHEN 500 MG
500-1000 TABLET ORAL PRN
COMMUNITY

## 2024-04-17 ASSESSMENT — ENCOUNTER SYMPTOMS
RESPIRATORY NEGATIVE: 1
HEMATOLOGIC/LYMPHATIC NEGATIVE: 1
ENDOCRINE NEGATIVE: 1
NEUROLOGICAL NEGATIVE: 1
SLEEP DISTURBANCE: 1
ARTHRALGIAS: 1
BACK PAIN: 1
GASTROINTESTINAL NEGATIVE: 1
EYES NEGATIVE: 1
CARDIOVASCULAR NEGATIVE: 1

## 2024-04-17 ASSESSMENT — PAIN SCALES - GENERAL: PAINLEVEL: MODERATE PAIN (5)

## 2024-04-17 NOTE — PROGRESS NOTES
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  The patient was in generally stable health until last year when he developed an unprovoked back infection that required drainage and twelve weeks of parenteral antibiotics.      He does have some baseline health issues including hypertension, hyperlipidemia, and obesity.  He has a gastric band to help with the obesity and over time has lost about 60#.      His platelet count is virtually the same now as the value recorded 10 years ago.  He's never had any unusual bleeding.    He does acknowledge consuming \"a couple of cocktails\" nightly.    He has a sister who has had thrombocytopenia.  Mother had heart problems, father had prostate cancer.    He shoots turkeys recreationally but still can't be as active as in the past due to ongoing back and left hip pain.  He also has some left shoulder problems.    ECOG = 0    Patient Active Problem List   Diagnosis    BURSITIS, PREPATELLAR - right    Migraine, ophthalmoplegic    Mild persistent asthma    Shoulder impingement - left    OREN (obstructive sleep apnea)    Allergy to mold spores    House dust mite allergy    Allergic rhinitis due to animal dander    Seasonal allergic conjunctivitis    Seasonal allergic rhinitis    Gastroesophageal reflux disease without esophagitis    Fatty liver    Essential hypertension with goal blood pressure less than 140/90    Other tear of medial meniscus of left knee as current injury, subsequent encounter    Advanced directives, counseling/discussion    Hyperlipidemia LDL goal <130    Chondromalacia, knee, left    Thrombocytopenia (H24)    " Chondromalacia, knee, right    Morbid obesity (H)    Status post bariatric surgery    Haney esophagus     Current Outpatient Medications   Medication Sig Dispense Refill    albuterol (PROAIR HFA) 108 (90 Base) MCG/ACT inhaler INHALE 2 PUFFS INTO THE LUNGS EVERY 6 HOURS AS NEEDED FOR SHORTNESS OF BREATH / DYSPNEA OR WHEEZING 18 g 2    amLODIPine (NORVASC) 10 MG tablet TAKE 1 TABLET BY MOUTH FOR BLOOD PRESSURE IN THE PM Strength: 10 mg 90 tablet 4    bisacodyl (DULCOLAX) 5 MG EC tablet Take 2 tablets at 3 pm the day before your procedure. If your procedure is before 11 am, take 2 additional tablets at 11 pm. If your procedure is after 11 am, take 2 additional tablets at 6 am. For additional instructions refer to your colonoscopy prep instructions. 4 tablet 0    budesonide (PULMICORT FLEXHALER) 180 MCG/ACT inhaler Inhale 1 puff into the lungs 2 times daily Replaces flovent for insurance 3 each 1    buPROPion (WELLBUTRIN SR) 150 MG 12 hr tablet TAKE 1 TABLET BY MOUTH EVERY MORNING FOR 7 DAYS, THEN 1 TABLET TWICE A DAY FOR WEIGHT LOSS/ENERGY 180 tablet 1    cyclobenzaprine (FLEXERIL) 10 MG tablet TAKE 1 TABLET (10 MG) BY MOUTH 2 TIMES DAILY AS NEEDED FOR MUSCLE SPASMS (Patient not taking: Reported on 9/19/2023) 30 tablet 1    lisinopril-hydrochlorothiazide (ZESTORETIC) 20-25 MG tablet TAKE 1 TABLET BY MOUTH DAILY THIS IS DOUBLE DOSAGE FOR BLOOD PRESSURE IN AM 90 tablet 0    omeprazole (PRILOSEC) 20 MG DR capsule Take 1 capsule (20 mg) by mouth daily As needed for heartburn/upset stomach 90 capsule 1    omeprazole (PRILOSEC) 20 MG DR capsule Take 1 capsule (20 mg) by mouth daily (Patient not taking: Reported on 3/18/2024) 90 capsule 3    order for DME CPAP supplies:  Mask, hose, tank, filters 1 each 0    polyethylene glycol (GOLYTELY) 236 g suspension The night before the exam at 6 pm drink an 8-ounce glass every 15 minutes until the jug is half empty. If you arrive before 11 AM: Drink the other half of the Golytely jug  at 11 PM night before procedure. If you arrive after 11 AM: Drink the other half of the [employer] jug at 6 AM day of procedure. For additional instructions refer to your colonoscopy prep instructions. 4000 mL 0    simvastatin (ZOCOR) 10 MG tablet TAKE 1 TABLET (10 MG) BY MOUTH AT BEDTIME FOR CHOLESTEROL 90 tablet 3    tiZANidine (ZANAFLEX) 2 MG tablet Take 1 tablet (2 mg) by mouth 3 times daily as needed for muscle spasms 40 tablet 3     No current facility-administered medications for this visit.     Past Medical History:   Diagnosis Date    Allergic rhinitis due to animal dander     Allergy to mold spores     1/27/15 skin tests pos. for:  cat/dog/DM/M/T/G/W     Arthritis     Chondromalacia, knee, left     Chondromalacia, knee, right     Diagnostic skin and sensitization tests (aka ALLERGENS) 01/27/2015    1/27/15 skin tests pos. for:  cat/dog/DM/M/T/G/W    High cholesterol     House dust mite allergy     Hypertension goal BP (blood pressure) < 140/90     LBP (low back pain)     Migraines     Obesity     OREN (obstructive sleep apnea)     PONV (postoperative nausea and vomiting)     S/P arthroscopy of left knee 08/01/2017    Seasonal allergic conjunctivitis     Seasonal allergic rhinitis     Seizure disorder (H)     Thrombocytopenia (H24) 07/11/2017    Uncomplicated asthma     Unspecified asthma(493.90)      Past Surgical History:   Procedure Laterality Date    ABDOMEN SURGERY      LAP BAND    ARTHROSCOPY KNEE Left 07/21/2017    Procedure: ARTHROSCOPY KNEE;  Left knee arthroscopy, meniscectomy, debridement;  Surgeon: Rishabh Logan MD;  Location:  OR    ARTHROSCOPY KNEE Right 09/01/2017    Procedure: ARTHROSCOPY KNEE;  Right knee arthroscopy, Chrondroplasty;  Surgeon: Rishabh Logan MD;  Location:  OR    BARIATRIC SURGERY  02/23/2009    LAPAROSCOPIC GASTRIC BANDING 02/23/2009    COLONOSCOPY  04/01/2013    Procedure: COLONOSCOPY;  COLONOSCOPY SCREEN;  Surgeon: Jameel Patel MD;  Location:  [facility]    COLONOSCOPY N/A 07/17/2023    Procedure: COLONOSCOPY;  Surgeon: Shay Toussaint DO;  Location: [facility]    COMBINED ESOPHAGOSCOPY, GASTROSCOPY, DUODENOSCOPY (EGD) WITH CO2 INSUFFLATION N/A 03/17/2021    Procedure: ESOPHAGOGASTRODUODENOSCOPY, WITH CO2 INSUFFLATION;  Surgeon: Kenneth Sellers MD;  Location: [facility]    ESOPHAGOSCOPY, GASTROSCOPY, DUODENOSCOPY (EGD), COMBINED N/A 08/21/2014    Procedure: COMBINED ESOPHAGOSCOPY, GASTROSCOPY, DUODENOSCOPY (EGD), BIOPSY SINGLE OR MULTIPLE;  Surgeon: Jameel Patel MD;  Location: [facility]    ESOPHAGOSCOPY, GASTROSCOPY, DUODENOSCOPY (EGD), COMBINED N/A 03/17/2021    Procedure: Esophagogastroduodenoscopy, With Biopsy;  Surgeon: Kenneth Sellers MD;  Location: [facility]    ESOPHAGOSCOPY, GASTROSCOPY, DUODENOSCOPY (EGD), COMBINED N/A 07/17/2023    Procedure: Esophagoscopy, gastroscopy, duodenoscopy, combined;  Surgeon: Shay Toussaint DO;  [facility]    LAPAROSCOPIC CHOLECYSTECTOMY N/A 09/10/2014    Procedure: LAPAROSCOPIC CHOLECYSTECTOMY;  Surgeon: Jameel Patel MD;  Location: [facility] OR    LASIK  01/01/2001    ORTHOPEDIC SURGERY  08/20/2004    Left shoulder/clavicle Jose Logan    TONSILLECTOMY  1968     Socioeconomic History    Marital status: Single   Occupational History    Occupation: -      Social Determinants of Health     Social Connections: Unknown (3/12/2024)    Social Connection and Isolation Panel [NHANES]     Frequency of Social Gatherings with Friends and Family: Three times a week   Interpersonal Safety: Low Risk  (3/18/2024)    Interpersonal Safety     Do you feel physically and emotionally safe where you currently live?: Yes     Within the past 12 months, have you been hit, slapped, kicked or otherwise physically hurt by someone?: No     Within the past 12 months, have you been humiliated or emotionally abused in other ways by your partner or ex-partner?: No     Review of  "Systems   HENT:  Negative.     Eyes: Negative.    Respiratory: Negative.     Cardiovascular: Negative.    Gastrointestinal: Negative.    Endocrine: Negative.    Genitourinary: Negative.     Musculoskeletal:  Positive for arthralgias and back pain.   Neurological: Negative.    Hematological: Negative.    Psychiatric/Behavioral:  Positive for sleep disturbance (uses CPAP).    All other systems reviewed and are negative.      /75 (BP Location: Left arm, Patient Position: Sitting, Cuff Size: Adult Regular)   Pulse 101   Temp 98.5  F (36.9  C) (Tympanic)   Resp 20   Ht 1.607 m (5' 3.25\")   Wt 84.1 kg (185 lb 8 oz)   SpO2 98%   BMI 32.60 kg/m      Physical Exam  Vitals and nursing note reviewed.   Constitutional:       Appearance: He is well-developed and overweight.      Comments: Calm and pleasant white male   HENT:      Head: Normocephalic and atraumatic.      Mouth/Throat:      Mouth: Mucous membranes are moist.      Dentition: Normal dentition. No dental caries.   Eyes:      Extraocular Movements: Extraocular movements intact.      Conjunctiva/sclera: Conjunctivae normal.      Pupils: Pupils are equal, round, and reactive to light.   Cardiovascular:      Rate and Rhythm: Normal rate and regular rhythm.      Heart sounds: Murmur heard.   Pulmonary:      Effort: Pulmonary effort is normal.      Breath sounds: Normal breath sounds.   Abdominal:      General: There is no distension.      Tenderness: There is no abdominal tenderness. There is no guarding.          Comments: Gastric band port site   Musculoskeletal:         General: No swelling or deformity.      Cervical back: Normal range of motion.   Lymphadenopathy:      Cervical: No cervical adenopathy.      Upper Body:      Right upper body: No axillary or epitrochlear adenopathy.      Left upper body: No axillary or epitrochlear adenopathy.   Skin:     General: Skin is warm and dry.   Neurological:      General: No focal deficit present.      Mental " Status: He is alert and oriented to person, place, and time.      Cranial Nerves: No cranial nerve deficit.      Motor: No weakness.      Gait: Gait normal.      Deep Tendon Reflexes: Reflexes normal.   Psychiatric:         Mood and Affect: Mood normal.         Behavior: Behavior normal. Behavior is cooperative.         Thought Content: Thought content normal.         Judgment: Judgment normal.         Recent Results (from the past 720 hour(s))   Comprehensive metabolic panel    Collection Time: 04/17/24  9:58 AM   Result Value Ref Range    Sodium 134 (L) 135 - 145 mmol/L    Potassium 3.6 3.4 - 5.3 mmol/L    Carbon Dioxide (CO2) 27 22 - 29 mmol/L    Anion Gap 11 7 - 15 mmol/L    Urea Nitrogen 6.6 (L) 8.0 - 23.0 mg/dL    Creatinine 0.86 0.67 - 1.17 mg/dL    GFR Estimate >90 >60 mL/min/1.73m2    Calcium 9.8 8.8 - 10.2 mg/dL    Chloride 96 (L) 98 - 107 mmol/L    Glucose 130 (H) 70 - 99 mg/dL    Alkaline Phosphatase 62 40 - 150 U/L    AST 51 (H) 0 - 45 U/L    ALT 38 0 - 70 U/L    Protein Total 7.2 6.4 - 8.3 g/dL    Albumin 4.5 3.5 - 5.2 g/dL    Bilirubin Total 0.4 <=1.2 mg/dL   Bld morphology pathology review    Collection Time: 04/17/24  9:58 AM   Result Value Ref Range    Final Diagnosis       Peripheral blood  - Mild normochromic normocytic anemia  - Thrombocytopenia      Comment       The clinical history has been reviewed.      Clinical Information       Gradually progressing thrombocytopenia       Performing Labs       The technical component of this testing was completed at [facility] and [facility]     CBC with platelets and differential    Collection Time: 04/17/24  9:58 AM   Result Value Ref Range    WBC Count 6.7 4.0 - 11.0 10e3/uL    RBC Count 4.38 (L) 4.40 - 5.90 10e6/uL    Hemoglobin 14.5 13.3 - 17.7 g/dL    Hematocrit 39.9 (L) 40.0 - 53.0 %    MCV 91 78 - 100 fL    MCH 33.1 (H) 26.5 - 33.0 pg    MCHC 36.3 31.5 - 36.5 g/dL    RDW 12.1 10.0 - 15.0 %     Platelet Count 103 (L) 150 - 450 10e3/uL    % Neutrophils 64 %    % Lymphocytes 26 %    % Monocytes 9 %    % Eosinophils 1 %    % Basophils 1 %    % Immature Granulocytes 0 %    NRBCs per 100 WBC 0 <1 /100    Absolute Neutrophils 4.3 1.6 - 8.3 10e3/uL    Absolute Lymphocytes 1.7 0.8 - 5.3 10e3/uL    Absolute Monocytes 0.6 0.0 - 1.3 10e3/uL    Absolute Eosinophils 0.0 0.0 - 0.7 10e3/uL    Absolute Basophils 0.0 0.0 - 0.2 10e3/uL    Absolute Immature Granulocytes 0.0 <=0.4 10e3/uL    Absolute NRBCs 0.0 10e3/uL   Reticulocyte count    Collection Time: 04/17/24  9:58 AM   Result Value Ref Range    % Reticulocyte 1.9 0.5 - 2.0 %    Absolute Reticulocyte 0.083 0.025 - 0.095 10e6/uL         No results found for this or any previous visit (from the past 744 hour(s)).

## 2024-04-17 NOTE — LETTER
"    4/17/2024         RE: Cesar Foss  [id number] [facility] [id number]        Dear Colleague,    Thank you for referring your patient, Cesar Foss, to the [facility]. Please see a copy of my visit note below.    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  The patient was in generally stable health until last year when he developed an unprovoked back infection that required drainage and twelve weeks of parenteral antibiotics.      He does have some baseline health issues including hypertension, hyperlipidemia, and obesity.  He has a gastric band to help with the obesity and over time has lost about 60#.      His platelet count is virtually the same now as the value recorded 10 years ago.  He's never had any unusual bleeding.    He does acknowledge consuming \"a couple of cocktails\" nightly.    He has a sister who has had thrombocytopenia.  Mother had heart problems, father had prostate cancer.    He shoots turkeys recreationally but still can't be as active as in the past due to ongoing back and left hip pain.  He also has some left shoulder problems.    ECOG = 0    Patient Active Problem List   Diagnosis     BURSITIS, PREPATELLAR - right     Migraine, ophthalmoplegic     Mild persistent asthma     Shoulder impingement - left     OREN (obstructive sleep apnea)     Allergy to mold spores     House dust mite allergy     Allergic rhinitis due to animal dander     Seasonal allergic conjunctivitis     Seasonal allergic rhinitis     Gastroesophageal reflux disease without esophagitis     Fatty liver     Essential hypertension " with goal blood pressure less than 140/90     Other tear of medial meniscus of left knee as current injury, subsequent encounter     Advanced directives, counseling/discussion     Hyperlipidemia LDL goal <130     Chondromalacia, knee, left     Thrombocytopenia (H24)     Chondromalacia, knee, right     Morbid obesity (H)     Status post bariatric surgery     Haney esophagus     Current Outpatient Medications   Medication Sig Dispense Refill     albuterol (PROAIR HFA) 108 (90 Base) MCG/ACT inhaler INHALE 2 PUFFS INTO THE LUNGS EVERY 6 HOURS AS NEEDED FOR SHORTNESS OF BREATH / DYSPNEA OR WHEEZING 18 g 2     amLODIPine (NORVASC) 10 MG tablet TAKE 1 TABLET BY MOUTH FOR BLOOD PRESSURE IN THE PM Strength: 10 mg 90 tablet 4     bisacodyl (DULCOLAX) 5 MG EC tablet Take 2 tablets at 3 pm the day before your procedure. If your procedure is before 11 am, take 2 additional tablets at 11 pm. If your procedure is after 11 am, take 2 additional tablets at 6 am. For additional instructions refer to your colonoscopy prep instructions. 4 tablet 0     budesonide (PULMICORT FLEXHALER) 180 MCG/ACT inhaler Inhale 1 puff into the lungs 2 times daily Replaces flovent for insurance 3 each 1     buPROPion (WELLBUTRIN SR) 150 MG 12 hr tablet TAKE 1 TABLET BY MOUTH EVERY MORNING FOR 7 DAYS, THEN 1 TABLET TWICE A DAY FOR WEIGHT LOSS/ENERGY 180 tablet 1     cyclobenzaprine (FLEXERIL) 10 MG tablet TAKE 1 TABLET (10 MG) BY MOUTH 2 TIMES DAILY AS NEEDED FOR MUSCLE SPASMS (Patient not taking: Reported on 9/19/2023) 30 tablet 1     lisinopril-hydrochlorothiazide (ZESTORETIC) 20-25 MG tablet TAKE 1 TABLET BY MOUTH DAILY THIS IS DOUBLE DOSAGE FOR BLOOD PRESSURE IN AM 90 tablet 0     omeprazole (PRILOSEC) 20 MG DR capsule Take 1 capsule (20 mg) by mouth daily As needed for heartburn/upset stomach 90 capsule 1     omeprazole (PRILOSEC) 20 MG DR capsule Take 1 capsule (20 mg) by mouth daily (Patient not taking: Reported on 3/18/2024) 90 capsule 3      order for DME CPAP supplies:  Mask, hose, tank, filters 1 each 0     polyethylene glycol (GOLYTELY) 236 g suspension The night before the exam at 6 pm drink an 8-ounce glass every 15 minutes until the jug is half empty. If you arrive before 11 AM: Drink the other half of the Golytely jug at 11 PM night before procedure. If you arrive after 11 AM: Drink the other half of the Golytely jug at 6 AM day of procedure. For additional instructions refer to your colonoscopy prep instructions. 4000 mL 0     simvastatin (ZOCOR) 10 MG tablet TAKE 1 TABLET (10 MG) BY MOUTH AT BEDTIME FOR CHOLESTEROL 90 tablet 3     tiZANidine (ZANAFLEX) 2 MG tablet Take 1 tablet (2 mg) by mouth 3 times daily as needed for muscle spasms 40 tablet 3     No current facility-administered medications for this visit.     Past Medical History:   Diagnosis Date     Allergic rhinitis due to animal dander      Allergy to mold spores     1/27/15 skin tests pos. for:  cat/dog/DM/M/T/G/W      Arthritis      Chondromalacia, knee, left      Chondromalacia, knee, right      Diagnostic skin and sensitization tests (aka ALLERGENS) 01/27/2015    1/27/15 skin tests pos. for:  cat/dog/DM/M/T/G/W     High cholesterol      House dust mite allergy      Hypertension goal BP (blood pressure) < 140/90      LBP (low back pain)      Migraines      Obesity      OREN (obstructive sleep apnea)      PONV (postoperative nausea and vomiting)      S/P arthroscopy of left knee 08/01/2017     Seasonal allergic conjunctivitis      Seasonal allergic rhinitis      Seizure disorder (H)      Thrombocytopenia (H24) 07/11/2017     Uncomplicated asthma      Unspecified asthma(493.90)      Past Surgical History:   Procedure Laterality Date     ABDOMEN SURGERY      LAP BAND     ARTHROSCOPY KNEE Left 07/21/2017    Procedure: ARTHROSCOPY KNEE;  Left knee arthroscopy, meniscectomy, debridement;  Surgeon: Rishabh Logan MD;  Location: [facility] OR     ARTHROSCOPY KNEE Right 09/01/2017     Procedure: ARTHROSCOPY KNEE;  Right knee arthroscopy, Chrondroplasty;  Surgeon: Rishabh Logan MD;  Location:  OR     BARIATRIC SURGERY  02/23/2009    LAPAROSCOPIC GASTRIC BANDING 02/23/2009     COLONOSCOPY  04/01/2013    Procedure: COLONOSCOPY;  COLONOSCOPY SCREEN;  Surgeon: Jameel Patel MD;  Location: [facility] OR     COLONOSCOPY N/A 07/17/2023    Procedure: COLONOSCOPY;  Surgeon: Shay Toussaint DO;  Location: [facility]     COMBINED ESOPHAGOSCOPY, GASTROSCOPY, DUODENOSCOPY (EGD) WITH CO2 INSUFFLATION N/A 03/17/2021    Procedure: ESOPHAGOGASTRODUODENOSCOPY, WITH CO2 INSUFFLATION;  Surgeon: Kenneth Sellers MD;  Location: [facility] OR     ESOPHAGOSCOPY, GASTROSCOPY, DUODENOSCOPY (EGD), COMBINED N/A 08/21/2014    Procedure: COMBINED ESOPHAGOSCOPY, GASTROSCOPY, DUODENOSCOPY (EGD), BIOPSY SINGLE OR MULTIPLE;  Surgeon: Jameel Patel MD;  Location: [facility] OR     ESOPHAGOSCOPY, GASTROSCOPY, DUODENOSCOPY (EGD), COMBINED N/A 03/17/2021    Procedure: Esophagogastroduodenoscopy, With Biopsy;  Surgeon: Kenneth Sellers MD;  Location: [facility] OR     ESOPHAGOSCOPY, GASTROSCOPY, DUODENOSCOPY (EGD), COMBINED N/A 07/17/2023    Procedure: Esophagoscopy, gastroscopy, duodenoscopy, combined;  Surgeon: Shay Toussaint DO;  Location: [facility]     LAPAROSCOPIC CHOLECYSTECTOMY N/A 09/10/2014    Procedure: LAPAROSCOPIC CHOLECYSTECTOMY;  Surgeon: Jameel Patel MD;  Location:  OR     LASIK  01/01/2001     ORTHOPEDIC SURGERY  08/20/2004    Left shoulder/clavicle [place]- Dr Logan     TONSILLECTOMY  1968     Socioeconomic History     Marital status: Single   Occupational History     Occupation: -      Social Determinants of Health     Social Connections: Unknown (3/12/2024)    Social Connection and Isolation Panel [NHANES]      Frequency of Social Gatherings with Friends and Family: Three times a week   Interpersonal Safety: Low Risk  (3/18/2024)    Interpersonal  "Safety      Do you feel physically and emotionally safe where you currently live?: Yes      Within the past 12 months, have you been hit, slapped, kicked or otherwise physically hurt by someone?: No      Within the past 12 months, have you been humiliated or emotionally abused in other ways by your partner or ex-partner?: No     Review of Systems   HENT:  Negative.     Eyes: Negative.    Respiratory: Negative.     Cardiovascular: Negative.    Gastrointestinal: Negative.    Endocrine: Negative.    Genitourinary: Negative.     Musculoskeletal:  Positive for arthralgias and back pain.   Neurological: Negative.    Hematological: Negative.    Psychiatric/Behavioral:  Positive for sleep disturbance (uses CPAP).    All other systems reviewed and are negative.      /75 (BP Location: Left arm, Patient Position: Sitting, Cuff Size: Adult Regular)   Pulse 101   Temp 98.5  F (36.9  C) (Tympanic)   Resp 20   Ht 1.607 m (5' 3.25\")   Wt 84.1 kg (185 lb 8 oz)   SpO2 98%   BMI 32.60 kg/m      Physical Exam  Vitals and nursing note reviewed.   Constitutional:       Appearance: He is well-developed and overweight.      Comments: Calm and pleasant white male   HENT:      Head: Normocephalic and atraumatic.      Mouth/Throat:      Mouth: Mucous membranes are moist.      Dentition: Normal dentition. No dental caries.   Eyes:      Extraocular Movements: Extraocular movements intact.      Conjunctiva/sclera: Conjunctivae normal.      Pupils: Pupils are equal, round, and reactive to light.   Cardiovascular:      Rate and Rhythm: Normal rate and regular rhythm.      Heart sounds: Murmur heard.   Pulmonary:      Effort: Pulmonary effort is normal.      Breath sounds: Normal breath sounds.   Abdominal:      General: There is no distension.      Tenderness: There is no abdominal tenderness. There is no guarding.          Comments: Gastric band port site   Musculoskeletal:         General: No swelling or deformity.      Cervical " back: Normal range of motion.   Lymphadenopathy:      Cervical: No cervical adenopathy.      Upper Body:      Right upper body: No axillary or epitrochlear adenopathy.      Left upper body: No axillary or epitrochlear adenopathy.   Skin:     General: Skin is warm and dry.   Neurological:      General: No focal deficit present.      Mental Status: He is alert and oriented to person, place, and time.      Cranial Nerves: No cranial nerve deficit.      Motor: No weakness.      Gait: Gait normal.      Deep Tendon Reflexes: Reflexes normal.   Psychiatric:         Mood and Affect: Mood normal.         Behavior: Behavior normal. Behavior is cooperative.         Thought Content: Thought content normal.         Judgment: Judgment normal.         Recent Results (from the past 720 hour(s))   Comprehensive metabolic panel    Collection Time: 04/17/24  9:58 AM   Result Value Ref Range    Sodium 134 (L) 135 - 145 mmol/L    Potassium 3.6 3.4 - 5.3 mmol/L    Carbon Dioxide (CO2) 27 22 - 29 mmol/L    Anion Gap 11 7 - 15 mmol/L    Urea Nitrogen 6.6 (L) 8.0 - 23.0 mg/dL    Creatinine 0.86 0.67 - 1.17 mg/dL    GFR Estimate >90 >60 mL/min/1.73m2    Calcium 9.8 8.8 - 10.2 mg/dL    Chloride 96 (L) 98 - 107 mmol/L    Glucose 130 (H) 70 - 99 mg/dL    Alkaline Phosphatase 62 40 - 150 U/L    AST 51 (H) 0 - 45 U/L    ALT 38 0 - 70 U/L    Protein Total 7.2 6.4 - 8.3 g/dL    Albumin 4.5 3.5 - 5.2 g/dL    Bilirubin Total 0.4 <=1.2 mg/dL   Bld morphology pathology review    Collection Time: 04/17/24  9:58 AM   Result Value Ref Range    Final Diagnosis       Peripheral blood  - Mild normochromic normocytic anemia  - Thrombocytopenia      Comment       The clinical history has been reviewed.      Clinical Information       Gradually progressing thrombocytopenia       Performing Labs       The technical component of this testing was completed at [facility]     CBC with platelets and  "differential    Collection Time: 04/17/24  9:58 AM   Result Value Ref Range    WBC Count 6.7 4.0 - 11.0 10e3/uL    RBC Count 4.38 (L) 4.40 - 5.90 10e6/uL    Hemoglobin 14.5 13.3 - 17.7 g/dL    Hematocrit 39.9 (L) 40.0 - 53.0 %    MCV 91 78 - 100 fL    MCH 33.1 (H) 26.5 - 33.0 pg    MCHC 36.3 31.5 - 36.5 g/dL    RDW 12.1 10.0 - 15.0 %    Platelet Count 103 (L) 150 - 450 10e3/uL    % Neutrophils 64 %    % Lymphocytes 26 %    % Monocytes 9 %    % Eosinophils 1 %    % Basophils 1 %    % Immature Granulocytes 0 %    NRBCs per 100 WBC 0 <1 /100    Absolute Neutrophils 4.3 1.6 - 8.3 10e3/uL    Absolute Lymphocytes 1.7 0.8 - 5.3 10e3/uL    Absolute Monocytes 0.6 0.0 - 1.3 10e3/uL    Absolute Eosinophils 0.0 0.0 - 0.7 10e3/uL    Absolute Basophils 0.0 0.0 - 0.2 10e3/uL    Absolute Immature Granulocytes 0.0 <=0.4 10e3/uL    Absolute NRBCs 0.0 10e3/uL   Reticulocyte count    Collection Time: 04/17/24  9:58 AM   Result Value Ref Range    % Reticulocyte 1.9 0.5 - 2.0 %    Absolute Reticulocyte 0.083 0.025 - 0.095 10e6/uL         No results found for this or any previous visit (from the past 744 hour(s)).      Oncology Rooming Note    April 17, 2024 10:11 AM   Cesar Foss is a 64 year old male who presents for:    Chief Complaint   Patient presents with     Hematology     Thrombocytopenia     Initial Vitals: /75 (BP Location: Left arm, Patient Position: Sitting, Cuff Size: Adult Regular)   Pulse 101   Temp 98.5  F (36.9  C) (Tympanic)   Resp 20   Ht 1.607 m (5' 3.25\")   Wt 84.1 kg (185 lb 8 oz)   SpO2 98%   BMI 32.60 kg/m   Estimated body mass index is 32.6 kg/m  as calculated from the following:    Height as of this encounter: 1.607 m (5' 3.25\").    Weight as of this encounter: 84.1 kg (185 lb 8 oz). Body surface area is 1.94 meters squared.  Moderate Pain (5) Comment: Data Unavailable   No LMP for male patient.  Allergies reviewed: Yes  Medications reviewed: Yes    Medications: Medication refills not needed " today.  Pharmacy name entered into [employer]: [id number] - [place], [place] - [address] [facility]    Frailty Screening:   Is the patient here for a new oncology consult visit in cancer care? 2. No      Clinical concerns: new patient consult.        Shiloh Del Rosario MA              Again, thank you for allowing me to participate in the care of your patient.        Sincerely,        Rosaura Valerio MD

## 2024-04-17 NOTE — PROGRESS NOTES
"Oncology Rooming Note    April 17, 2024 10:11 AM   Cesar Foss is a 64 year old male who presents for:    Chief Complaint   Patient presents with    Hematology     Thrombocytopenia     Initial Vitals: /75 (BP Location: Left arm, Patient Position: Sitting, Cuff Size: Adult Regular)   Pulse 101   Temp 98.5  F (36.9  C) (Tympanic)   Resp 20   Ht 1.607 m (5' 3.25\")   Wt 84.1 kg (185 lb 8 oz)   SpO2 98%   BMI 32.60 kg/m   Estimated body mass index is 32.6 kg/m  as calculated from the following:    Height as of this encounter: 1.607 m (5' 3.25\").    Weight as of this encounter: 84.1 kg (185 lb 8 oz). Body surface area is 1.94 meters squared.  Moderate Pain (5) Comment: Data Unavailable   No LMP for male patient.  Allergies reviewed: Yes  Medications reviewed: Yes    Medications: Medication refills not needed today.  Pharmacy name entered into [employer] ([employer]): [id number] - [facility] [address] [facility]    Frailty Screening:   Is the patient here for a new oncology consult visit in cancer care? 2. No      Clinical concerns: new patient consult.        Shiloh Del Rosario MA              "

## 2024-04-22 ENCOUNTER — OFFICE VISIT (OUTPATIENT)
Dept: ORTHOPEDICS | Facility: CLINIC | Age: 64
End: 2024-04-22
Payer: COMMERCIAL

## 2024-04-22 ENCOUNTER — ANCILLARY PROCEDURE (OUTPATIENT)
Dept: GENERAL RADIOLOGY | Facility: CLINIC | Age: 64
End: 2024-04-22
Attending: ORTHOPAEDIC SURGERY
Payer: COMMERCIAL

## 2024-04-22 VITALS
WEIGHT: 189 LBS | SYSTOLIC BLOOD PRESSURE: 120 MMHG | BODY MASS INDEX: 33.49 KG/M2 | RESPIRATION RATE: 18 BRPM | HEIGHT: 63 IN | HEART RATE: 90 BPM | DIASTOLIC BLOOD PRESSURE: 77 MMHG

## 2024-04-22 DIAGNOSIS — M67.912 ROTATOR CUFF DISORDER, LEFT: ICD-10-CM

## 2024-04-22 DIAGNOSIS — M25.512 LEFT SHOULDER PAIN, UNSPECIFIED CHRONICITY: Primary | ICD-10-CM

## 2024-04-22 DIAGNOSIS — M25.512 LEFT SHOULDER PAIN, UNSPECIFIED CHRONICITY: ICD-10-CM

## 2024-04-22 PROCEDURE — 99214 OFFICE O/P EST MOD 30 MIN: CPT | Performed by: ORTHOPAEDIC SURGERY

## 2024-04-22 PROCEDURE — 73030 X-RAY EXAM OF SHOULDER: CPT | Mod: TC | Performed by: RADIOLOGY

## 2024-04-22 NOTE — PATIENT INSTRUCTIONS
Please call [facility] at [phone] to schedule your MRI.     [facility] is located at:   [address] [place] [address]    Once your MRI is scheduled, make an appointment to discuss the results with Dr. Rishabh Logan.  You can call  [phone] to make this appointment after your MRI scan is performed. Dr. Logan prefers patients to come in for a follow-up appointment to discuss next steps after the MRI.

## 2024-04-22 NOTE — LETTER
4/22/2024         RE: Cesar Foss  [address] [facility] [id number]        Dear Colleague,    Thank you for referring your patient, Cesar Foss, to the [facility]. Please see a copy of my visit note below.    Cesar Foss is a 64 year old male who is seen as self referral for left shoulder pain.  He has had a long history of problems with this shoulder.  I had performed Neer Leta procedure on 8/2004.  He then had a series of falls and injuries in 2013 requiring strengthening and Voltaren.  No apparent damage was done at that time.  He has now developed increased pain in his left shoulder for the past month.  It is especially problematic if he brings his arm overhead.  He has a shooting pain in the shoulder when lifting overhead.  He has been using the tubing that he had from prior therapy episodes for rotator cuff strengthening.  He is right-hand dominant.  He is a manager in manufacturing.    X-ray shows no arthritis of the left shoulder.  There is good space in the coracoacromial arch.  Distal clavicle has been resected.    Past Medical History:   Diagnosis Date     Allergic rhinitis due to animal dander      Allergy to mold spores     1/27/15 skin tests pos. for:  cat/dog/DM/M/T/G/W      Arthritis      Chondromalacia, knee, left      Chondromalacia, knee, right      Diagnostic skin and sensitization tests (aka ALLERGENS) 01/27/2015    1/27/15 skin tests pos. for:  cat/dog/DM/M/T/G/W     High cholesterol      House dust mite allergy      Hypertension goal BP (blood pressure) < 140/90      LBP (low back pain)      Migraines      Obesity      OREN (obstructive sleep apnea)      PONV (postoperative nausea and vomiting)      S/P arthroscopy of left knee 08/01/2017     Seasonal allergic conjunctivitis      Seasonal allergic rhinitis      Seizure disorder (H)      Thrombocytopenia (H24) 07/11/2017     Uncomplicated asthma      Unspecified asthma(493.90)        Past  Surgical History:   Procedure Laterality Date     ABDOMEN SURGERY      LAP BAND     ARTHROSCOPY KNEE Left 07/21/2017    Procedure: ARTHROSCOPY KNEE;  Left knee arthroscopy, meniscectomy, debridement;  Surgeon: Rishabh Logan MD;  [facility]: [facility]     ARTHROSCOPY KNEE Right 09/01/2017    Procedure: ARTHROSCOPY KNEE;  Right knee arthroscopy, Chrondroplasty;  Surgeon: Rishabh Logan MD;  Location: [facility]     BARIATRIC SURGERY  02/23/2009    LAPAROSCOPIC GASTRIC BANDING 02/23/2009     COLONOSCOPY  04/01/2013    Procedure: COLONOSCOPY;  COLONOSCOPY SCREEN;  Surgeon: Jameel Patel MD;  Location: [facility]     COLONOSCOPY N/A 07/17/2023    Procedure: COLONOSCOPY;  Surgeon: Shay Toussaint DO;  Location: [facility]     COMBINED ESOPHAGOSCOPY, GASTROSCOPY, DUODENOSCOPY (EGD) WITH CO2 INSUFFLATION N/A 03/17/2021    Procedure: ESOPHAGOGASTRODUODENOSCOPY, WITH CO2 INSUFFLATION;  Surgeon: Kenneth Sellers MD;  [facility]     ESOPHAGOSCOPY, GASTROSCOPY, DUODENOSCOPY (EGD), COMBINED N/A 08/21/2014    Procedure: COMBINED ESOPHAGOSCOPY, GASTROSCOPY, DUODENOSCOPY (EGD), BIOPSY SINGLE OR MULTIPLE;  Surgeon: Jameel Patel MD;  [facility]     ESOPHAGOSCOPY, GASTROSCOPY, DUODENOSCOPY (EGD), COMBINED N/A 03/17/2021    Procedure: Esophagogastroduodenoscopy, With Biopsy;  Surgeon: Kenneth Sellers MD;  [facility]     ESOPHAGOSCOPY, GASTROSCOPY, DUODENOSCOPY (EGD), COMBINED N/A 07/17/2023    Procedure: Esophagoscopy, gastroscopy, duodenoscopy, combined;  Surgeon: Shay Toussaint DO;  Location: [facility]     LAPAROSCOPIC CHOLECYSTECTOMY N/A 09/10/2014    Procedure: LAPAROSCOPIC CHOLECYSTECTOMY;  Surgeon: Jameel Patel MD;  Location: [facility]     LASIK  01/01/2001     ORTHOPEDIC SURGERY  08/20/2004    Left shoulder/clavicle [facility]- Dr Logan     TONSILLECTOMY  1968       Family History   Problem Relation Age of Onset     Eye Disorder Mother      Heart Disease Mother         58yo  MI.     Lipids Mother      Macular Degeneration Mother      Diabetes Mother      Hypertension Mother      Lipids Father      Cancer Father      Hypertension Father      Alcohol/Drug Brother      Diabetes Sister         hyperglycemic     Obesity Daughter      Glaucoma No family hx of      Cerebrovascular Disease No family hx of      Thyroid Disease No family hx of        Social History     Socioeconomic History     Marital status: Single     Spouse name: Not on file     Number of children: Not on file     Years of education: Not on file     Highest education level: Not on file   Occupational History     Occupation: -    Tobacco Use     Smoking status: Never     Smokeless tobacco: Never     Tobacco comments:     non smoking household   Vaping Use     Vaping status: Never Used   Substance and Sexual Activity     Alcohol use: Yes     Comment: 2-3/night     Drug use: No     Sexual activity: Yes     Partners: Female     Birth control/protection: Condom   Other Topics Concern     Parent/sibling w/ CABG, MI or angioplasty before 65F 55M? Yes     Comment: MOM   Social History Narrative     Not on file     Social Determinants of Health     Financial Resource Strain: Low Risk  (3/12/2024)    Financial Resource Strain      Within the past 12 months, have you or your family members you live with been unable to get utilities (heat, electricity) when it was really needed?: No   Food Insecurity: Low Risk  (3/12/2024)    Food Insecurity      Within the past 12 months, did you worry that your food would run out before you got money to buy more?: No      Within the past 12 months, did the food you bought just not last and you didn t have money to get more?: No   Transportation Needs: Low Risk  (3/12/2024)    Transportation Needs      Within the past 12 months, has lack of transportation kept you from medical appointments, getting your medicines, non-medical meetings or appointments, work, or from  getting things that you need?: No   Physical Activity: Insufficiently Active (3/12/2024)    Exercise Vital Sign      Days of Exercise per Week: 3 days      Minutes of Exercise per Session: 30 min   Stress: No Stress Concern Present (3/12/2024)    [employer] [place] - Occupational Stress Questionnaire      Feeling of Stress : Only a little   Social Connections: Unknown (3/12/2024)    Social Connection and Isolation Panel [NHANES]      Frequency of Communication with Friends and Family: Not on file      Frequency of Social Gatherings with Friends and Family: Three times a week      Attends Sikhism Services: Not on file      Active Member of Clubs or Organizations: Not on file      Attends Club or Organization Meetings: Not on file      Marital Status: Not on file   Interpersonal Safety: Low Risk  (3/18/2024)    Interpersonal Safety      Do you feel physically and emotionally safe where you currently live?: Yes      Within the past 12 months, have you been hit, slapped, kicked or otherwise physically hurt by someone?: No      Within the past 12 months, have you been humiliated or emotionally abused in other ways by your partner or ex-partner?: No   Housing Stability: Low Risk  (3/12/2024)    Housing Stability      Do you have housing? : Yes      Are you worried about losing your housing?: No       Current Outpatient Medications   Medication Sig Dispense Refill     acetaminophen (TYLENOL) 500 MG tablet Take 500-1,000 mg by mouth as needed       albuterol (PROAIR HFA) 108 (90 Base) MCG/ACT inhaler INHALE 2 PUFFS INTO THE LUNGS EVERY 6 HOURS AS NEEDED FOR SHORTNESS OF BREATH / DYSPNEA OR WHEEZING 18 g 2     amLODIPine (NORVASC) 10 MG tablet TAKE 1 TABLET BY MOUTH FOR BLOOD PRESSURE IN THE PM Strength: 10 mg 90 tablet 4     bisacodyl (DULCOLAX) 5 MG EC tablet Take 2 tablets at 3 pm the day before your procedure. If your procedure is before 11 am, take 2 additional tablets at 11 pm. If your procedure  is after 11 am, take 2 additional tablets at 6 am. For additional instructions refer to your colonoscopy prep instructions. 4 tablet 0     budesonide (PULMICORT FLEXHALER) 180 MCG/ACT inhaler Inhale 1 puff into the lungs 2 times daily Replaces flovent for insurance 3 each 1     buPROPion (WELLBUTRIN SR) 150 MG 12 hr tablet TAKE 1 TABLET BY MOUTH EVERY MORNING FOR 7 DAYS, THEN 1 TABLET TWICE A DAY FOR WEIGHT LOSS/ENERGY 180 tablet 1     cyclobenzaprine (FLEXERIL) 10 MG tablet TAKE 1 TABLET (10 MG) BY MOUTH 2 TIMES DAILY AS NEEDED FOR MUSCLE SPASMS (Patient not taking: Reported on 9/19/2023) 30 tablet 1     lisinopril-hydrochlorothiazide (ZESTORETIC) 20-25 MG tablet TAKE 1 TABLET BY MOUTH DAILY THIS IS DOUBLE DOSAGE FOR BLOOD PRESSURE IN AM 90 tablet 0     omeprazole (PRILOSEC) 20 MG DR capsule Take 1 capsule (20 mg) by mouth daily As needed for heartburn/upset stomach 90 capsule 1     omeprazole (PRILOSEC) 20 MG DR capsule Take 1 capsule (20 mg) by mouth daily (Patient not taking: Reported on 3/18/2024) 90 capsule 3     order for DME CPAP supplies:  Mask, hose, tank, filters 1 each 0     polyethylene glycol (GOLYTELY) 236 g suspension The night before the exam at 6 pm drink an 8-ounce glass every 15 minutes until the jug is half empty. If you arrive before 11 AM: Drink the other half of the Golytely jug at 11 PM night before procedure. If you arrive after 11 AM: Drink the other half of the Golytely jug at 6 AM day of procedure. For additional instructions refer to your colonoscopy prep instructions. 4000 mL 0     simvastatin (ZOCOR) 10 MG tablet TAKE 1 TABLET (10 MG) BY MOUTH AT BEDTIME FOR CHOLESTEROL 90 tablet 3     tiZANidine (ZANAFLEX) 2 MG tablet Take 1 tablet (2 mg) by mouth 3 times daily as needed for muscle spasms 40 tablet 3       No Known Allergies    REVIEW OF SYSTEMS:  CONSTITUTIONAL:  NEGATIVE for fever, chills, change in weight, not feeling tired  SKIN:  NEGATIVE for worrisome rashes, no skin lumps, no  "skin ulcers and no non-healing wounds  EYES:  NEGATIVE for vision changes or irritation.  ENT/MOUTH:  NEGATIVE.  No hearing loss, no hoarseness, no difficulty swallowing.  RESP:  NEGATIVE. No cough or shortness of breath.  CV:  NEGATIVE for chest pain, palpitations or peripheral edema  GI:  NEGATIVE for nausea, abdominal pain, heartburn, or change in bowel habits  :  Negative. No dysuria, no hematuria  MUSCULOSKELETAL:  See HPI above  NEURO:  NEGATIVE . No headaches, no dizziness,  no numbness  ENDOCRINE:  NEGATIVE for temperature intolerance, skin/hair changes  HEME/ALLERGY/IMMUNE:  NEGATIVE for bleeding problems  PSYCHIATRIC:  NEGATIVE. no anxiety, no depression.     Exam:  Vitals: /77   Pulse 90   Resp 18   Ht 1.607 m (5' 3.25\")   Wt 85.7 kg (189 lb)   BMI 33.22 kg/m    BMI= Body mass index is 33.22 kg/m .  Constitutional:  healthy, alert and no distress  Neuro: Alert and Oriented x 3, no focal defects.  Psych: Affect normal   Respiratory: Breathing not labored.  Cardiovascular: normal peripheral pulses  Lymph: no adenopathy  Skin: No rashes,worrisome lesions or skin problems  He has full range of motion of both shoulders.  He does have pain reaching overhead with the left shoulder.  He has no tenderness of the AC joint or subacromial space.  He had no pain with resisted internal rotation.  He does have pain with resisted external rotation, abduction on the left.  He has pain with blocking test and empty can test.  Sensation, motor and circulation are intact.    Assessment:  left shoulder pain consistent with rotator cuff pain.  He has been doing strengthening of the shoulder.  Plan:  we will proceed with left shoulder MRI.  Return to clinic after MRI.    Again, thank you for allowing me to participate in the care of your patient.        Sincerely,        Rishabh Logan MD  "

## 2024-04-22 NOTE — PROGRESS NOTES
Cesar Foss is a 64 year old male who is seen as self referral for left shoulder pain.  He has had a long history of problems with this shoulder.  I had performed Neer Leta procedure on 8/2004.  He then had a series of falls and injuries in 2013 requiring strengthening and Voltaren.  No apparent damage was done at that time.  He has now developed increased pain in his left shoulder for the past month.  It is especially problematic if he brings his arm overhead.  He has a shooting pain in the shoulder when lifting overhead.  He has been using the tubing that he had from prior therapy episodes for rotator cuff strengthening.  He is right-hand dominant.  He is a manager in manufacturing.    X-ray shows no arthritis of the left shoulder.  There is good space in the coracoacromial arch.  Distal clavicle has been resected.    Past Medical History:   Diagnosis Date    Allergic rhinitis due to animal dander     Allergy to mold spores     1/27/15 skin tests pos. for:  cat/dog/DM/M/T/G/W     Arthritis     Chondromalacia, knee, left     Chondromalacia, knee, right     Diagnostic skin and sensitization tests (aka ALLERGENS) 01/27/2015    1/27/15 skin tests pos. for:  cat/dog/DM/M/T/G/W    High cholesterol     House dust mite allergy     Hypertension goal BP (blood pressure) < 140/90     LBP (low back pain)     Migraines     Obesity     OREN (obstructive sleep apnea)     PONV (postoperative nausea and vomiting)     S/P arthroscopy of left knee 08/01/2017    Seasonal allergic conjunctivitis     Seasonal allergic rhinitis     Seizure disorder (H)     Thrombocytopenia (H24) 07/11/2017    Uncomplicated asthma     Unspecified asthma(493.90)        Past Surgical History:   Procedure Laterality Date    ABDOMEN SURGERY      LAP BAND    ARTHROSCOPY KNEE Left 07/21/2017    Procedure: ARTHROSCOPY KNEE;  Left knee arthroscopy, meniscectomy, debridement;  Surgeon: Rishabh Logan MD;  Location: [facility]    ARTHROSCOPY KNEE Right  09/01/2017    Procedure: ARTHROSCOPY KNEE;  Right knee arthroscopy, Chrondroplasty;  Surgeon: Rishabh Logan MD;  Location: [facility]    BARIATRIC SURGERY  02/23/2009    LAPAROSCOPIC GASTRIC BANDING 02/23/2009    COLONOSCOPY  04/01/2013    Procedure: COLONOSCOPY;  COLONOSCOPY SCREEN;  Surgeon: Jameel Patel MD;  [facility]: [facility]    COLONOSCOPY N/A 07/17/2023    Procedure: COLONOSCOPY;  Surgeon: Shay Toussaint DO;  Location: [facility]    COMBINED ESOPHAGOSCOPY, GASTROSCOPY, DUODENOSCOPY (EGD) WITH CO2 INSUFFLATION N/A 03/17/2021    Procedure: ESOPHAGOGASTRODUODENOSCOPY, WITH CO2 INSUFFLATION;  Surgeon: Kenneth Sellers MD;  [facility]: [facility]    ESOPHAGOSCOPY, GASTROSCOPY, DUODENOSCOPY (EGD), COMBINED N/A 08/21/2014    Procedure: COMBINED ESOPHAGOSCOPY, GASTROSCOPY, DUODENOSCOPY (EGD), BIOPSY SINGLE OR MULTIPLE;  Surgeon: Jameel Patel MD;  [facility]    ESOPHAGOSCOPY, GASTROSCOPY, DUODENOSCOPY (EGD), COMBINED N/A 03/17/2021    Procedure: Esophagogastroduodenoscopy, With Biopsy;  Surgeon: Kenneth Sellers MD;  [facility]    ESOPHAGOSCOPY, GASTROSCOPY, DUODENOSCOPY (EGD), COMBINED N/A 07/17/2023    Procedure: Esophagoscopy, gastroscopy, duodenoscopy, combined;  Surgeon: Shay Toussaint DO;  Location: [facility]    LAPAROSCOPIC CHOLECYSTECTOMY N/A 09/10/2014    Procedure: LAPAROSCOPIC CHOLECYSTECTOMY;  Surgeon: Jameel Patel MD;  Location: [facility]    LASIK  01/01/2001    ORTHOPEDIC SURGERY  08/20/2004    Left shoulder/clavicle [facility]    TONSILLECTOMY  1968       Family History   Problem Relation Age of Onset    Eye Disorder Mother     Heart Disease Mother         58yo MI.    Lipids Mother     Macular Degeneration Mother     Diabetes Mother     Hypertension Mother     Lipids Father     Cancer Father     Hypertension Father     Alcohol/Drug Brother     Diabetes Sister         hyperglycemic    Obesity Daughter     Glaucoma No family hx of     Cerebrovascular  Disease No family hx of     Thyroid Disease No family hx of        Social History     Socioeconomic History    Marital status: Single     Spouse name: Not on file    Number of children: Not on file    Years of education: Not on file    Highest education level: Not on file   Occupational History    Occupation: -    Tobacco Use    Smoking status: Never    Smokeless tobacco: Never    Tobacco comments:     non smoking household   Vaping Use    Vaping status: Never Used   Substance and Sexual Activity    Alcohol use: Yes     Comment: 2-3/night    Drug use: No    Sexual activity: Yes     Partners: Female     Birth control/protection: Condom   Other Topics Concern    Parent/sibling w/ CABG, MI or angioplasty before 65F 55M? Yes     Comment: MOM   Social History Narrative    Not on file     Social Determinants of Health     Financial Resource Strain: Low Risk  (3/12/2024)    Financial Resource Strain     Within the past 12 months, have you or your family members you live with been unable to get utilities (heat, electricity) when it was really needed?: No   Food Insecurity: Low Risk  (3/12/2024)    Food Insecurity     Within the past 12 months, did you worry that your food would run out before you got money to buy more?: No     Within the past 12 months, did the food you bought just not last and you didn t have money to get more?: No   Transportation Needs: Low Risk  (3/12/2024)    Transportation Needs     Within the past 12 months, has lack of transportation kept you from medical appointments, getting your medicines, non-medical meetings or appointments, work, or from getting things that you need?: No   Physical Activity: Insufficiently Active (3/12/2024)    Exercise Vital Sign     Days of Exercise per Week: 3 days     Minutes of Exercise per Session: 30 min   Stress: No Stress Concern Present (3/12/2024)    [employer] [place] [employer] [place] [employer] - Occupational Stress Questionnaire      Feeling of Stress : Only a little   Social Connections: Unknown (3/12/2024)    Social Connection and Isolation Panel [NHANES]     Frequency of Communication with Friends and Family: Not on file     Frequency of Social Gatherings with Friends and Family: Three times a week     Attends Anabaptist Services: Not on file     Active Member of Clubs or Organizations: Not on file     Attends Club or Organization Meetings: Not on file     Marital Status: Not on file   Interpersonal Safety: Low Risk  (3/18/2024)    Interpersonal Safety     Do you feel physically and emotionally safe where you currently live?: Yes     Within the past 12 months, have you been hit, slapped, kicked or otherwise physically hurt by someone?: No     Within the past 12 months, have you been humiliated or emotionally abused in other ways by your partner or ex-partner?: No   Housing Stability: Low Risk  (3/12/2024)    Housing Stability     Do you have housing? : Yes     Are you worried about losing your housing?: No       Current Outpatient Medications   Medication Sig Dispense Refill    acetaminophen (TYLENOL) 500 MG tablet Take 500-1,000 mg by mouth as needed      albuterol (PROAIR HFA) 108 (90 Base) MCG/ACT inhaler INHALE 2 PUFFS INTO THE LUNGS EVERY 6 HOURS AS NEEDED FOR SHORTNESS OF BREATH / DYSPNEA OR WHEEZING 18 g 2    amLODIPine (NORVASC) 10 MG tablet TAKE 1 TABLET BY MOUTH FOR BLOOD PRESSURE IN THE PM Strength: 10 mg 90 tablet 4    bisacodyl (DULCOLAX) 5 MG EC tablet Take 2 tablets at 3 pm the day before your procedure. If your procedure is before 11 am, take 2 additional tablets at 11 pm. If your procedure is after 11 am, take 2 additional tablets at 6 am. For additional instructions refer to your colonoscopy prep instructions. 4 tablet 0    budesonide (PULMICORT FLEXHALER) 180 MCG/ACT inhaler Inhale 1 puff into the lungs 2 times daily Replaces flovent for insurance 3 each 1    buPROPion (WELLBUTRIN SR) 150 MG 12 hr tablet TAKE 1 TABLET BY  MOUTH EVERY MORNING FOR 7 DAYS, THEN 1 TABLET TWICE A DAY FOR WEIGHT LOSS/ENERGY 180 tablet 1    cyclobenzaprine (FLEXERIL) 10 MG tablet TAKE 1 TABLET (10 MG) BY MOUTH 2 TIMES DAILY AS NEEDED FOR MUSCLE SPASMS (Patient not taking: Reported on 9/19/2023) 30 tablet 1    lisinopril-hydrochlorothiazide (ZESTORETIC) 20-25 MG tablet TAKE 1 TABLET BY MOUTH DAILY THIS IS DOUBLE DOSAGE FOR BLOOD PRESSURE IN AM 90 tablet 0    omeprazole (PRILOSEC) 20 MG DR capsule Take 1 capsule (20 mg) by mouth daily As needed for heartburn/upset stomach 90 capsule 1    omeprazole (PRILOSEC) 20 MG DR capsule Take 1 capsule (20 mg) by mouth daily (Patient not taking: Reported on 3/18/2024) 90 capsule 3    order for DME CPAP supplies:  Mask, hose, tank, filters 1 each 0    polyethylene glycol (GOLYTELY) 236 g suspension The night before the exam at 6 pm drink an 8-ounce glass every 15 minutes until the jug is half empty. If you arrive before 11 AM: Drink the other half of the Golytely jug at 11 PM night before procedure. If you arrive after 11 AM: Drink the other half of the Golytely jug at 6 AM day of procedure. For additional instructions refer to your colonoscopy prep instructions. 4000 mL 0    simvastatin (ZOCOR) 10 MG tablet TAKE 1 TABLET (10 MG) BY MOUTH AT BEDTIME FOR CHOLESTEROL 90 tablet 3    tiZANidine (ZANAFLEX) 2 MG tablet Take 1 tablet (2 mg) by mouth 3 times daily as needed for muscle spasms 40 tablet 3       No Known Allergies    REVIEW OF SYSTEMS:  CONSTITUTIONAL:  NEGATIVE for fever, chills, change in weight, not feeling tired  SKIN:  NEGATIVE for worrisome rashes, no skin lumps, no skin ulcers and no non-healing wounds  EYES:  NEGATIVE for vision changes or irritation.  ENT/MOUTH:  NEGATIVE.  No hearing loss, no hoarseness, no difficulty swallowing.  RESP:  NEGATIVE. No cough or shortness of breath.  CV:  NEGATIVE for chest pain, palpitations or peripheral edema  GI:  NEGATIVE for nausea, abdominal pain, heartburn, or change  "in bowel habits  :  Negative. No dysuria, no hematuria  MUSCULOSKELETAL:  See HPI above  NEURO:  NEGATIVE . No headaches, no dizziness,  no numbness  ENDOCRINE:  NEGATIVE for temperature intolerance, skin/hair changes  HEME/ALLERGY/IMMUNE:  NEGATIVE for bleeding problems  PSYCHIATRIC:  NEGATIVE. no anxiety, no depression.     Exam:  Vitals: /77   Pulse 90   Resp 18   Ht 1.607 m (5' 3.25\")   Wt 85.7 kg (189 lb)   BMI 33.22 kg/m    BMI= Body mass index is 33.22 kg/m .  Constitutional:  healthy, alert and no distress  Neuro: Alert and Oriented x 3, no focal defects.  Psych: Affect normal   Respiratory: Breathing not labored.  Cardiovascular: normal peripheral pulses  Lymph: no adenopathy  Skin: No rashes,worrisome lesions or skin problems  He has full range of motion of both shoulders.  He does have pain reaching overhead with the left shoulder.  He has no tenderness of the AC joint or subacromial space.  He had no pain with resisted internal rotation.  He does have pain with resisted external rotation, abduction on the left.  He has pain with blocking test and empty can test.  Sensation, motor and circulation are intact.    Assessment:  left shoulder pain consistent with rotator cuff pain.  He has been doing strengthening of the shoulder.  Plan:  we will proceed with left shoulder MRI.  Return to clinic after MRI.  "

## 2024-04-23 ENCOUNTER — ANCILLARY PROCEDURE (OUTPATIENT)
Dept: ULTRASOUND IMAGING | Facility: CLINIC | Age: 64
End: 2024-04-23
Attending: INTERNAL MEDICINE
Payer: COMMERCIAL

## 2024-04-23 DIAGNOSIS — D69.6 THROMBOCYTOPENIA (H): ICD-10-CM

## 2024-04-23 PROCEDURE — 76700 US EXAM ABDOM COMPLETE: CPT | Mod: TC | Performed by: RADIOLOGY

## 2024-04-24 ENCOUNTER — PATIENT OUTREACH (OUTPATIENT)
Dept: ONCOLOGY | Facility: HOSPITAL | Age: 64
End: 2024-04-24
Payer: COMMERCIAL

## 2024-04-24 NOTE — PROGRESS NOTES
"[facility]: Cancer Care                                                                                          Called Cesar, per the request of Dr. Valerio, to follow up on the US and follow up plan.  Confirmed that he did read the My Chart message that was sent to him by Dr. Valerio, We reviewed the message together:    \"Your liver continues to show some fatty changes (\"steatosis\") and possibly some fibrosis, which is a kind of scarring.  Probably related, your spleen is a big enlarged, something we talked about as a possible reason for low platelets.     This is something you should discuss with your PCP who might even suggest seeing a liver specialist.\"    He stated understanding of the message. Relayed that per Dr. Valerio, his low platelets are not a result of a blood disease or a cancer. For this reason, he can continue his future follow up with PCP instead of returning to our clinic. He states he will plan to continue care with PCP. Recall to come for follow up will be removed so that he does not get future message from us to follow up. He stated appreciation and understanding.        Signature:  Amarilis Pang RN  "

## 2024-04-28 DIAGNOSIS — K21.00 GASTROESOPHAGEAL REFLUX DISEASE WITH ESOPHAGITIS, UNSPECIFIED WHETHER HEMORRHAGE: ICD-10-CM

## 2024-04-30 ENCOUNTER — MEDICAL CORRESPONDENCE (OUTPATIENT)
Dept: HEALTH INFORMATION MANAGEMENT | Facility: CLINIC | Age: 64
End: 2024-04-30

## 2024-04-30 ENCOUNTER — TRANSFERRED RECORDS (OUTPATIENT)
Dept: HEALTH INFORMATION MANAGEMENT | Facility: CLINIC | Age: 64
End: 2024-04-30
Payer: COMMERCIAL

## 2024-05-05 ENCOUNTER — HOSPITAL ENCOUNTER (OUTPATIENT)
Dept: MRI IMAGING | Facility: CLINIC | Age: 64
Discharge: HOME | End: 2024-05-05
Attending: ORTHOPAEDIC SURGERY | Admitting: ORTHOPAEDIC SURGERY
Payer: COMMERCIAL

## 2024-05-05 DIAGNOSIS — M67.912 ROTATOR CUFF DISORDER, LEFT: ICD-10-CM

## 2024-05-05 DIAGNOSIS — M25.512 LEFT SHOULDER PAIN, UNSPECIFIED CHRONICITY: ICD-10-CM

## 2024-05-05 PROCEDURE — 73221 MRI JOINT UPR EXTREM W/O DYE: CPT | Mod: LT

## 2024-05-05 PROCEDURE — 73221 MRI JOINT UPR EXTREM W/O DYE: CPT | Mod: 26 | Performed by: RADIOLOGY

## 2024-05-13 ENCOUNTER — OFFICE VISIT (OUTPATIENT)
Dept: ORTHOPEDICS | Facility: CLINIC | Age: 64
End: 2024-05-13
Payer: COMMERCIAL

## 2024-05-13 VITALS
BODY MASS INDEX: 33.49 KG/M2 | SYSTOLIC BLOOD PRESSURE: 112 MMHG | HEIGHT: 63 IN | HEART RATE: 76 BPM | WEIGHT: 189 LBS | RESPIRATION RATE: 18 BRPM | DIASTOLIC BLOOD PRESSURE: 68 MMHG

## 2024-05-13 DIAGNOSIS — M25.512 LEFT SHOULDER PAIN, UNSPECIFIED CHRONICITY: Primary | ICD-10-CM

## 2024-05-13 DIAGNOSIS — M75.112 NONTRAUMATIC INCOMPLETE TEAR OF LEFT ROTATOR CUFF: ICD-10-CM

## 2024-05-13 DIAGNOSIS — M75.22 BICEPS TENDINITIS OF LEFT UPPER EXTREMITY: ICD-10-CM

## 2024-05-13 PROCEDURE — 99213 OFFICE O/P EST LOW 20 MIN: CPT | Performed by: ORTHOPAEDIC SURGERY

## 2024-05-13 NOTE — LETTER
5/13/2024         RE: Cesar Foss  [id number] [facility] [id number]        Dear Colleague,    Thank you for referring your patient, Cesar Foss, to the [facility]. Please see a copy of my visit note below.    Cesar Foss is a 64 year old male who is seen as self referral for left shoulder pain.  He has had a long history of problems with this shoulder.  I had performed Neer Leta procedure on 8/2004.  He then had a series of falls and injuries in 2013 requiring strengthening and Voltaren.  No apparent damage was done at that time.  He has now developed increased pain in his left shoulder for the past month.  It is especially problematic if he brings his arm overhead.  He has a shooting pain in the shoulder when lifting overhead.  He has been using the tubing that he had from prior therapy episodes for rotator cuff strengthening.  He is right-hand dominant.  He is a manager in manufacturing.    X-ray shows no arthritis of the left shoulder.  There is good space in the coracoacromial arch.  Distal clavicle has been resected.  MRI scan on 5/5/24 shows incomplete split tearing of biceps tendon, high grade partial tear of upper subscapularis, low grade partial tearing of supraspinatus.    Past Medical History:   Diagnosis Date     Allergic rhinitis due to animal dander      Allergy to mold spores     1/27/15 skin tests pos. for:  cat/dog/DM/M/T/G/W      Arthritis      Chondromalacia, knee, left      Chondromalacia, knee, right      Diagnostic skin and sensitization tests (aka ALLERGENS) 01/27/2015    1/27/15 skin tests pos. for:  cat/dog/DM/M/T/G/W     High cholesterol      House dust mite allergy      Hypertension goal BP (blood pressure) < 140/90      LBP (low back pain)      Migraines      Obesity      OREN (obstructive sleep apnea)      PONV (postoperative nausea and vomiting)      S/P arthroscopy of left knee 08/01/2017     Seasonal allergic conjunctivitis       Seasonal allergic rhinitis      Seizure disorder (H)      Thrombocytopenia (H24) 07/11/2017     Uncomplicated asthma      Unspecified asthma(493.90)        Past Surgical History:   Procedure Laterality Date     ABDOMEN SURGERY      LAP BAND     ARTHROSCOPY KNEE Left 07/21/2017    Procedure: ARTHROSCOPY KNEE;  Left knee arthroscopy, meniscectomy, debridement;  Surgeon: Rishabh Logan MD;  Location: [facility]     ARTHROSCOPY KNEE Right 09/01/2017    Procedure: ARTHROSCOPY KNEE;  Right knee arthroscopy, Chrondroplasty;  Surgeon: Rishabh Logan MD;  Location: [facility]     BARIATRIC SURGERY  02/23/2009    LAPAROSCOPIC GASTRIC BANDING 02/23/2009     COLONOSCOPY  04/01/2013    Procedure: COLONOSCOPY;  COLONOSCOPY SCREEN;  Surgeon: Jameel Patel MD;  Location: [facility]     COLONOSCOPY N/A 07/17/2023    Procedure: COLONOSCOPY;  Surgeon: Shay Toussaint DO;  Location: [facility]     COMBINED ESOPHAGOSCOPY, GASTROSCOPY, DUODENOSCOPY (EGD) WITH CO2 INSUFFLATION N/A 03/17/2021    Procedure: ESOPHAGOGASTRODUODENOSCOPY, WITH CO2 INSUFFLATION;  Surgeon: Kenneth Sellers MD;  [facility]: [facility]     ESOPHAGOSCOPY, GASTROSCOPY, DUODENOSCOPY (EGD), COMBINED N/A 08/21/2014    Procedure: COMBINED ESOPHAGOSCOPY, GASTROSCOPY, DUODENOSCOPY (EGD), BIOPSY SINGLE OR MULTIPLE;  Surgeon: Jameel Patel MD;  [facility]: [facility]     ESOPHAGOSCOPY, GASTROSCOPY, DUODENOSCOPY (EGD), COMBINED N/A 03/17/2021    Procedure: Esophagogastroduodenoscopy, With Biopsy;  Surgeon: Kenneth Sellers MD;  [facility]     ESOPHAGOSCOPY, GASTROSCOPY, DUODENOSCOPY (EGD), COMBINED N/A 07/17/2023    Procedure: Esophagoscopy, gastroscopy, duodenoscopy, combined;  Surgeon: Shay Toussaint DO;  Location: [facility]     LAPAROSCOPIC CHOLECYSTECTOMY N/A 09/10/2014    Procedure: LAPAROSCOPIC CHOLECYSTECTOMY;  Surgeon: Jameel Patel MD;  [facility]: [facility]     LASIK  01/01/2001     ORTHOPEDIC SURGERY Left 08/20/2004    Left Leta Garcia, AMARIL      TONSILLECTOMY  1968       Family History   Problem Relation Age of Onset     Eye Disorder Mother      Heart Disease Mother         58yo MI.     Lipids Mother      Macular Degeneration Mother      Diabetes Mother      Hypertension Mother      Lipids Father      Cancer Father      Hypertension Father      Alcohol/Drug Brother      Diabetes Sister         hyperglycemic     Obesity Daughter      Glaucoma No family hx of      Cerebrovascular Disease No family hx of      Thyroid Disease No family hx of        Social History     Socioeconomic History     Marital status: Single     Spouse name: Not on file     Number of children: Not on file     Years of education: Not on file     Highest education level: Not on file   Occupational History     Occupation: -    Tobacco Use     Smoking status: Never     Smokeless tobacco: Never     Tobacco comments:     non smoking household   Vaping Use     Vaping status: Never Used   Substance and Sexual Activity     Alcohol use: Yes     Comment: 2-3/night     Drug use: No     Sexual activity: Yes     Partners: Female     Birth control/protection: Condom   Other Topics Concern     Parent/sibling w/ CABG, MI or angioplasty before 65F 55M? Yes     Comment: MOM   Social History Narrative     Not on file     Social Determinants of Health     Financial Resource Strain: Low Risk  (3/12/2024)    Financial Resource Strain      Within the past 12 months, have you or your family members you live with been unable to get utilities (heat, electricity) when it was really needed?: No   Food Insecurity: Low Risk  (3/12/2024)    Food Insecurity      Within the past 12 months, did you worry that your food would run out before you got money to buy more?: No      Within the past 12 months, did the food you bought just not last and you didn t have money to get more?: No   Transportation Needs: Low Risk  (3/12/2024)    Transportation Needs      Within the past 12  months, has lack of transportation kept you from medical appointments, getting your medicines, non-medical meetings or appointments, work, or from getting things that you need?: No   Physical Activity: Insufficiently Active (3/12/2024)    Exercise Vital Sign      Days of Exercise per Week: 3 days      Minutes of Exercise per Session: 30 min   Stress: No Stress Concern Present (3/12/2024)    [employer] - Occupational Stress Questionnaire      Feeling of Stress : Only a little   Social Connections: Unknown (3/12/2024)    Social Connection and Isolation Panel [NHANES]      Frequency of Communication with Friends and Family: Not on file      Frequency of Social Gatherings with Friends and Family: Three times a week      Attends Yazidi Services: Not on file      Active Member of Clubs or Organizations: Not on file      Attends Club or Organization Meetings: Not on file      Marital Status: Not on file   Interpersonal Safety: Low Risk  (3/18/2024)    Interpersonal Safety      Do you feel physically and emotionally safe where you currently live?: Yes      Within the past 12 months, have you been hit, slapped, kicked or otherwise physically hurt by someone?: No      Within the past 12 months, have you been humiliated or emotionally abused in other ways by your partner or ex-partner?: No   Housing Stability: Low Risk  (3/12/2024)    Housing Stability      Do you have housing? : Yes      Are you worried about losing your housing?: No       Current Outpatient Medications   Medication Sig Dispense Refill     acetaminophen (TYLENOL) 500 MG tablet Take 500-1,000 mg by mouth as needed       albuterol (PROAIR HFA) 108 (90 Base) MCG/ACT inhaler INHALE 2 PUFFS INTO THE LUNGS EVERY 6 HOURS AS NEEDED FOR SHORTNESS OF BREATH / DYSPNEA OR WHEEZING 18 g 2     amLODIPine (NORVASC) 10 MG tablet TAKE 1 TABLET BY MOUTH FOR BLOOD PRESSURE IN THE PM Strength: 10 mg 90 tablet 4     bisacodyl (DULCOLAX) 5 MG EC  tablet Take 2 tablets at 3 pm the day before your procedure. If your procedure is before 11 am, take 2 additional tablets at 11 pm. If your procedure is after 11 am, take 2 additional tablets at 6 am. For additional instructions refer to your colonoscopy prep instructions. 4 tablet 0     budesonide (PULMICORT FLEXHALER) 180 MCG/ACT inhaler Inhale 1 puff into the lungs 2 times daily Replaces flovent for insurance 3 each 1     buPROPion (WELLBUTRIN SR) 150 MG 12 hr tablet TAKE 1 TABLET BY MOUTH EVERY MORNING FOR 7 DAYS, THEN 1 TABLET TWICE A DAY FOR WEIGHT LOSS/ENERGY 180 tablet 1     cyclobenzaprine (FLEXERIL) 10 MG tablet TAKE 1 TABLET (10 MG) BY MOUTH 2 TIMES DAILY AS NEEDED FOR MUSCLE SPASMS (Patient not taking: Reported on 9/19/2023) 30 tablet 1     lisinopril-hydrochlorothiazide (ZESTORETIC) 20-25 MG tablet TAKE 1 TABLET BY MOUTH DAILY THIS IS DOUBLE DOSAGE FOR BLOOD PRESSURE IN AM 90 tablet 0     omeprazole (PRILOSEC) 20 MG DR capsule TAKE 1 CAPSULE (20 MG) BY MOUTH DAILY AS NEEDED FOR HEARTBURN/UPSET STOMACH 90 capsule 2     omeprazole (PRILOSEC) 20 MG DR capsule Take 1 capsule (20 mg) by mouth daily (Patient not taking: Reported on 3/18/2024) 90 capsule 3     order for DME CPAP supplies:  Mask, hose, tank, filters 1 each 0     polyethylene glycol (GOLYTELY) 236 g suspension The night before the exam at 6 pm drink an 8-ounce glass every 15 minutes until the jug is half empty. If you arrive before 11 AM: Drink the other half of the Golytely jug at 11 PM night before procedure. If you arrive after 11 AM: Drink the other half of the Golytely jug at 6 AM day of procedure. For additional instructions refer to your colonoscopy prep instructions. 4000 mL 0     simvastatin (ZOCOR) 10 MG tablet TAKE 1 TABLET (10 MG) BY MOUTH AT BEDTIME FOR CHOLESTEROL 90 tablet 3     tiZANidine (ZANAFLEX) 2 MG tablet Take 1 tablet (2 mg) by mouth 3 times daily as needed for muscle spasms 40 tablet 3       No Known Allergies    REVIEW  "OF SYSTEMS:  CONSTITUTIONAL:  NEGATIVE for fever, chills, change in weight, not feeling tired  SKIN:  NEGATIVE for worrisome rashes, no skin lumps, no skin ulcers and no non-healing wounds  EYES:  NEGATIVE for vision changes or irritation.  ENT/MOUTH:  NEGATIVE.  No hearing loss, no hoarseness, no difficulty swallowing.  RESP:  NEGATIVE. No cough or shortness of breath.  CV:  NEGATIVE for chest pain, palpitations or peripheral edema  GI:  NEGATIVE for nausea, abdominal pain, heartburn, or change in bowel habits  :  Negative. No dysuria, no hematuria  MUSCULOSKELETAL:  See HPI above  NEURO:  NEGATIVE . No headaches, no dizziness,  no numbness  ENDOCRINE:  NEGATIVE for temperature intolerance, skin/hair changes  HEME/ALLERGY/IMMUNE:  NEGATIVE for bleeding problems  PSYCHIATRIC:  NEGATIVE. no anxiety, no depression.     Exam:  Vitals: /68   Pulse 76   Resp 18   Ht 1.607 m (5' 3.25\")   Wt 85.7 kg (189 lb)   BMI 33.22 kg/m    BMI= Body mass index is 33.22 kg/m .  Constitutional:  healthy, alert and no distress  Neuro: Alert and Oriented x 3, no focal defects.  Psych: Affect normal   Respiratory: Breathing not labored.  Cardiovascular: normal peripheral pulses  Lymph: no adenopathy  Skin: No rashes,worrisome lesions or skin problems  He has full range of motion of both shoulders.  He does have pain reaching overhead with the left shoulder.  He has no tenderness of the AC joint or subacromial space.  He does have tenderness at bicipital groove.  He had no pain with resisted internal rotation.  He does have pain with resisted external rotation, abduction on the left.  He has pain with blocking test and empty can test.  Sensation, motor and circulation are intact.    Assessment:  1.  left shoulder pain consistent with rotator cuff pain, but only low grade partial tearing of supraspinatus.  2. Partial split tearing of biceps tendon.  This may be main source of pain.  3. High grade partial subscapularis tear, but " no pain clinically from use.    Plan:  refer for bicipital groove injection.  We hope for pain relief.  Even if biceps would rupture, this would relieve pain.  If pain persists, we will consider biceps tenodesis with rotator cuff evaluation.      Again, thank you for allowing me to participate in the care of your patient.        Sincerely,        Rishabh Logan MD

## 2024-05-13 NOTE — PROGRESS NOTES
Cesar Foss is a 64 year old male who is seen as self referral for left shoulder pain.  He has had a long history of problems with this shoulder.  I had performed Neer Leta procedure on 8/2004.  He then had a series of falls and injuries in 2013 requiring strengthening and Voltaren.  No apparent damage was done at that time.  He has now developed increased pain in his left shoulder for the past month.  It is especially problematic if he brings his arm overhead.  He has a shooting pain in the shoulder when lifting overhead.  He has been using the tubing that he had from prior therapy episodes for rotator cuff strengthening.  He is right-hand dominant.  He is a manager in manufacturing.    X-ray shows no arthritis of the left shoulder.  There is good space in the coracoacromial arch.  Distal clavicle has been resected.  MRI scan on 5/5/24 shows incomplete split tearing of biceps tendon, high grade partial tear of upper subscapularis, low grade partial tearing of supraspinatus.    Past Medical History:   Diagnosis Date    Allergic rhinitis due to animal dander     Allergy to mold spores     1/27/15 skin tests pos. for:  cat/dog/DM/M/T/G/W     Arthritis     Chondromalacia, knee, left     Chondromalacia, knee, right     Diagnostic skin and sensitization tests (aka ALLERGENS) 01/27/2015    1/27/15 skin tests pos. for:  cat/dog/DM/M/T/G/W    High cholesterol     House dust mite allergy     Hypertension goal BP (blood pressure) < 140/90     LBP (low back pain)     Migraines     Obesity     OREN (obstructive sleep apnea)     PONV (postoperative nausea and vomiting)     S/P arthroscopy of left knee 08/01/2017    Seasonal allergic conjunctivitis     Seasonal allergic rhinitis     Seizure disorder (H)     Thrombocytopenia (H24) 07/11/2017    Uncomplicated asthma     Unspecified asthma(493.90)        Past Surgical History:   Procedure Laterality Date    ABDOMEN SURGERY      LAP BAND    ARTHROSCOPY KNEE Left 07/21/2017     Procedure: ARTHROSCOPY KNEE;  Left knee arthroscopy, meniscectomy, debridement;  Surgeon: Rishabh Logan MD;  [facility]: [facility]    ARTHROSCOPY KNEE Right 09/01/2017    Procedure: ARTHROSCOPY KNEE;  Right knee arthroscopy, Chrondroplasty;  Surgeon: Rishabh Logan MD;  Location: [facility]    BARIATRIC SURGERY  02/23/2009    LAPAROSCOPIC GASTRIC BANDING 02/23/2009    COLONOSCOPY  04/01/2013    Procedure: COLONOSCOPY;  COLONOSCOPY SCREEN;  Surgeon: Jameel Patel MD;  [facility]: [facility]    COLONOSCOPY N/A 07/17/2023    Procedure: COLONOSCOPY;  Surgeon: Shay Toussaint DO;  Location: [facility]    COMBINED ESOPHAGOSCOPY, GASTROSCOPY, DUODENOSCOPY (EGD) WITH CO2 INSUFFLATION N/A 03/17/2021    Procedure: ESOPHAGOGASTRODUODENOSCOPY, WITH CO2 INSUFFLATION;  Surgeon: Kenneth Sellers MD;  [facility]: [facility]    ESOPHAGOSCOPY, GASTROSCOPY, DUODENOSCOPY (EGD), COMBINED N/A 08/21/2014    Procedure: COMBINED ESOPHAGOSCOPY, GASTROSCOPY, DUODENOSCOPY (EGD), BIOPSY SINGLE OR MULTIPLE;  Surgeon: Jameel Patel MD;  [facility]    ESOPHAGOSCOPY, GASTROSCOPY, DUODENOSCOPY (EGD), COMBINED N/A 03/17/2021    Procedure: Esophagogastroduodenoscopy, With Biopsy;  Surgeon: Kenneth Sellers MD;  [facility]    ESOPHAGOSCOPY, GASTROSCOPY, DUODENOSCOPY (EGD), COMBINED N/A 07/17/2023    Procedure: Esophagoscopy, gastroscopy, duodenoscopy, combined;  Surgeon: Shay Toussaint DO;  Location: [facility]    LAPAROSCOPIC CHOLECYSTECTOMY N/A 09/10/2014    Procedure: LAPAROSCOPIC CHOLECYSTECTOMY;  Surgeon: Jameel Patel MD;  [facility]: [facility]    LASIK  01/01/2001    ORTHOPEDIC SURGERY Left 08/20/2004    Left Leta Garcia, YINKA    TONSILLECTOMY  1968       Family History   Problem Relation Age of Onset    Eye Disorder Mother     Heart Disease Mother         60yo MI.    Lipids Mother     Macular Degeneration Mother     Diabetes Mother     Hypertension Mother     Lipids Father     Cancer Father     Hypertension  Father     Alcohol/Drug Brother     Diabetes Sister         hyperglycemic    Obesity Daughter     Glaucoma No family hx of     Cerebrovascular Disease No family hx of     Thyroid Disease No family hx of        Social History     Socioeconomic History    Marital status: Single     Spouse name: Not on file    Number of children: Not on file    Years of education: Not on file    Highest education level: Not on file   Occupational History    Occupation: -    Tobacco Use    Smoking status: Never    Smokeless tobacco: Never    Tobacco comments:     non smoking household   Vaping Use    Vaping status: Never Used   Substance and Sexual Activity    Alcohol use: Yes     Comment: 2-3/night    Drug use: No    Sexual activity: Yes     Partners: Female     Birth control/protection: Condom   Other Topics Concern    Parent/sibling w/ CABG, MI or angioplasty before 65F 55M? Yes     Comment: MOM   Social History Narrative    Not on file     Social Determinants of Health     Financial Resource Strain: Low Risk  (3/12/2024)    Financial Resource Strain     Within the past 12 months, have you or your family members you live with been unable to get utilities (heat, electricity) when it was really needed?: No   Food Insecurity: Low Risk  (3/12/2024)    Food Insecurity     Within the past 12 months, did you worry that your food would run out before you got money to buy more?: No     Within the past 12 months, did the food you bought just not last and you didn t have money to get more?: No   Transportation Needs: Low Risk  (3/12/2024)    Transportation Needs     Within the past 12 months, has lack of transportation kept you from medical appointments, getting your medicines, non-medical meetings or appointments, work, or from getting things that you need?: No   Physical Activity: Insufficiently Active (3/12/2024)    Exercise Vital Sign     Days of Exercise per Week: 3 days     Minutes of Exercise per  Session: 30 min   Stress: No Stress Concern Present (3/12/2024)    [employer] - Occupational Stress Questionnaire     Feeling of Stress : Only a little   Social Connections: Unknown (3/12/2024)    Social Connection and Isolation Panel [NHANES]     Frequency of Communication with Friends and Family: Not on file     Frequency of Social Gatherings with Friends and Family: Three times a week     Attends Yarsanism Services: Not on file     Active Member of Clubs or Organizations: Not on file     Attends Club or Organization Meetings: Not on file     Marital Status: Not on file   Interpersonal Safety: Low Risk  (3/18/2024)    Interpersonal Safety     Do you feel physically and emotionally safe where you currently live?: Yes     Within the past 12 months, have you been hit, slapped, kicked or otherwise physically hurt by someone?: No     Within the past 12 months, have you been humiliated or emotionally abused in other ways by your partner or ex-partner?: No   Housing Stability: Low Risk  (3/12/2024)    Housing Stability     Do you have housing? : Yes     Are you worried about losing your housing?: No       Current Outpatient Medications   Medication Sig Dispense Refill    acetaminophen (TYLENOL) 500 MG tablet Take 500-1,000 mg by mouth as needed      albuterol (PROAIR HFA) 108 (90 Base) MCG/ACT inhaler INHALE 2 PUFFS INTO THE LUNGS EVERY 6 HOURS AS NEEDED FOR SHORTNESS OF BREATH / DYSPNEA OR WHEEZING 18 g 2    amLODIPine (NORVASC) 10 MG tablet TAKE 1 TABLET BY MOUTH FOR BLOOD PRESSURE IN THE PM Strength: 10 mg 90 tablet 4    bisacodyl (DULCOLAX) 5 MG EC tablet Take 2 tablets at 3 pm the day before your procedure. If your procedure is before 11 am, take 2 additional tablets at 11 pm. If your procedure is after 11 am, take 2 additional tablets at 6 am. For additional instructions refer to your colonoscopy prep instructions. 4 tablet 0    budesonide (PULMICORT FLEXHALER) 180 MCG/ACT inhaler  Inhale 1 puff into the lungs 2 times daily Replaces flovent for insurance 3 each 1    buPROPion (WELLBUTRIN SR) 150 MG 12 hr tablet TAKE 1 TABLET BY MOUTH EVERY MORNING FOR 7 DAYS, THEN 1 TABLET TWICE A DAY FOR WEIGHT LOSS/ENERGY 180 tablet 1    cyclobenzaprine (FLEXERIL) 10 MG tablet TAKE 1 TABLET (10 MG) BY MOUTH 2 TIMES DAILY AS NEEDED FOR MUSCLE SPASMS (Patient not taking: Reported on 9/19/2023) 30 tablet 1    lisinopril-hydrochlorothiazide (ZESTORETIC) 20-25 MG tablet TAKE 1 TABLET BY MOUTH DAILY THIS IS DOUBLE DOSAGE FOR BLOOD PRESSURE IN AM 90 tablet 0    omeprazole (PRILOSEC) 20 MG DR capsule TAKE 1 CAPSULE (20 MG) BY MOUTH DAILY AS NEEDED FOR HEARTBURN/UPSET STOMACH 90 capsule 2    omeprazole (PRILOSEC) 20 MG DR capsule Take 1 capsule (20 mg) by mouth daily (Patient not taking: Reported on 3/18/2024) 90 capsule 3    order for DME CPAP supplies:  Mask, hose, tank, filters 1 each 0    polyethylene glycol (GOLYTELY) 236 g suspension The night before the exam at 6 pm drink an 8-ounce glass every 15 minutes until the jug is half empty. If you arrive before 11 AM: Drink the other half of the Golytely jug at 11 PM night before procedure. If you arrive after 11 AM: Drink the other half of the Golytely jug at 6 AM day of procedure. For additional instructions refer to your colonoscopy prep instructions. 4000 mL 0    simvastatin (ZOCOR) 10 MG tablet TAKE 1 TABLET (10 MG) BY MOUTH AT BEDTIME FOR CHOLESTEROL 90 tablet 3    tiZANidine (ZANAFLEX) 2 MG tablet Take 1 tablet (2 mg) by mouth 3 times daily as needed for muscle spasms 40 tablet 3       No Known Allergies    REVIEW OF SYSTEMS:  CONSTITUTIONAL:  NEGATIVE for fever, chills, change in weight, not feeling tired  SKIN:  NEGATIVE for worrisome rashes, no skin lumps, no skin ulcers and no non-healing wounds  EYES:  NEGATIVE for vision changes or irritation.  ENT/MOUTH:  NEGATIVE.  No hearing loss, no hoarseness, no difficulty swallowing.  RESP:  NEGATIVE. No cough or  "shortness of breath.  CV:  NEGATIVE for chest pain, palpitations or peripheral edema  GI:  NEGATIVE for nausea, abdominal pain, heartburn, or change in bowel habits  :  Negative. No dysuria, no hematuria  MUSCULOSKELETAL:  See HPI above  NEURO:  NEGATIVE . No headaches, no dizziness,  no numbness  ENDOCRINE:  NEGATIVE for temperature intolerance, skin/hair changes  HEME/ALLERGY/IMMUNE:  NEGATIVE for bleeding problems  PSYCHIATRIC:  NEGATIVE. no anxiety, no depression.     Exam:  Vitals: /68   Pulse 76   Resp 18   Ht 1.607 m (5' 3.25\")   Wt 85.7 kg (189 lb)   BMI 33.22 kg/m    BMI= Body mass index is 33.22 kg/m .  Constitutional:  healthy, alert and no distress  Neuro: Alert and Oriented x 3, no focal defects.  Psych: Affect normal   Respiratory: Breathing not labored.  Cardiovascular: normal peripheral pulses  Lymph: no adenopathy  Skin: No rashes,worrisome lesions or skin problems  He has full range of motion of both shoulders.  He does have pain reaching overhead with the left shoulder.  He has no tenderness of the AC joint or subacromial space.  He does have tenderness at bicipital groove.  He had no pain with resisted internal rotation.  He does have pain with resisted external rotation, abduction on the left.  He has pain with blocking test and empty can test.  Sensation, motor and circulation are intact.    Assessment:  1.  left shoulder pain consistent with rotator cuff pain, but only low grade partial tearing of supraspinatus.  2. Partial split tearing of biceps tendon.  This may be main source of pain.  3. High grade partial subscapularis tear, but no pain clinically from use.    Plan:  refer for bicipital groove injection.  We hope for pain relief.  Even if biceps would rupture, this would relieve pain.  If pain persists, we will consider biceps tenodesis with rotator cuff evaluation.  "

## 2024-05-14 ENCOUNTER — MYC REFILL (OUTPATIENT)
Dept: FAMILY MEDICINE | Facility: CLINIC | Age: 64
End: 2024-05-14
Payer: COMMERCIAL

## 2024-05-14 DIAGNOSIS — I10 ESSENTIAL HYPERTENSION WITH GOAL BLOOD PRESSURE LESS THAN 140/90: ICD-10-CM

## 2024-05-14 RX ORDER — LISINOPRIL AND HYDROCHLOROTHIAZIDE 20; 25 MG/1; MG/1
1 TABLET ORAL DAILY
Qty: 90 TABLET | Refills: 2 | Status: SHIPPED | OUTPATIENT
Start: 2024-05-14

## 2024-05-28 ENCOUNTER — OFFICE VISIT (OUTPATIENT)
Dept: ORTHOPEDICS | Facility: CLINIC | Age: 64
End: 2024-05-28
Attending: ORTHOPAEDIC SURGERY
Payer: COMMERCIAL

## 2024-05-28 DIAGNOSIS — M25.512 LEFT SHOULDER PAIN, UNSPECIFIED CHRONICITY: ICD-10-CM

## 2024-05-28 DIAGNOSIS — G47.33 OBSTRUCTIVE SLEEP APNEA (ADULT) (PEDIATRIC): Primary | ICD-10-CM

## 2024-05-28 PROCEDURE — 76942 ECHO GUIDE FOR BIOPSY: CPT | Performed by: FAMILY MEDICINE

## 2024-05-28 PROCEDURE — 20550 NJX 1 TENDON SHEATH/LIGAMENT: CPT | Performed by: FAMILY MEDICINE

## 2024-05-28 RX ORDER — ROPIVACAINE HYDROCHLORIDE 5 MG/ML
2 INJECTION, SOLUTION EPIDURAL; INFILTRATION; PERINEURAL
Status: SHIPPED | OUTPATIENT
Start: 2024-05-28

## 2024-05-28 RX ORDER — BETAMETHASONE SODIUM PHOSPHATE AND BETAMETHASONE ACETATE 3; 3 MG/ML; MG/ML
6 INJECTION, SUSPENSION INTRA-ARTICULAR; INTRALESIONAL; INTRAMUSCULAR; SOFT TISSUE
Status: SHIPPED | OUTPATIENT
Start: 2024-05-28

## 2024-05-28 RX ADMIN — ROPIVACAINE HYDROCHLORIDE 2 ML: 5 INJECTION, SOLUTION EPIDURAL; INFILTRATION; PERINEURAL at 08:19

## 2024-05-28 RX ADMIN — BETAMETHASONE SODIUM PHOSPHATE AND BETAMETHASONE ACETATE 6 MG: 3; 3 INJECTION, SUSPENSION INTRA-ARTICULAR; INTRALESIONAL; INTRAMUSCULAR; SOFT TISSUE at 08:19

## 2024-05-28 NOTE — LETTER
5/28/2024         RE: Cesar Foss  [address] [facility] [id number]        Dear Colleague,    Thank you for referring your patient, Cesar Foss, to the [facility]. Please see a copy of my visit note below.    Medium Joint Injection/Arthrocentesis    Date/Time: 5/28/2024 8:19 AM    Performed by: Grupo Doe MD  Authorized by: Grupo Doe MD    Indications:  Pain  Needle Size:  25 G  Guidance: ultrasound    Approach:  Anterior  Location:  Shoulder  Location comment:  Left proximal biceps tendon   Medications:  6 mg betamethasone acet & sod phos 6 (3-3) MG/ML; 2 mL ROPivacaine 5 MG/ML  Outcome:  Tolerated well, no immediate complications  Procedure discussed: discussed risks, benefits, and alternatives    Consent Given by:  Patient  Timeout: timeout called immediately prior to procedure    Prep: patient was prepped and draped in usual sterile fashion     Ultrasound images of procedure were permanently stored.   Referred by Dr. Logan     Patient reported some improvement of pain after the numbing portion left biceps tendon steroid injection.  Ultrasound guided images were permanently stored.  Aftercare instructions given to patient.  Plan to follow-up as previously discussed with referring provider.     Grupo Doe MD [facility]            Again, thank you for allowing me to participate in the care of your patient.        Sincerely,        Grupo Doe MD

## 2024-05-28 NOTE — PATIENT INSTRUCTIONS
[facility] Injection Discharge Instructions    Procedure: Left biceps tendon steroid injection    You may shower, however avoid swimming, tub baths or hot tubs for 24 hours following your procedure  You may have a mild to moderate increase in pain for several days following the injection.  It may take up to 14 days for the steroid medication to start working although you may feel the effect as early as a few days after the procedure.  You may use ice packs for 10-15 minutes, 3 to 4 times a day at the injection site for comfort  You may use anti-inflammatory medications (such as Ibuprofen or Aleve or Advil) or Tylenol for pain control if necessary  If you were fasting, you may resume your normal diet and medications after the procedure  If you have diabetes, check your blood sugar more frequently than usual as your blood sugar may be higher than normal for 10-14 days following a steroid injection. Contact your doctor who manages your diabetes if your blood sugar is higher than usual    If you experience any of the following, call [facility] @ [phone] or [phone]  -Fever over 100 degree F  -Swelling, bleeding, redness, drainage, warmth at the injection site  - New or worsening pain     It was great seeing you today!    Grupo Doe

## 2024-05-28 NOTE — PROGRESS NOTES
Medium Joint Injection/Arthrocentesis    Date/Time: 5/28/2024 8:19 AM    Performed by: Grupo Doe MD  Authorized by: Grupo Doe MD    Indications:  Pain  Needle Size:  25 G  Guidance: ultrasound    Approach:  Anterior  Location:  Shoulder  Location comment:  Left proximal biceps tendon   Medications:  6 mg betamethasone acet & sod phos 6 (3-3) MG/ML; 2 mL ROPivacaine 5 MG/ML  Outcome:  Tolerated well, no immediate complications  Procedure discussed: discussed risks, benefits, and alternatives    Consent Given by:  Patient  Timeout: timeout called immediately prior to procedure    Prep: patient was prepped and draped in usual sterile fashion     Ultrasound images of procedure were permanently stored.   Referred by Dr. Loagn     Patient reported some improvement of pain after the numbing portion left biceps tendon steroid injection.  Ultrasound guided images were permanently stored.  Aftercare instructions given to patient.  Plan to follow-up as previously discussed with referring provider.     Grupo Doe MD [facility]

## 2024-06-05 ENCOUNTER — HOSPITAL ENCOUNTER (OUTPATIENT)
Dept: MRI IMAGING | Facility: CLINIC | Age: 64
Discharge: HOME | End: 2024-06-05
Attending: STUDENT IN AN ORGANIZED HEALTH CARE EDUCATION/TRAINING PROGRAM
Payer: COMMERCIAL

## 2024-06-05 ENCOUNTER — HOSPITAL ENCOUNTER (OUTPATIENT)
Dept: GENERAL RADIOLOGY | Facility: CLINIC | Age: 64
Discharge: HOME | End: 2024-06-05
Attending: STUDENT IN AN ORGANIZED HEALTH CARE EDUCATION/TRAINING PROGRAM
Payer: COMMERCIAL

## 2024-06-05 DIAGNOSIS — M54.50 LUMBAR PAIN: ICD-10-CM

## 2024-06-05 DIAGNOSIS — M25.552 HIP PAIN, LEFT: ICD-10-CM

## 2024-06-05 PROCEDURE — 250N000011 HC RX IP 250 OP 636: Performed by: RADIOLOGY

## 2024-06-05 PROCEDURE — 27093 INJECTION FOR HIP X-RAY: CPT | Mod: LT

## 2024-06-05 PROCEDURE — 255N000002 HC RX 255 OP 636: Performed by: RADIOLOGY

## 2024-06-05 PROCEDURE — 250N000009 HC RX 250: Performed by: RADIOLOGY

## 2024-06-05 PROCEDURE — 77002 NEEDLE LOCALIZATION BY XRAY: CPT

## 2024-06-05 PROCEDURE — 73722 MRI JOINT OF LWR EXTR W/DYE: CPT | Mod: LT

## 2024-06-05 PROCEDURE — A9585 GADOBUTROL INJECTION: HCPCS | Performed by: RADIOLOGY

## 2024-06-05 PROCEDURE — 96372 THER/PROPH/DIAG INJ SC/IM: CPT | Performed by: RADIOLOGY

## 2024-06-05 RX ORDER — GADOBUTROL 604.72 MG/ML
0.1 INJECTION INTRAVENOUS ONCE
Status: COMPLETED | OUTPATIENT
Start: 2024-06-05 | End: 2024-06-05

## 2024-06-05 RX ORDER — BUPIVACAINE HYDROCHLORIDE 2.5 MG/ML
10 INJECTION, SOLUTION EPIDURAL; INFILTRATION; INTRACAUDAL ONCE
Status: COMPLETED | OUTPATIENT
Start: 2024-06-05 | End: 2024-06-05

## 2024-06-05 RX ORDER — IOPAMIDOL 510 MG/ML
100 INJECTION, SOLUTION INTRAVASCULAR ONCE
Status: COMPLETED | OUTPATIENT
Start: 2024-06-05 | End: 2024-06-05

## 2024-06-05 RX ORDER — EPINEPHRINE 1 MG/ML
0.1 INJECTION, SOLUTION, CONCENTRATE INTRAVENOUS ONCE
Qty: 0.1 ML | Refills: 0 | Status: COMPLETED | OUTPATIENT
Start: 2024-06-05 | End: 2024-06-05

## 2024-06-05 RX ORDER — LIDOCAINE HYDROCHLORIDE 10 MG/ML
5 INJECTION, SOLUTION EPIDURAL; INFILTRATION; INTRACAUDAL; PERINEURAL ONCE
Status: COMPLETED | OUTPATIENT
Start: 2024-06-05 | End: 2024-06-05

## 2024-06-05 RX ADMIN — GADOBUTROL 0.05 ML: 604.72 INJECTION INTRAVENOUS at 10:30

## 2024-06-05 RX ADMIN — LIDOCAINE HYDROCHLORIDE 5 ML: 10 INJECTION, SOLUTION EPIDURAL; INFILTRATION; INTRACAUDAL; PERINEURAL at 10:18

## 2024-06-05 RX ADMIN — IOPAMIDOL 100 ML: 510 INJECTION, SOLUTION INTRAVASCULAR at 10:30

## 2024-06-05 RX ADMIN — BUPIVACAINE HYDROCHLORIDE 7 ML: 2.5 INJECTION, SOLUTION EPIDURAL; INFILTRATION; INTRACAUDAL; PERINEURAL at 10:30

## 2024-06-05 RX ADMIN — EPINEPHRINE 0.1 ML: 1 INJECTION INTRAMUSCULAR; INTRAVENOUS; SUBCUTANEOUS at 10:38

## 2024-06-17 PROBLEM — Z71.89 ADVANCED DIRECTIVES, COUNSELING/DISCUSSION: Status: RESOLVED | Noted: 2017-06-06 | Resolved: 2024-06-17

## 2024-06-25 ENCOUNTER — TRANSFERRED RECORDS (OUTPATIENT)
Dept: HEALTH INFORMATION MANAGEMENT | Facility: CLINIC | Age: 64
End: 2024-06-25
Payer: COMMERCIAL

## 2024-06-27 ENCOUNTER — MYC REFILL (OUTPATIENT)
Dept: FAMILY MEDICINE | Facility: CLINIC | Age: 64
End: 2024-06-27
Payer: COMMERCIAL

## 2024-06-27 DIAGNOSIS — E78.5 HYPERLIPIDEMIA LDL GOAL <130: ICD-10-CM

## 2024-06-27 RX ORDER — SIMVASTATIN 10 MG
10 TABLET ORAL AT BEDTIME
Qty: 90 TABLET | Refills: 1 | Status: SHIPPED | OUTPATIENT
Start: 2024-06-27

## 2024-07-03 ENCOUNTER — TRANSFERRED RECORDS (OUTPATIENT)
Dept: HEALTH INFORMATION MANAGEMENT | Facility: CLINIC | Age: 64
End: 2024-07-03
Payer: COMMERCIAL

## 2024-07-22 ENCOUNTER — TELEPHONE (OUTPATIENT)
Dept: FAMILY MEDICINE | Facility: CLINIC | Age: 64
End: 2024-07-22
Payer: COMMERCIAL

## 2024-07-22 DIAGNOSIS — I10 ESSENTIAL HYPERTENSION WITH GOAL BLOOD PRESSURE LESS THAN 140/90: ICD-10-CM

## 2024-07-22 DIAGNOSIS — E66.01 MORBID OBESITY (H): ICD-10-CM

## 2024-07-22 DIAGNOSIS — R53.83 OTHER FATIGUE: ICD-10-CM

## 2024-07-23 RX ORDER — BUPROPION HYDROCHLORIDE 150 MG/1
TABLET, EXTENDED RELEASE ORAL
Qty: 180 TABLET | Refills: 1 | Status: SHIPPED | OUTPATIENT
Start: 2024-07-23

## 2024-07-23 RX ORDER — AMLODIPINE BESYLATE 10 MG/1
TABLET ORAL
Qty: 90 TABLET | Refills: 4 | OUTPATIENT
Start: 2024-07-23

## 2024-07-23 NOTE — TELEPHONE ENCOUNTER
For Bupropion - last signed in 01/2023    Break in med greater than 90 days requires triage RN to contact clinic to clarify reason to gap in med, then route refill request to primary care provider.    Please contact patient  to clarify gap in medication, then route to primary care provider.

## 2024-07-23 NOTE — TELEPHONE ENCOUNTER
This writer attempted to contact Cesar on 07/23/24      Reason for call clarify medication request from pharmacy, last prescribed January 2023 x 6 months (would have been out by July 2023)  and left message to call back to [facility].      If patient calls back:   Registered Nurse called. Clarify reason for request. Was this autogenerated from pharmacy? Or is patient asking to get a refill or re-start this medication? If so, should have a follow up visit with PCP first, to discuss, since has been so long since last prescribed.      Roseanna Acosta RN  Clinical Triage/Primary Care  [facility]

## 2024-12-11 ENCOUNTER — MYC MEDICAL ADVICE (OUTPATIENT)
Dept: FAMILY MEDICINE | Facility: CLINIC | Age: 64
End: 2024-12-11
Payer: COMMERCIAL

## 2024-12-11 DIAGNOSIS — Z00.00 PREVENTATIVE HEALTH CARE: Primary | ICD-10-CM

## 2024-12-11 DIAGNOSIS — E78.5 HYPERLIPIDEMIA LDL GOAL <130: Chronic | ICD-10-CM

## 2024-12-11 DIAGNOSIS — Z12.5 SCREENING PSA (PROSTATE SPECIFIC ANTIGEN): ICD-10-CM

## 2024-12-11 NOTE — TELEPHONE ENCOUNTER
Patient is scheduled for labs and physical in March 2025. Please place lab orders.Megan Hoff [facility]

## 2024-12-11 NOTE — TELEPHONE ENCOUNTER
No appointment made yet. Please place lab order and route back to TC to inform patient.Megan PATEL [facility]
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
## 2025-01-27 ENCOUNTER — OFFICE VISIT (OUTPATIENT)
Dept: ORTHOPEDICS | Facility: CLINIC | Age: 65
End: 2025-01-27
Payer: COMMERCIAL

## 2025-01-27 ENCOUNTER — ANCILLARY PROCEDURE (OUTPATIENT)
Dept: GENERAL RADIOLOGY | Facility: CLINIC | Age: 65
End: 2025-01-27
Attending: PEDIATRICS
Payer: COMMERCIAL

## 2025-01-27 DIAGNOSIS — M17.11 ARTHRITIS OF RIGHT KNEE: ICD-10-CM

## 2025-01-27 DIAGNOSIS — S89.91XA RIGHT KNEE INJURY, INITIAL ENCOUNTER: Primary | ICD-10-CM

## 2025-01-27 DIAGNOSIS — S89.91XA RIGHT KNEE INJURY, INITIAL ENCOUNTER: ICD-10-CM

## 2025-01-27 PROCEDURE — 73562 X-RAY EXAM OF KNEE 3: CPT | Mod: TC | Performed by: RADIOLOGY

## 2025-01-27 PROCEDURE — 99214 OFFICE O/P EST MOD 30 MIN: CPT | Performed by: PEDIATRICS

## 2025-01-27 NOTE — PATIENT INSTRUCTIONS
We discussed these other possible diagnosis: Right knee injury, likely flare of underlying arthritis, possible meniscal tear.  We discussed the following treatment options: symptom treatment, activity modification/rest, imaging, rehab and referral. Following discussion, plan: will continue supportive care, close follow up if no improvement    Plan:  - Today's Plan of Care:  Continue with relative rest and activity modification, Ice, Compression, and Elevation.  Can apply ice 10-15 minutes 3-4 times per day as needed. OTC medications as needed.    Home Exercise Program    Discussed bracing options    -We also discussed other future treatment options:  Right knee MRI  Referral to Physical Therapy  Consideration of injections    Follow Up: 4-6 weeks    If you have any further questions for your physician or physician s care team you can call [phone].

## 2025-01-27 NOTE — PROGRESS NOTES
ASSESSMENT & PLAN    Cesar was seen today for pain.    Diagnoses and all orders for this visit:    Right knee injury, initial encounter  -     XR Knee Standing AP [place] Bilat Lat Right; Future    Arthritis of right knee      This issue is acute on chronic and Unchanged.      ICD-10-CM    1. Right knee injury, initial encounter  S89.91XA XR Knee Standing AP [place] Bilat Lat Right      2. Arthritis of right knee  M17.11           We discussed these other possible diagnosis: Right knee injury, likely flare of underlying arthritis, possible meniscal tear.  We discussed the following treatment options: symptom treatment, activity modification/rest, imaging, rehab and referral. Following discussion, plan: will continue supportive care, close follow up if no improvement    Plan:  - Today's Plan of Care:  Continue with relative rest and activity modification, Ice, Compression, and Elevation.  Can apply ice 10-15 minutes 3-4 times per day as needed. OTC medications as needed.    Home Exercise Program    Discussed bracing options    -We also discussed other future treatment options:  Right knee MRI  Referral to Physical Therapy  Consideration of injections    Follow Up: 4-6 weeks    Concerning signs and symptoms were reviewed and all questions were answered at this time.    Ania Carballo MD [facility]  Sports Medicine Physician  [facility]    -----  Chief Complaint   Patient presents with    Right Knee - Pain       SUBJECTIVE  Cesar ROSE Fjerstad is a/an 64 year old male who is seen as a self referral for evaluation of RIGHT knee pain.     The patient is seen by themselves.    Onset: 2 month(s) ago. Patient describes injury as while trying to get into the bobcat while at work.  He slipped and reports a right knee injury, likely hyperextension injury.    Location of Pain: right knee, posterior, medial and lateral  Worsened by: uneven ground, steps, cold, prolonged standing, squatting, crossing the leg over  Better  with: icing, bracing  Treatments tried: ice, casting/splinting/bracing, and topicals/patches   Associated symptoms: swelling and pain     Orthopedic/Surgical history: YES - Date: 2017 [facility] knee scopic surgery by Doreen  Social History/Occupation: inventory, shipping, desk job, some active work     REVIEW OF SYSTEMS:  Review of Systems    OBJECTIVE:  There were no vitals taken for this visit.   General: healthy, alert and in no distress  Skin: no suspicious lesions or rash.  CV: distal perfusion intact   Resp: normal respiratory effort without conversational dyspnea   Psych: normal mood and affect  Gait: NORMAL  Neuro: Normal light sensory exam of lower extremity    Right Knee exam    Inspection:      mild effusion right    Patella:      Normal patellar tracking noted through range of motion right    Tender:      medial joint line right    Non Tender:      remainder of knee area bilateral    Knee ROM:      Full active and passive ROM with flexion and extension right    Hip ROM:     Full active and passive ROM right    Strength:      5/5 with knee extension right    Special Tests:     neg (-) Kaylyn right       neg (-) anterior drawer right       neg (-) posterior drawer right       neg (-) varus at 0 deg and 30 deg right       neg (-) valgus at 0 deg and 30 deg right    Gait:      normal    Neurovascular:      2+ peripheral pulses bilaterally and brisk capillary refill       sensation grossly intact     RADIOLOGY:  Final results and radiologist's interpretation, available in the [facility] health record.  Images were reviewed with the patient in the office today.  My personal interpretation of the performed imaging:     AP and sunrise bilateral and right lateral XR views of knees reviewed: no acute bony abnormality, mild tricompartmental degenerative changes worse in the medial compartment  - will follow official read    Review of the result(s) of each unique test - XR

## 2025-01-27 NOTE — LETTER
1/27/2025      Cesar Foss  [address] [facility] [id number]      Dear Colleague,    Thank you for referring your patient, Cesar Foss, to the [facility]. Please see a copy of my visit note below.    ASSESSMENT & PLAN    Cesar was seen today for pain.    Diagnoses and all orders for this visit:    Right knee injury, initial encounter  -     XR Knee Standing AP [place] Bilat Lat Right; Future    Arthritis of right knee      This issue is acute on chronic and Unchanged.      ICD-10-CM    1. Right knee injury, initial encounter  S89.91XA XR Knee Standing AP [place] Bilat Lat Right      2. Arthritis of right knee  M17.11           We discussed these other possible diagnosis: Right knee injury, likely flare of underlying arthritis, possible meniscal tear.  We discussed the following treatment options: symptom treatment, activity modification/rest, imaging, rehab and referral. Following discussion, plan: will continue supportive care, close follow up if no improvement    Plan:  - Today's Plan of Care:  Continue with relative rest and activity modification, Ice, Compression, and Elevation.  Can apply ice 10-15 minutes 3-4 times per day as needed. OTC medications as needed.    Home Exercise Program    Discussed bracing options    -We also discussed other future treatment options:  Right knee MRI  Referral to Physical Therapy  Consideration of injections    Follow Up: 4-6 weeks    Concerning signs and symptoms were reviewed and all questions were answered at this time.    Ania Carballo MD [facility]  Sports Medicine Physician  [facility]    -----  Chief Complaint   Patient presents with     Right Knee - Pain       SUBJECTIVE  Cesar Foss is a/an 64 year old male who is seen as a self referral for evaluation of RIGHT knee pain.     The patient is seen by themselves.    Onset: 2 month(s) ago. Patient describes injury as while trying to get into the bobcat  while at work.  He slipped and reports a right knee injury, likely hyperextension injury.    Location of Pain: right knee, posterior, medial and lateral  Worsened by: uneven ground, steps, cold, prolonged standing, squatting, crossing the leg over  Better with: icing, bracing  Treatments tried: ice, casting/splinting/bracing, and topicals/patches   Associated symptoms: swelling and pain     Orthopedic/Surgical history: YES - Date: 2017 [facility] knee scopic surgery by Doreen  Social History/Occupation: inventory, shipping, desk job, some active work     REVIEW OF SYSTEMS:  Review of Systems    OBJECTIVE:  There were no vitals taken for this visit.   General: healthy, alert and in no distress  Skin: no suspicious lesions or rash.  CV: distal perfusion intact   Resp: normal respiratory effort without conversational dyspnea   Psych: normal mood and affect  Gait: NORMAL  Neuro: Normal light sensory exam of lower extremity    Right Knee exam    Inspection:      mild effusion right    Patella:      Normal patellar tracking noted through range of motion right    Tender:      medial joint line right    Non Tender:      remainder of knee area bilateral    Knee ROM:      Full active and passive ROM with flexion and extension right    Hip ROM:     Full active and passive ROM right    Strength:      5/5 with knee extension right    Special Tests:     neg (-) Kaylyn right       neg (-) anterior drawer right       neg (-) posterior drawer right       neg (-) varus at 0 deg and 30 deg right       neg (-) valgus at 0 deg and 30 deg right    Gait:      normal    Neurovascular:      2+ peripheral pulses bilaterally and brisk capillary refill       sensation grossly intact     RADIOLOGY:  Final results and radiologist's interpretation, available in the [facility] health record.  Images were reviewed with the patient in the office today.  My personal interpretation of the performed imaging:     AP and sunrise bilateral and right lateral XR  views of knees reviewed: no acute bony abnormality, mild tricompartmental degenerative changes worse in the medial compartment  - will follow official read    Review of the result(s) of each unique test - XR             Again, thank you for allowing me to participate in the care of your patient.        Sincerely,        Ania Carballo MD    Electronically signed

## 2025-03-17 ENCOUNTER — LAB (OUTPATIENT)
Dept: LAB | Facility: CLINIC | Age: 65
End: 2025-03-17
Payer: COMMERCIAL

## 2025-03-17 DIAGNOSIS — Z00.00 PREVENTATIVE HEALTH CARE: ICD-10-CM

## 2025-03-17 DIAGNOSIS — E78.5 HYPERLIPIDEMIA LDL GOAL <130: Chronic | ICD-10-CM

## 2025-03-17 DIAGNOSIS — Z12.5 SCREENING PSA (PROSTATE SPECIFIC ANTIGEN): ICD-10-CM

## 2025-03-17 LAB
ERYTHROCYTE [DISTWIDTH] IN BLOOD BY AUTOMATED COUNT: 13.1 % (ref 10–15)
HCT VFR BLD AUTO: 40.4 % (ref 40–53)
HGB BLD-MCNC: 13.7 G/DL (ref 13.3–17.7)
MCH RBC QN AUTO: 30.2 PG (ref 26.5–33)
MCHC RBC AUTO-ENTMCNC: 33.9 G/DL (ref 31.5–36.5)
MCV RBC AUTO: 89 FL (ref 78–100)
PLATELET # BLD AUTO: 79 10E3/UL (ref 150–450)
RBC # BLD AUTO: 4.54 10E6/UL (ref 4.4–5.9)
WBC # BLD AUTO: 7.5 10E3/UL (ref 4–11)

## 2025-03-17 PROCEDURE — G0103 PSA SCREENING: HCPCS

## 2025-03-17 PROCEDURE — 36415 COLL VENOUS BLD VENIPUNCTURE: CPT

## 2025-03-17 PROCEDURE — 80053 COMPREHEN METABOLIC PANEL: CPT

## 2025-03-17 PROCEDURE — 80061 LIPID PANEL: CPT

## 2025-03-17 PROCEDURE — 85027 COMPLETE CBC AUTOMATED: CPT

## 2025-03-18 LAB
ALBUMIN SERPL BCG-MCNC: 4.3 G/DL (ref 3.5–5.2)
ALP SERPL-CCNC: 42 U/L (ref 40–150)
ALT SERPL W P-5'-P-CCNC: 15 U/L (ref 0–70)
ANION GAP SERPL CALCULATED.3IONS-SCNC: 13 MMOL/L (ref 7–15)
AST SERPL W P-5'-P-CCNC: 27 U/L (ref 0–45)
BILIRUB SERPL-MCNC: 0.4 MG/DL
BUN SERPL-MCNC: 12.9 MG/DL (ref 8–23)
CALCIUM SERPL-MCNC: 10.1 MG/DL (ref 8.8–10.4)
CHLORIDE SERPL-SCNC: 100 MMOL/L (ref 98–107)
CHOLEST SERPL-MCNC: 160 MG/DL
CREAT SERPL-MCNC: 0.9 MG/DL (ref 0.67–1.17)
EGFRCR SERPLBLD CKD-EPI 2021: >90 ML/MIN/1.73M2
FASTING STATUS PATIENT QL REPORTED: YES
FASTING STATUS PATIENT QL REPORTED: YES
GLUCOSE SERPL-MCNC: 96 MG/DL (ref 70–99)
HCO3 SERPL-SCNC: 26 MMOL/L (ref 22–29)
HDLC SERPL-MCNC: 37 MG/DL
LDLC SERPL CALC-MCNC: 100 MG/DL
NONHDLC SERPL-MCNC: 123 MG/DL
POTASSIUM SERPL-SCNC: 4 MMOL/L (ref 3.4–5.3)
PROT SERPL-MCNC: 7.1 G/DL (ref 6.4–8.3)
PSA SERPL DL<=0.01 NG/ML-MCNC: 0.78 NG/ML (ref 0–4.5)
SODIUM SERPL-SCNC: 139 MMOL/L (ref 135–145)
TRIGL SERPL-MCNC: 114 MG/DL

## 2025-03-20 ENCOUNTER — OFFICE VISIT (OUTPATIENT)
Dept: ORTHOPEDICS | Facility: CLINIC | Age: 65
End: 2025-03-20
Payer: COMMERCIAL

## 2025-03-20 DIAGNOSIS — S89.91XD RIGHT KNEE INJURY, SUBSEQUENT ENCOUNTER: Primary | ICD-10-CM

## 2025-03-20 DIAGNOSIS — M17.0 ARTHRITIS OF BOTH KNEES: ICD-10-CM

## 2025-03-20 RX ORDER — LIDOCAINE HYDROCHLORIDE 10 MG/ML
2 INJECTION, SOLUTION INFILTRATION; PERINEURAL
Status: COMPLETED | OUTPATIENT
Start: 2025-03-20 | End: 2025-03-20

## 2025-03-20 RX ORDER — TRIAMCINOLONE ACETONIDE 40 MG/ML
40 INJECTION, SUSPENSION INTRA-ARTICULAR; INTRAMUSCULAR
Status: COMPLETED | OUTPATIENT
Start: 2025-03-20 | End: 2025-03-20

## 2025-03-20 RX ADMIN — LIDOCAINE HYDROCHLORIDE 2 ML: 10 INJECTION, SOLUTION INFILTRATION; PERINEURAL at 11:43

## 2025-03-20 RX ADMIN — TRIAMCINOLONE ACETONIDE 40 MG: 40 INJECTION, SUSPENSION INTRA-ARTICULAR; INTRAMUSCULAR at 11:43

## 2025-03-20 NOTE — LETTER
3/20/2025      Cesar Foss  [address] [facility] [id number]      Dear Colleague,    Thank you for referring your patient, Cesar Foss, to the [facility]. Please see a copy of my visit note below.    ASSESSMENT & PLAN    Cesar was seen today for pain and follow up.    Diagnoses and all orders for this visit:    Right knee injury, subsequent encounter    Arthritis of both knees    Other orders  -     Large Joint Injection/Arthocentesis: bilateral knee      This issue is acute on chronic and Unchanged.      ICD-10-CM    1. Right knee injury, subsequent encounter  S89.91XD       2. Arthritis of both knees  M17.0         Patient Instructions   Discussed nature of degenerative arthrosis of the knee. Discussed symptom treatment with over-the-counter medications, ice or heat, topical treatments, and rest if needed. Discussed use of sleeve or wrap for comfort. Discussed benefits of exercise and physical therapy. Discussed injection therapy. Also briefly discussed future consideration of referral to orthopedic surgery for further evaluation and discussion of arthroplasty.    Plan:  - Today's Plan of Care:  Steroid injection of the bilateral knees was performed today in clinic  Icing for the next 1-2 days may be helpful for pain. Injection may take 10-14 days to see the full effect.    Discussed activity considerations and other supportive care including Ice/Heat, OTC and other topical medications as needed.    Home Exercise Program    -We also discussed other future treatment options:  Referral to Physical Therapy  Consideration of Hyaluronic Acid Injection (call first, requires insurance approval)  Referral to Orthopedic Surgery    Follow Up: as needed    Concerning signs and symptoms were reviewed and all questions were answered at this time.    Ania Carballo MD [facility]  Sports Medicine Physician  [facility]    SUBJECTIVE- Interim History March 20,  "2025    Chief Complaint   Patient presents with     Right Knee - Pain, Follow Up       Cesar ROSE Fjerstad is a 65 year old male who is seen in f/u up for    Right knee injury, subsequent encounter  Arthritis of both knees.  Since last visit on 1/27/25, Cesar has not persistent symptoms. His left knee is hurting as well. He states his right knee hurts a lot when he kneels on it, it feels like there is a \"tack\" in there when he does this.  He is looking for next steps, interested in injections.    Onset: 5 month(s) ago. Patient describes injury as while trying to get into the bobcat. He slipped and reports a right knee injury, likely hyperextension injury. Flared up right knee.     Location of Pain: right knee, posterior, medial and lateral  Worsened by: uneven ground, steps, cold, prolonged standing, squatting, crossing the leg over, descending stairs, loading of the knee, kneeling   Better with: icing, bracing  Treatments tried: ice, casting/splinting/bracing, and topicals/patches, previous imaging (xray 1/27/25)  Associated symptoms: swelling and pain      Orthopedic/Surgical history: YES - Date: 2017 TALON knee scopic surgery by Doreen bilaterally  Social History/Occupation: inventory, shipping, desk job, some active work       REVIEW OF SYSTEMS:  Review of Systems    OBJECTIVE:  There were no vitals taken for this visit.   General: healthy, alert and in no distress  Skin: no suspicious lesions or rash.  CV: distal perfusion intact   Resp: normal respiratory effort without conversational dyspnea   Psych: normal mood and affect  Gait: NORMAL  Neuro: Normal light sensory exam of lower extremity     Bilateral Knee exam  Inspection:      mild effusion right     Patella:      Normal patellar tracking noted through range of motion right  - crepitus noted in bilateral PT joints     Tender:      medial joint line bilaterally     Non Tender:      remainder of knee area bilateral     Knee ROM:      Slightly decreased full ROM " right knee     Hip ROM:     no pain with hip ROM     Strength:      5-/5 with knee extension right     Special Tests:     neg (-) Kaylyn right       neg (-) anterior drawer right       neg (-) posterior drawer right       neg (-) varus at 0 deg and 30 deg right       neg (-) valgus at 0 deg and 30 deg right     Gait:      normal     Neurovascular:      2+ peripheral pulses bilaterally and brisk capillary refill       sensation grossly intact     RADIOLOGY:  Final results and radiologist's interpretation, available in the [facility] health record.  Images were reviewed with the patient in the office today.  My personal interpretation of the performed imaging:     AP and sunrise bilateral and right lateral XR views of knees reviewed 1/27/2025: no acute bony abnormality, mild tricompartmental degenerative changes worse in the medial compartment on the left    Results for orders placed or performed in visit on 01/27/25   XR Knee Standing AP [place] Bilat Lat Right    Narrative    EXAM: XR KNEE STANDING AP SUNRISE BILAT LAT RIGHT  LOCATION: [facility]  DATE: 1/27/2025    INDICATION: Right knee injury, initial encounter.    COMPARISON: None.      Impression    IMPRESSION:   RIGHT KNEE: Mild patellofemoral osteoarthrosis. No fracture, effusion or calcified intra-articular body.    LEFT KNEE: Moderate medial compartment osteoarthrosis. Mild patellofemoral osteoarthrosis.         Review of the result(s) of each unique test - XR         Large Joint Injection/Arthocentesis: bilateral knee    Date/Time: 3/20/2025 11:43 AM    Performed by: Ania Carballo MD  Authorized by: Ania Carballo MD    Indications:  Pain  Needle Size:  25 G  Guidance: landmark guided    Approach:  Anterolateral  Location:  Knee  Laterality:  Bilateral      Medications (Right):  2 mL lidocaine 1 %; 40 mg triamcinolone 40 MG/ML  Medications (Left):  2 mL lidocaine 1 %; 40 mg triamcinolone 40 MG/ML  Outcome:  Tolerated well,  no immediate complications  Procedure discussed: discussed risks, benefits, and alternatives    Consent Given by:  Patient  Timeout: timeout called immediately prior to procedure    Prep: patient was prepped and draped in usual sterile fashion     The risks, benefits and complications of steroid injection were discussed with the patient (including but not limited to: bleeding, infection, pain, scar, damage to adjacent structures, atrophy or necrosis of soft tissue, skin blanching, failure to relieve symptoms, worsening of symptoms, allergic reaction). After this discussion all questions were addressed and answered and the patient elected to proceed. The patient tolerated the procedure well without complications.  Also discussed that if diabetic, recommend close monitoring of blood sugars over the next week as cortisone injections can temporarily elevate blood sugars.        Again, thank you for allowing me to participate in the care of your patient.        Sincerely,        Ania Carballo MD    Electronically signed

## 2025-03-20 NOTE — PATIENT INSTRUCTIONS
Discussed nature of degenerative arthrosis of the knee. Discussed symptom treatment with over-the-counter medications, ice or heat, topical treatments, and rest if needed. Discussed use of sleeve or wrap for comfort. Discussed benefits of exercise and physical therapy. Discussed injection therapy. Also briefly discussed future consideration of referral to orthopedic surgery for further evaluation and discussion of arthroplasty.    Plan:  - Today's Plan of Care:  Steroid injection of the bilateral knees was performed today in clinic  Icing for the next 1-2 days may be helpful for pain. Injection may take 10-14 days to see the full effect.    Discussed activity considerations and other supportive care including Ice/Heat, OTC and other topical medications as needed.    Home Exercise Program    -We also discussed other future treatment options:  Referral to Physical Therapy  Consideration of Hyaluronic Acid Injection (call first, requires insurance approval)  Referral to Orthopedic Surgery    Follow Up: as needed    If you have any further questions for your physician or physician s care team you can call [phone] and use option 3 to leave a voice message.    After the Injection     After the injection, strenuous and repetitive activity should be minimized for approximately 48 hours.   Ice should be applied to the injected area at least for the next 48 hours.   Apply ice to the injected area at least 3 - 4 times a day for 20 minutes each time for the next 48 hours. This can reduce the painful  flare  reaction that can follow an injection the next day. This reaction can cause the area that was injected to hurt more the next day just from the injection. This will resolve within a day if it does occur.     Use over-the-counter pain medications such as Tylenol to help with the pain if necessary.     After 48 hours, icing the area may be continued if you find it beneficial.     The lidocaine or marcaine (commonly called  Novocain) is an anesthetic agent that is injected with the steroid will typically relieve your pain for a few hours following the injection. If the  Novocain  and steroid are injected near a nerve, you may experience local numbness or weakness from the nerve block until it wears off. After this wears off your pain may return until the steroid takes effect.   The steroid may be effective immediately after the injection. Do not be concerned if the injection is not effective in relieving your symptoms immediately. In some cases, it may take up to two weeks for the steroid to work.   If you are diabetic, the corticosteroid may cause your blood sugar to become elevated for several days following the injection. This response usually lasts about 2-4 days before it returns to your normal level.   You should report any adverse reaction to you doctor. Call if there are any questions.

## 2025-03-20 NOTE — PROGRESS NOTES
ASSESSMENT & PLAN    Cesar was seen today for pain and follow up.    Diagnoses and all orders for this visit:    Right knee injury, subsequent encounter    Arthritis of both knees    Other orders  -     Large Joint Injection/Arthocentesis: bilateral knee      This issue is acute on chronic and Unchanged.      ICD-10-CM    1. Right knee injury, subsequent encounter  S89.91XD       2. Arthritis of both knees  M17.0         Patient Instructions   Discussed nature of degenerative arthrosis of the knee. Discussed symptom treatment with over-the-counter medications, ice or heat, topical treatments, and rest if needed. Discussed use of sleeve or wrap for comfort. Discussed benefits of exercise and physical therapy. Discussed injection therapy. Also briefly discussed future consideration of referral to orthopedic surgery for further evaluation and discussion of arthroplasty.    Plan:  - Today's Plan of Care:  Steroid injection of the bilateral knees was performed today in clinic  Icing for the next 1-2 days may be helpful for pain. Injection may take 10-14 days to see the full effect.    Discussed activity considerations and other supportive care including Ice/Heat, OTC and other topical medications as needed.    Home Exercise Program    -We also discussed other future treatment options:  Referral to Physical Therapy  Consideration of Hyaluronic Acid Injection (call first, requires insurance approval)  Referral to Orthopedic Surgery    Follow Up: as needed    Concerning signs and symptoms were reviewed and all questions were answered at this time.    Ania Carballo MD [facility]  Sports Medicine Physician  [facility]    SUBJECTIVE- Interim History March 20, 2025    Chief Complaint   Patient presents with    Right Knee - Pain, Follow Up       Cesar ROSE Fjerstad is a 65 year old male who is seen in f/u up for    Right knee injury, subsequent encounter  Arthritis of both knees.  Since last visit on 1/27/25, Cesar has  "not persistent symptoms. His left knee is hurting as well. He states his right knee hurts a lot when he kneels on it, it feels like there is a \"tack\" in there when he does this.  He is looking for next steps, interested in injections.    Onset: 5 month(s) ago. Patient describes injury as while trying to get into the bobcat. He slipped and reports a right knee injury, likely hyperextension injury. Flared up right knee.     Location of Pain: right knee, posterior, medial and lateral  Worsened by: uneven ground, steps, cold, prolonged standing, squatting, crossing the leg over, descending stairs, loading of the knee, kneeling   Better with: icing, bracing  Treatments tried: ice, casting/splinting/bracing, and topicals/patches, previous imaging (xray 1/27/25)  Associated symptoms: swelling and pain      Orthopedic/Surgical history: YES - Date: 2017 TALON knee scopic surgery by Doreen bilaterally  Social History/Occupation: inventory, shipping, desk job, some active work       REVIEW OF SYSTEMS:  Review of Systems    OBJECTIVE:  There were no vitals taken for this visit.   General: healthy, alert and in no distress  Skin: no suspicious lesions or rash.  CV: distal perfusion intact   Resp: normal respiratory effort without conversational dyspnea   Psych: normal mood and affect  Gait: NORMAL  Neuro: Normal light sensory exam of lower extremity     Bilateral Knee exam  Inspection:      mild effusion right     Patella:      Normal patellar tracking noted through range of motion right  - crepitus noted in bilateral PT joints     Tender:      medial joint line bilaterally     Non Tender:      remainder of knee area bilateral     Knee ROM:      Slightly decreased full ROM right knee     Hip ROM:     no pain with hip ROM     Strength:      5-/5 with knee extension right     Special Tests:     neg (-) Kaylyn right       neg (-) anterior drawer right       neg (-) posterior drawer right       neg (-) varus at 0 deg and 30 deg " right       neg (-) valgus at 0 deg and 30 deg right     Gait:      normal     Neurovascular:      2+ peripheral pulses bilaterally and brisk capillary refill       sensation grossly intact     RADIOLOGY:  Final results and radiologist's interpretation, available in the [facility] health record.  Images were reviewed with the patient in the office today.  My personal interpretation of the performed imaging:     AP and sunrise bilateral and right lateral XR views of knees reviewed 1/27/2025: no acute bony abnormality, mild tricompartmental degenerative changes worse in the medial compartment on the left    Results for orders placed or performed in visit on 01/27/25   XR Knee Standing AP [place] Bilat Lat Right    Narrative    EXAM: XR KNEE STANDING AP [place] BILAT LAT RIGHT  LOCATION: [facility]  DATE: 1/27/2025    INDICATION: Right knee injury, initial encounter.    COMPARISON: None.      Impression    IMPRESSION:   RIGHT KNEE: Mild patellofemoral osteoarthrosis. No fracture, effusion or calcified intra-articular body.    LEFT KNEE: Moderate medial compartment osteoarthrosis. Mild patellofemoral osteoarthrosis.         Review of the result(s) of each unique test - XR         Large Joint Injection/Arthocentesis: bilateral knee    Date/Time: 3/20/2025 11:43 AM    Performed by: Ania Carballo MD  Authorized by: Ania Carballo MD    Indications:  Pain  Needle Size:  25 G  Guidance: landmark guided    Approach:  Anterolateral  Location:  Knee  Laterality:  Bilateral      Medications (Right):  2 mL lidocaine 1 %; 40 mg triamcinolone 40 MG/ML  Medications (Left):  2 mL lidocaine 1 %; 40 mg triamcinolone 40 MG/ML  Outcome:  Tolerated well, no immediate complications  Procedure discussed: discussed risks, benefits, and alternatives    Consent Given by:  Patient  Timeout: timeout called immediately prior to procedure    Prep: patient was prepped and draped in usual sterile fashion     The risks,  benefits and complications of steroid injection were discussed with the patient (including but not limited to: bleeding, infection, pain, scar, damage to adjacent structures, atrophy or necrosis of soft tissue, skin blanching, failure to relieve symptoms, worsening of symptoms, allergic reaction). After this discussion all questions were addressed and answered and the patient elected to proceed. The patient tolerated the procedure well without complications.  Also discussed that if diabetic, recommend close monitoring of blood sugars over the next week as cortisone injections can temporarily elevate blood sugars.

## 2025-03-26 SDOH — HEALTH STABILITY: PHYSICAL HEALTH: ON AVERAGE, HOW MANY DAYS PER WEEK DO YOU ENGAGE IN MODERATE TO STRENUOUS EXERCISE (LIKE A BRISK WALK)?: 6 DAYS

## 2025-03-26 SDOH — HEALTH STABILITY: PHYSICAL HEALTH: ON AVERAGE, HOW MANY MINUTES DO YOU ENGAGE IN EXERCISE AT THIS LEVEL?: 90 MIN

## 2025-03-26 ASSESSMENT — ASTHMA QUESTIONNAIRES
QUESTION_4 LAST FOUR WEEKS HOW OFTEN HAVE YOU USED YOUR RESCUE INHALER OR NEBULIZER MEDICATION (SUCH AS ALBUTEROL): ONCE A WEEK OR LESS
QUESTION_2 LAST FOUR WEEKS HOW OFTEN HAVE YOU HAD SHORTNESS OF BREATH: ONCE OR TWICE A WEEK
QUESTION_5 LAST FOUR WEEKS HOW WOULD YOU RATE YOUR ASTHMA CONTROL: WELL CONTROLLED
QUESTION_1 LAST FOUR WEEKS HOW MUCH OF THE TIME DID YOUR ASTHMA KEEP YOU FROM GETTING AS MUCH DONE AT WORK, SCHOOL OR AT HOME: NONE OF THE TIME
QUESTION_3 LAST FOUR WEEKS HOW OFTEN DID YOUR ASTHMA SYMPTOMS (WHEEZING, COUGHING, SHORTNESS OF BREATH, CHEST TIGHTNESS OR PAIN) WAKE YOU UP AT NIGHT OR EARLIER THAN USUAL IN THE MORNING: NOT AT ALL
ACT_TOTALSCORE: 22

## 2025-03-26 ASSESSMENT — SOCIAL DETERMINANTS OF HEALTH (SDOH): HOW OFTEN DO YOU GET TOGETHER WITH FRIENDS OR RELATIVES?: THREE TIMES A WEEK

## 2025-03-27 DIAGNOSIS — I10 ESSENTIAL HYPERTENSION WITH GOAL BLOOD PRESSURE LESS THAN 140/90: ICD-10-CM

## 2025-03-27 RX ORDER — AMLODIPINE BESYLATE 10 MG/1
TABLET ORAL
Qty: 30 TABLET | Refills: 0 | Status: SHIPPED | OUTPATIENT
Start: 2025-03-27

## 2025-03-31 ENCOUNTER — OFFICE VISIT (OUTPATIENT)
Dept: FAMILY MEDICINE | Facility: CLINIC | Age: 65
End: 2025-03-31
Payer: COMMERCIAL

## 2025-03-31 VITALS
WEIGHT: 185 LBS | RESPIRATION RATE: 18 BRPM | HEART RATE: 102 BPM | OXYGEN SATURATION: 98 % | BODY MASS INDEX: 32.78 KG/M2 | HEIGHT: 63 IN | SYSTOLIC BLOOD PRESSURE: 110 MMHG | TEMPERATURE: 98.4 F | DIASTOLIC BLOOD PRESSURE: 65 MMHG

## 2025-03-31 DIAGNOSIS — Z00.00 ROUTINE GENERAL MEDICAL EXAMINATION AT A HEALTH CARE FACILITY: Primary | ICD-10-CM

## 2025-03-31 DIAGNOSIS — E66.01 MORBID OBESITY (H): ICD-10-CM

## 2025-03-31 DIAGNOSIS — E78.5 HYPERLIPIDEMIA LDL GOAL <130: ICD-10-CM

## 2025-03-31 DIAGNOSIS — D69.6 THROMBOCYTOPENIA: Chronic | ICD-10-CM

## 2025-03-31 DIAGNOSIS — I10 ESSENTIAL HYPERTENSION WITH GOAL BLOOD PRESSURE LESS THAN 140/90: ICD-10-CM

## 2025-03-31 DIAGNOSIS — K21.00 GASTROESOPHAGEAL REFLUX DISEASE WITH ESOPHAGITIS, UNSPECIFIED WHETHER HEMORRHAGE: ICD-10-CM

## 2025-03-31 DIAGNOSIS — R53.83 OTHER FATIGUE: ICD-10-CM

## 2025-03-31 PROCEDURE — 1126F AMNT PAIN NOTED NONE PRSNT: CPT | Performed by: FAMILY MEDICINE

## 2025-03-31 PROCEDURE — 99213 OFFICE O/P EST LOW 20 MIN: CPT | Mod: 25 | Performed by: FAMILY MEDICINE

## 2025-03-31 PROCEDURE — 3078F DIAST BP <80 MM HG: CPT | Performed by: FAMILY MEDICINE

## 2025-03-31 PROCEDURE — 3074F SYST BP LT 130 MM HG: CPT | Performed by: FAMILY MEDICINE

## 2025-03-31 PROCEDURE — 99396 PREV VISIT EST AGE 40-64: CPT | Performed by: FAMILY MEDICINE

## 2025-03-31 RX ORDER — LISINOPRIL AND HYDROCHLOROTHIAZIDE 20; 25 MG/1; MG/1
1 TABLET ORAL DAILY
Qty: 90 TABLET | Refills: 3 | Status: SHIPPED | OUTPATIENT
Start: 2025-03-31

## 2025-03-31 RX ORDER — SIMVASTATIN 10 MG
10 TABLET ORAL AT BEDTIME
Qty: 90 TABLET | Refills: 3 | Status: SHIPPED | OUTPATIENT
Start: 2025-03-31

## 2025-03-31 RX ORDER — OMEPRAZOLE 20 MG/1
20 CAPSULE, DELAYED RELEASE ORAL DAILY
Qty: 90 CAPSULE | Refills: 3 | Status: SHIPPED | OUTPATIENT
Start: 2025-03-31

## 2025-03-31 RX ORDER — AMLODIPINE BESYLATE 10 MG/1
TABLET ORAL
Qty: 90 TABLET | Refills: 3 | Status: SHIPPED | OUTPATIENT
Start: 2025-03-31

## 2025-03-31 ASSESSMENT — PAIN SCALES - GENERAL: PAINLEVEL_OUTOF10: NO PAIN (0)

## 2025-03-31 NOTE — PATIENT INSTRUCTIONS
Patient Education   Preventive Care Advice   This is general advice given by our system to help you stay healthy. However, your care team may have specific advice just for you. Please talk to your care team about your preventive care needs.  Nutrition  Eat 5 or more servings of fruits and vegetables each day.  Try wheat bread, brown rice and whole grain pasta (instead of white bread, rice, and pasta).  Get enough calcium and vitamin D. Check the label on foods and aim for 100% of the RDA (recommended daily allowance).  Lifestyle  Exercise at least 150 minutes each week  (30 minutes a day, 5 days a week).  Do muscle strengthening activities 2 days a week. These help control your weight and prevent disease.  No smoking.  Wear sunscreen to prevent skin cancer.  Have a dental exam and cleaning every 6 months.  Yearly exams  See your health care team every year to talk about:  Any changes in your health.  Any medicines your care team has prescribed.  Preventive care, family planning, and ways to prevent chronic diseases.  Shots (vaccines)   HPV shots (up to age 26), if you've never had them before.  Hepatitis B shots (up to age 59), if you've never had them before.  COVID-19 shot: Get this shot when it's due.  Flu shot: Get a flu shot every year.  Tetanus shot: Get a tetanus shot every 10 years.  Pneumococcal, hepatitis A, and RSV shots: Ask your care team if you need these based on your risk.  Shingles shot (for age 50 and up)  General health tests  Diabetes screening:  Starting at age 35, Get screened for diabetes at least every 3 years.  If you are younger than age 35, ask your care team if you should be screened for diabetes.  Cholesterol test: At age 39, start having a cholesterol test every 5 years, or more often if advised.  Bone density scan (DEXA): At age 50, ask your care team if you should have this scan for osteoporosis (brittle bones).  Hepatitis C: Get tested at least once in your life.  STIs (sexually  transmitted infections)  Before age 24: Ask your care team if you should be screened for STIs.  After age 24: Get screened for STIs if you're at risk. You are at risk for STIs (including HIV) if:  You are sexually active with more than one person.  You don't use condoms every time.  You or a partner was diagnosed with a sexually transmitted infection.  If you are at risk for HIV, ask about PrEP medicine to prevent HIV.  Get tested for HIV at least once in your life, whether you are at risk for HIV or not.  Cancer screening tests  Cervical cancer screening: If you have a cervix, begin getting regular cervical cancer screening tests starting at age 21.  Breast cancer scan (mammogram): If you've ever had breasts, begin having regular mammograms starting at age 40. This is a scan to check for breast cancer.  Colon cancer screening: It is important to start screening for colon cancer at age 45.  Have a colonoscopy test every 10 years (or more often if you're at risk) Or, ask your provider about stool tests like a FIT test every year or Cologuard test every 3 years.  To learn more about your testing options, visit:   .  For help making a decision, visit:   https://bit.ly/ra68666.  Prostate cancer screening test: If you have a prostate, ask your care team if a prostate cancer screening test (PSA) at age 55 is right for you.  Lung cancer screening: If you are a current or former smoker ages 50 to 80, ask your care team if ongoing lung cancer screenings are right for you.  For informational purposes only. Not to replace the advice of your health care provider. Copyright   2023 [employer] All rights reserved. Clinically reviewed by the [employer] Transitions Program. [employer] 420134 - REV 01/24.

## 2025-03-31 NOTE — PROGRESS NOTES
"Preventive Care Visit  [facility]  Martin Howe MD, Family Medicine  Mar 31, 2025      Assessment & Plan     ASSESSMENT / PLAN:  (Z00.00) Routine general medical examination at a health care facility  (primary encounter diagnosis)  Comment: generally healthy and normal exam/labs  Plan: Reviewed self mole/testicle check handout.  vitmainD. exercise    (E78.5) Hyperlipidemia LDL goal <130  Comment: stalbe  Plan: simvastatin (ZOCOR) 10 MG tablet        Chest pain or shortness of breath to er    (K21.00) Gastroesophageal reflux disease with esophagitis, unspecified whether hemorrhage  Comment: stable  Plan: omeprazole (PRILOSEC) 20 MG DR capsule        Repeat EGD if worse. Continue avoid ALCOHOL     (I10) Essential hypertension with goal blood pressure less than 140/90  Comment: stable  Plan: lisinopril-hydrochlorothiazide (ZESTORETIC)         20-25 MG tablet, amLODIPine (NORVASC) 10 MG         tablet        Self-monitor/exerise.     (R53.83) Other fatigue  Comment: stable  Plan: continue wellbutrin/exercise    (E66.01) Morbid obesity (H)  Comment: resolved  Plan: continue diet/exercise    (D69.6) Thrombocytopenia  Comment: stable and no bleeding  Plan: continue avoid ALCOHOL and limit nsaids. Back to hematology if a lot worse. To ER if active bleeding issue. Call/email with questions/concerns        Patient has been advised of split billing requirements and indicates understanding: Yes        BMI  Estimated body mass index is 32.51 kg/m  as calculated from the following:    Height as of this encounter: 1.607 m (5' 3.25\").    Weight as of this encounter: 83.9 kg (185 lb).   Weight management plan: Discussed healthy diet and exercise guidelines    Counseling  Appropriate preventive services were addressed with this patient via screening, questionnaire, or discussion as appropriate for fall prevention, nutrition, physical activity, Tobacco-use cessation, social engagement, weight loss and " cognition.  Checklist reviewing preventive services available has been given to the patient.  Reviewed patient's diet, addressing concerns and/or questions.           Subjective   Cesar is a 65 year old, presenting for the following:  Physical     History htn, gerd/barrets, high cholesterol, hyperglycemia, fatty liver, obesity, low platelets (seen hematology) and asthma    Rare nsaid. No asa. Occasionally ALCOHOL.   No black/bloody stools. Some blood noses in past. No hematuria.  No bruising.    Chronic low back pain.  Dating 24 years.   Daughter 38yo and 7 grandkid   Emotionally doing ok. No chest pain or shortness of breath. No nausea, vomiting or diarrhea or abdominal pain pain. No urine changes or hematuria. No std concerns. No testicle  pain/masses/hernia.   Gerd stable.   Cpap for ventura - helpful.   No mole changes or rashes, asthma stable.   No MDI needed. Outside blood pressure readings.  No ALCOHOL in 6 months.  No asa. Some nsaids.     HPI           Advance Care Planning  Patient does not have a Health Care Directive: Discussed advance care planning with patient; however, patient declined at this time.      3/26/2025   General Health   How would you rate your overall physical health? Good   Feel stress (tense, anxious, or unable to sleep) Only a little   (!) STRESS CONCERN      3/26/2025   Nutrition   Three or more servings of calcium each day? Yes   Diet: Regular (no restrictions)   How many servings of fruit and vegetables per day? (!) 0-1   How many sweetened beverages each day? (!) 2         3/26/2025   Exercise   Days per week of moderate/strenous exercise 6 days   Average minutes spent exercising at this level 90 min         3/26/2025   Social Factors   Frequency of gathering with friends or relatives Three times a week   Worry food won't last until get money to buy more No   Food not last or not have enough money for food? No   Do you have housing? (Housing is defined as stable permanent housing and  does not include staying ouside in a car, in a tent, in an abandoned building, in an overnight shelter, or couch-surfing.) Yes   Are you worried about losing your housing? No   Lack of transportation? No   Unable to get utilities (heat,electricity)? No         3/26/2025   Fall Risk   Fallen 2 or more times in the past year? No   Trouble with walking or balance? No          3/26/2025   Dental   Dentist two times every year? Yes           3/12/2024   TB Screening   Were you born outside of the [place]? (!) YES             Today's PHQ-2 Score:       3/31/2025     4:57 AM   PHQ-2 ( 1999 [employer])   Q1: Little interest or pleasure in doing things 0   Q2: Feeling down, depressed or hopeless 0   PHQ-2 Score 0    Q1: Little interest or pleasure in doing things Not at all   Q2: Feeling down, depressed or hopeless Not at all   PHQ-2 Score 0       Patient-reported           3/26/2025   Substance Use   Alcohol more than 3/day or more than 7/wk No   Do you use any other substances recreationally? No     Social History     Tobacco Use    Smoking status: Never    Smokeless tobacco: Never    Tobacco comments:     non smoking household   Vaping Use    Vaping status: Never Used   Substance Use Topics    Alcohol use: Yes     Comment: 2-3/night    Drug use: No             3/26/2025   One time HIV Screening   Previous HIV test? I don't know         3/26/2025   STI Screening   New sexual partner(s) since last STI/HIV test? No   Last PSA:   PSA   Date Value Ref Range Status   02/10/2021 0.86 0 - 4 ug/L Final     Comment:     Assay Method:  Chemiluminescence using Siemens Vista analyzer     Prostate Specific Antigen Screen   Date Value Ref Range Status   03/17/2025 0.78 0.00 - 4.50 ng/mL Final   05/08/2023 0.92 0.00 - 4.00 ug/L Final     ASCVD Risk   The 10-year ASCVD risk score (Don MCMILLAN, et al., 2019) is: 10%    Values used to calculate the score:      Age: 65 years      Sex: Male      Is Non- : No       "Diabetic: No      Tobacco smoker: No      Systolic Blood Pressure: 110 mmHg      Is BP treated: Yes      HDL Cholesterol: 37 mg/dL      Total Cholesterol: 160 mg/dL           Reviewed and updated as needed this visit by Provider                             Objective    Exam  /65   Pulse 102   Temp 98.4  F (36.9  C) (Tympanic)   Resp 18   Ht 1.607 m (5' 3.25\")   Wt 83.9 kg (185 lb)   SpO2 98%   BMI 32.51 kg/m     Estimated body mass index is 32.51 kg/m  as calculated from the following:    Height as of this encounter: 1.607 m (5' 3.25\").    Weight as of this encounter: 83.9 kg (185 lb).    Physical Exam  GENERAL: alert and no distress  EYES: Eyes grossly normal to inspection, PERRL and conjunctivae and sclerae normal  HENT: ear canals and TM's normal, nose and mouth without ulcers or lesions  NECK: no adenopathy, no asymmetry, masses, or scars  RESP: lungs clear to auscultation - no rales, rhonchi or wheezes  BREAST: normal without masses, tenderness or nipple discharge and no palpable axillary masses or adenopathy  CV: regular rate and rhythm, normal S1 S2, no S3 or S4, no murmur, click or rub, no peripheral edema   ABDOMEN: soft, nontender, no hepatosplenomegaly, no masses and bowel sounds normal   (male): patient deferred /rectal exams  MS: no gross musculoskeletal defects noted, no edema  SKIN: no suspicious lesions or rashes  NEURO: Normal strength and tone, mentation intact and speech normal  PSYCH: mentation appears normal, affect normal/bright  LYMPH: no cervical, supraclavicular, axillary adenopathy        Signed Electronically by: Martin Howe MD    "

## 2025-04-09 PROBLEM — G06.2 EPIDURAL ABSCESS: Status: ACTIVE | Noted: 2023-04-21

## 2025-04-18 PROBLEM — G06.2 EPIDURAL ABSCESS: Status: RESOLVED | Noted: 2023-04-21 | Resolved: 2025-04-18

## 2025-04-18 PROBLEM — M75.112 NONTRAUMATIC INCOMPLETE TEAR OF LEFT ROTATOR CUFF: Status: RESOLVED | Noted: 2024-05-13 | Resolved: 2025-04-18

## 2025-04-18 PROBLEM — S83.242D OTHER TEAR OF MEDIAL MENISCUS OF LEFT KNEE AS CURRENT INJURY, SUBSEQUENT ENCOUNTER: Status: RESOLVED | Noted: 2017-01-17 | Resolved: 2025-04-18

## 2025-04-18 ASSESSMENT — SLEEP AND FATIGUE QUESTIONNAIRES
HOW LIKELY ARE YOU TO NOD OFF OR FALL ASLEEP WHILE LYING DOWN TO REST IN THE AFTERNOON WHEN CIRCUMSTANCES PERMIT: MODERATE CHANCE OF DOZING
HOW LIKELY ARE YOU TO NOD OFF OR FALL ASLEEP WHEN YOU ARE A PASSENGER IN A CAR FOR AN HOUR WITHOUT A BREAK: SLIGHT CHANCE OF DOZING
HOW LIKELY ARE YOU TO NOD OFF OR FALL ASLEEP WHILE SITTING AND TALKING TO SOMEONE: SLIGHT CHANCE OF DOZING
HOW LIKELY ARE YOU TO NOD OFF OR FALL ASLEEP WHILE SITTING INACTIVE IN A PUBLIC PLACE: SLIGHT CHANCE OF DOZING
HOW LIKELY ARE YOU TO NOD OFF OR FALL ASLEEP IN A CAR, WHILE STOPPED FOR A FEW MINUTES IN TRAFFIC: WOULD NEVER DOZE
HOW LIKELY ARE YOU TO NOD OFF OR FALL ASLEEP WHILE WATCHING TV: MODERATE CHANCE OF DOZING
HOW LIKELY ARE YOU TO NOD OFF OR FALL ASLEEP WHILE SITTING QUIETLY AFTER LUNCH WITHOUT ALCOHOL: SLIGHT CHANCE OF DOZING
HOW LIKELY ARE YOU TO NOD OFF OR FALL ASLEEP WHILE SITTING AND READING: SLIGHT CHANCE OF DOZING

## 2025-04-21 ENCOUNTER — VIRTUAL VISIT (OUTPATIENT)
Dept: SLEEP MEDICINE | Facility: CLINIC | Age: 65
End: 2025-04-21
Payer: COMMERCIAL

## 2025-04-21 VITALS
HEIGHT: 63 IN | WEIGHT: 182 LBS | SYSTOLIC BLOOD PRESSURE: 110 MMHG | DIASTOLIC BLOOD PRESSURE: 65 MMHG | BODY MASS INDEX: 32.25 KG/M2

## 2025-04-21 DIAGNOSIS — E66.09 CLASS 1 OBESITY DUE TO EXCESS CALORIES WITH SERIOUS COMORBIDITY AND BODY MASS INDEX (BMI) OF 32.0 TO 32.9 IN ADULT: ICD-10-CM

## 2025-04-21 DIAGNOSIS — E66.811 CLASS 1 OBESITY DUE TO EXCESS CALORIES WITH SERIOUS COMORBIDITY AND BODY MASS INDEX (BMI) OF 32.0 TO 32.9 IN ADULT: ICD-10-CM

## 2025-04-21 DIAGNOSIS — G47.33 OSA (OBSTRUCTIVE SLEEP APNEA): Primary | Chronic | ICD-10-CM

## 2025-04-21 PROCEDURE — 3074F SYST BP LT 130 MM HG: CPT | Performed by: INTERNAL MEDICINE

## 2025-04-21 PROCEDURE — 1126F AMNT PAIN NOTED NONE PRSNT: CPT | Performed by: INTERNAL MEDICINE

## 2025-04-21 PROCEDURE — 98000 SYNCH AUDIO-VIDEO NEW SF 15: CPT | Performed by: INTERNAL MEDICINE

## 2025-04-21 PROCEDURE — 3078F DIAST BP <80 MM HG: CPT | Performed by: INTERNAL MEDICINE

## 2025-04-21 ASSESSMENT — PAIN SCALES - GENERAL: PAINLEVEL_OUTOF10: NO PAIN (0)

## 2025-04-21 NOTE — PATIENT INSTRUCTIONS

## 2025-04-21 NOTE — PROGRESS NOTES
"Sleep Apnea - Follow-up Visit:    Impression/Plan:     Severe Sleep apnea.   Tolerating PAP well. Benefiting from treatment.   Sub-optimal adherence per download because he uses his old device   - Continue current treatments.   - He wasn't to consider buying a second device for cabin OOP (non-travel)    Advanced sleep phase   Well compensated     Obesity  Weight down 15-20# since sleep study 2005   - See patient instructions      Cesar Foss will follow up in about 2 year(s).     I spent 5 minutes with patient including counseling, and 10 minutes with chart review, and documentation         History of Present Illness:  Chief Complaint   Patient presents with    RECHECK       Cesar Foss presents for follow-up of their severe sleep apnea, managed with CPAP.     [facility], '[employer]', wants to switch [facility]    Patient was initially seen at [facility] with loud snoring loudly, awakening with gasping and choking and shortness of breath, un-refreshing sleep (ESS 12).     Sleep study [place] 3/27/05 (200#, BMI 34.9)  - AHI 97, , low O2 80%, only supine position seen  - CPAP 15 cm effective, supine REM was not achieved    He was started on CPAP 15 cmH20    He had another sleep study [place] 9/26/06 (225#) for 'hypersomnia'  - AHI 3.4, RDI 7.9 (on PAP)  - Next day MSLT mean 9.9 minutes, no SOREMPs noted    He initially presented to [facility] in 2022 for continuing care, tolerating CPAP well, with symptoms of recurrant excessive daytime sleepiness despite use of PAP for 7' 19\" on average (ESS 18), restless sleep, morning headaches, night sweats, breakthrough snoring. Recommended increase pressures to 15-18 cmH20    Patient Cesar Pandeytad was set up at [place]  on January 23, 2023. Patient received a Resmed Airsense 11 Pressures were set at 15-18 cm H2O.    Do you use a CPAP Machine at home: (Patient-Rptd) Yes  Overall, on a scale of 0-10 how would you rate your CPAP " (0 poor, 10 great): (Patient-Rptd) 8    What type of mask do you use: (Patient-Rptd) Nasal Mask  Is your mask comfortable: (Patient-Rptd) Yes    Is your mask leaking: (Patient-Rptd) No    Do you notice snoring with mask on: (Patient-Rptd) No  Do you notice gasping arousals with mask on: (Patient-Rptd) No  Are you having significant oral or nasal dryness: (Patient-Rptd) Yes  Is the pressure setting comfortable: (Patient-Rptd) Yes    What is your typical bedtime: (Patient-Rptd) 730 800pm  How long does it take you to go to sleep on PAP therapy: (Patient-Rptd) 5 minutes  What time do you typically get out of bed for the day: (Patient-Rptd) 200 or 300 am by choice   How many hours on average per night are you using PAP therapy: (Patient-Rptd) all  How many hours are you sleeping per night: (Patient-Rptd) 7  Do you feel well rested in the morning: (Patient-Rptd) Yes    He has 2 residences, he uses his old device at his place in [place]     [employer]   Auto-PAP 15.0 - 18.0 cmH2O 30 day usage data:    66% of days with > 4 hours of use. 9/30 days with no use.   Average use 5' 41 minutes per day.   95%ile Leak 61.49 L/min. Median 28  CPAP 95% pressure 15.3 cm. Max 15.5   AHI 0.94 events per hour.       EPWORTH SLEEPINESS SCALE         4/18/2025     6:31 PM    Randolph Sleepiness Scale ( MITZI Gamez  [id number]<br>ESS - [place]/English - Final version - 21 Nov 07 - [facility] [place].)   Sitting and reading Slight chance of dozing   Watching TV Moderate chance of dozing   Sitting, inactive in a public place (e.g. a theatre or a meeting) Slight chance of dozing   As a passenger in a car for an hour without a break Slight chance of dozing   Lying down to rest in the afternoon when circumstances permit Moderate chance of dozing   Sitting and talking to someone Slight chance of dozing   Sitting quietly after a lunch without alcohol Slight chance of dozing   In a car, while stopped for a few minutes in traffic Would never doze   Randolph  Score (MC) 9   [place] Score (Sleep) 9        Patient-reported       INSOMNIA SEVERITY INDEX (LISA)          4/18/2025     6:27 PM   Insomnia Severity Index (LISA)   Difficulty falling asleep 0   Difficulty staying asleep 0   Problems waking up too early 0   How SATISFIED/DISSATISFIED are you with your CURRENT sleep pattern? 1   How NOTICEABLE to others do you think your sleep problem is in terms of impairing the quality of your life? 1   How WORRIED/DISTRESSED are you about your current sleep problem? 1   To what extent do you consider your sleep problem to INTERFERE with your daily functioning (e.g. daytime fatigue, mood, ability to function at work/daily chores, concentration, memory, mood, etc.) CURRENTLY? 1   LISA Total Score 4        Patient-reported       Guidelines for Scoring/Interpretation:  Total score categories:  0-7 = No clinically significant insomnia   8-14 = Subthreshold insomnia   15-21 = Clinical insomnia (moderate severity)  22-28 = Clinical insomnia (severe)  Used via courtesy of www.LiveVoxealth.va.gov with permission from Hema Johnson PhD., [facility]        Past medical/surgical history, family history, social history, medications and allergies were reviewed.        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 CPAP 15 cm effective, supine REM was not achieved      Essential hypertension with goal blood pressure  "less than 140/90 07/30/2013     Priority: Medium    Mild persistent asthma 02/26/2013     Priority: Medium    Migraine, ophthalmoplegic 08/23/2010     Priority: Medium    Chondromalacia, knee, right 08/01/2017     Priority: Low    Chondromalacia, knee, left 06/13/2017     Priority: Low    Gastroesophageal reflux disease without esophagitis 03/08/2016     Priority: Low    Allergy to mold spores      Priority: Low     1/27/15 skin tests pos. for:  cat/dog/DM/M/T/G/W       House dust mite allergy      Priority: Low    Allergic rhinitis due to animal dander      Priority: Low    Seasonal allergic conjunctivitis      Priority: Low    Seasonal allergic rhinitis      Priority: Low    Shoulder impingement - left 08/09/2013     Priority: Low        /65   Ht 1.6 m (5' 3\")   Wt 82.6 kg (182 lb)   BMI 32.24 kg/m      "

## 2025-04-21 NOTE — PROGRESS NOTES
Virtual Visit Details    Type of service:  Video Visit     Originating Location (pt. Location): Other work    Distant Location (provider location):  Off-site  Platform used for Video Visit: Mally

## 2025-04-21 NOTE — NURSING NOTE
Current patient location:  [address] [place] [address]     Is the patient currently in the state of [place]? YES    Visit mode: VIDEO    If the visit is dropped, the patient can be reconnected by:VIDEO VISIT: Text to cell phone:   Telephone Information:   Mobile [phone]       Will anyone else be joining the visit? NO  (If patient encounters technical issues they should call [phone]    Are changes needed to the allergy or medication list? No    Are refills needed on medications prescribed by this physician? NO    Rooming Documentation:  Questionnaire(s) completed    Reason for visit: RECHECK    Ana JEFFREYF

## (undated) DEVICE — GLOVE PROTEXIS BLUE W/NEU-THERA 7.5  2D73EB75

## (undated) DEVICE — PREP CHLORAPREP 26ML TINTED ORANGE  260815

## (undated) DEVICE — Device

## (undated) DEVICE — BNDG ELASTIC 6"X5YDS UNSTERILE 6611-60

## (undated) DEVICE — DRSG STERI STRIP 1/2X4" R1547

## (undated) DEVICE — TUBING IRRIG TUR Y TYPE 96" LF 6543-01

## (undated) DEVICE — PACK ARTHROSCOPY KNEE SOP15AKFSM

## (undated) DEVICE — SOL WATER IRRIG 1000ML BOTTLE 07139-09

## (undated) DEVICE — ENDO FORCEP ENDOJAW BIOPSY 2.8MMX230CM FB-220U

## (undated) DEVICE — SOL NACL 0.9% IRRIG 3000ML BAG 07972-08

## (undated) DEVICE — GLOVE PROTEXIS BLUE W/NEU-THERA 7.0  2D73EB70

## (undated) DEVICE — GLOVE PROTEXIS POWDER FREE 7.5 ORTHOPEDIC 2D73ET75

## (undated) DEVICE — BLADE KNIFE SURG 11 371111

## (undated) DEVICE — GLOVE PROTEXIS POWDER FREE 7.0 ORTHOPEDIC 2D73ET70

## (undated) RX ORDER — FENTANYL CITRATE 50 UG/ML
INJECTION, SOLUTION INTRAMUSCULAR; INTRAVENOUS
Status: DISPENSED
Start: 2021-03-17

## (undated) RX ORDER — GLYCOPYRROLATE 0.2 MG/ML
INJECTION, SOLUTION INTRAMUSCULAR; INTRAVENOUS
Status: DISPENSED
Start: 2023-07-17

## (undated) RX ORDER — PROPOFOL 10 MG/ML
INJECTION, EMULSION INTRAVENOUS
Status: DISPENSED
Start: 2023-07-17

## (undated) RX ORDER — SIMETHICONE 40MG/0.6ML
SUSPENSION, DROPS(FINAL DOSAGE FORM)(ML) ORAL
Status: DISPENSED
Start: 2021-03-17

## (undated) RX ORDER — ONDANSETRON 2 MG/ML
INJECTION INTRAMUSCULAR; INTRAVENOUS
Status: DISPENSED
Start: 2021-03-17